# Patient Record
Sex: FEMALE | Race: WHITE | NOT HISPANIC OR LATINO | Employment: OTHER | URBAN - METROPOLITAN AREA
[De-identification: names, ages, dates, MRNs, and addresses within clinical notes are randomized per-mention and may not be internally consistent; named-entity substitution may affect disease eponyms.]

---

## 2023-02-28 PROBLEM — M25.511 RIGHT SHOULDER PAIN: Status: ACTIVE | Noted: 2023-02-28
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
## 2023-02-28 PROBLEM — M06.9 RHEUMATOID ARTHRITIS (MULTI): Status: ACTIVE | Noted: 2023-02-28

## 2023-02-28 PROBLEM — L72.3 INFECTED SEBACEOUS CYST: Status: ACTIVE | Noted: 2023-02-28

## 2023-02-28 PROBLEM — M47.12 CERVICAL SPONDYLOSIS WITH MYELOPATHY: Status: ACTIVE | Noted: 2023-02-28

## 2023-02-28 PROBLEM — F32.A ANXIETY AND DEPRESSION: Status: ACTIVE | Noted: 2023-02-28

## 2023-02-28 PROBLEM — M25.473 ANKLE SWELLING: Status: ACTIVE | Noted: 2023-02-28

## 2023-02-28 PROBLEM — M79.601 PAIN OF RIGHT UPPER EXTREMITY: Status: ACTIVE | Noted: 2023-02-28

## 2023-02-28 PROBLEM — D72.829 ELEVATED WHITE BLOOD CELL COUNT: Status: ACTIVE | Noted: 2023-02-28

## 2023-02-28 PROBLEM — E11.9 TYPE 2 DIABETES MELLITUS (MULTI): Status: ACTIVE | Noted: 2023-02-28

## 2023-02-28 PROBLEM — M25.549 PAIN IN JOINT, HAND: Status: ACTIVE | Noted: 2023-02-28

## 2023-02-28 PROBLEM — T14.8XXA SPLINTER IN SKIN: Status: ACTIVE | Noted: 2023-02-28

## 2023-02-28 PROBLEM — M54.12 CERVICAL RADICULOPATHY: Status: ACTIVE | Noted: 2023-02-28

## 2023-02-28 PROBLEM — L30.1 DYSHIDROTIC ECZEMA: Status: ACTIVE | Noted: 2023-02-28

## 2023-02-28 PROBLEM — I10 BENIGN ESSENTIAL HYPERTENSION: Status: ACTIVE | Noted: 2023-02-28

## 2023-02-28 PROBLEM — G24.3 CERVICAL DYSTONIA: Status: ACTIVE | Noted: 2023-02-28

## 2023-02-28 PROBLEM — M79.671 RIGHT FOOT PAIN: Status: ACTIVE | Noted: 2023-02-28

## 2023-02-28 PROBLEM — H66.93 BILATERAL OTITIS MEDIA: Status: ACTIVE | Noted: 2023-02-28

## 2023-02-28 PROBLEM — R10.811 RUQ ABDOMINAL TENDERNESS: Status: ACTIVE | Noted: 2023-02-28

## 2023-02-28 PROBLEM — F41.9 ANXIETY AND DEPRESSION: Status: ACTIVE | Noted: 2023-02-28

## 2023-02-28 PROBLEM — M25.539 PAIN, WRIST JOINT: Status: ACTIVE | Noted: 2023-02-28

## 2023-02-28 PROBLEM — D69.2 PURPURA (CMS-HCC): Status: ACTIVE | Noted: 2023-02-28

## 2023-02-28 PROBLEM — L60.9 NAIL LESION: Status: ACTIVE | Noted: 2023-02-28

## 2023-02-28 PROBLEM — M25.561 RIGHT KNEE PAIN: Status: ACTIVE | Noted: 2023-02-28

## 2023-02-28 PROBLEM — H66.92 OTITIS MEDIA, LEFT: Status: ACTIVE | Noted: 2023-02-28

## 2023-02-28 PROBLEM — F32.A DEPRESSION: Status: ACTIVE | Noted: 2023-02-28

## 2023-02-28 PROBLEM — M48.02 SPINAL STENOSIS OF CERVICAL REGION: Status: ACTIVE | Noted: 2023-02-28

## 2023-02-28 PROBLEM — H60.90 OTITIS EXTERNA: Status: ACTIVE | Noted: 2023-02-28

## 2023-02-28 PROBLEM — J45.909 ASTHMA (HHS-HCC): Status: ACTIVE | Noted: 2023-02-28

## 2023-02-28 PROBLEM — J32.9 SINUSITIS: Status: ACTIVE | Noted: 2023-02-28

## 2023-02-28 PROBLEM — M54.2 NECK PAIN: Status: ACTIVE | Noted: 2023-02-28

## 2023-02-28 PROBLEM — R13.10 DYSPHAGIA: Status: ACTIVE | Noted: 2023-02-28

## 2023-02-28 PROBLEM — K52.9 CHRONIC DIARRHEA: Status: ACTIVE | Noted: 2023-02-28

## 2023-02-28 PROBLEM — K04.7 DENTAL INFECTION: Status: ACTIVE | Noted: 2023-02-28

## 2023-02-28 PROBLEM — F43.0 STRESS REACTION: Status: ACTIVE | Noted: 2023-02-28

## 2023-02-28 PROBLEM — E78.5 DYSLIPIDEMIA: Status: ACTIVE | Noted: 2023-02-28

## 2023-02-28 PROBLEM — H61.23 BILATERAL IMPACTED CERUMEN: Status: ACTIVE | Noted: 2023-02-28

## 2023-02-28 PROBLEM — G25.0 BENIGN HEAD TREMOR: Status: ACTIVE | Noted: 2023-02-28

## 2023-02-28 PROBLEM — M54.50 LOW BACK PAIN: Status: ACTIVE | Noted: 2023-02-28

## 2023-02-28 PROBLEM — G47.33 OBSTRUCTIVE SLEEP APNEA SYNDROME: Status: ACTIVE | Noted: 2023-02-28

## 2023-02-28 PROBLEM — R25.1 TREMOR: Status: ACTIVE | Noted: 2023-02-28

## 2023-02-28 PROBLEM — T07.XXXA MULTIPLE CONTUSIONS: Status: ACTIVE | Noted: 2023-02-28

## 2023-02-28 PROBLEM — L08.9 INFECTED SEBACEOUS CYST: Status: ACTIVE | Noted: 2023-02-28

## 2023-02-28 RX ORDER — ALENDRONATE SODIUM 70 MG/1
TABLET ORAL
COMMUNITY
Start: 2022-06-22 | End: 2023-05-04 | Stop reason: ALTCHOICE

## 2023-02-28 RX ORDER — INSULIN DEGLUDEC 100 U/ML
INJECTION, SOLUTION SUBCUTANEOUS
COMMUNITY
Start: 2019-04-29

## 2023-02-28 RX ORDER — METHOTREXATE 2.5 MG/1
TABLET ORAL
COMMUNITY
Start: 2020-05-22

## 2023-02-28 RX ORDER — PRIMIDONE 50 MG/1
2 TABLET ORAL 2 TIMES DAILY
COMMUNITY
Start: 2016-11-14

## 2023-02-28 RX ORDER — ESCITALOPRAM OXALATE 20 MG/1
1 TABLET ORAL DAILY
COMMUNITY
Start: 2017-01-04 | End: 2023-11-03 | Stop reason: ALTCHOICE

## 2023-02-28 RX ORDER — ALBUTEROL SULFATE 90 UG/1
AEROSOL, METERED RESPIRATORY (INHALATION)
COMMUNITY

## 2023-02-28 RX ORDER — TRAZODONE HYDROCHLORIDE 50 MG/1
TABLET ORAL
COMMUNITY
Start: 2012-01-06 | End: 2023-10-17 | Stop reason: ALTCHOICE

## 2023-02-28 RX ORDER — BLOOD SUGAR DIAGNOSTIC
STRIP MISCELLANEOUS
COMMUNITY
Start: 2019-05-03

## 2023-02-28 RX ORDER — LEVOTHYROXINE SODIUM 88 UG/1
1 TABLET ORAL DAILY
COMMUNITY
Start: 2019-03-13 | End: 2023-11-03 | Stop reason: ALTCHOICE

## 2023-02-28 RX ORDER — PREDNISONE 2.5 MG/1
TABLET ORAL
COMMUNITY
Start: 2020-07-30 | End: 2023-11-03 | Stop reason: ALTCHOICE

## 2023-02-28 RX ORDER — SIMVASTATIN 40 MG/1
1 TABLET, FILM COATED ORAL DAILY
COMMUNITY
Start: 2012-01-24 | End: 2023-08-07 | Stop reason: SDUPTHER

## 2023-02-28 RX ORDER — BUSPIRONE HYDROCHLORIDE 10 MG/1
TABLET ORAL
COMMUNITY
Start: 2020-11-13 | End: 2023-12-07 | Stop reason: SDUPTHER

## 2023-02-28 RX ORDER — SALICYLIC ACID 275 MG/ML
LIQUID TOPICAL
COMMUNITY
Start: 2020-09-24 | End: 2023-11-03 | Stop reason: ALTCHOICE

## 2023-02-28 RX ORDER — FOLIC ACID 1 MG/1
1 TABLET ORAL DAILY
COMMUNITY
Start: 2020-05-21

## 2023-02-28 RX ORDER — CLONAZEPAM 0.5 MG/1
1 TABLET ORAL 2 TIMES DAILY
COMMUNITY
Start: 2012-01-06 | End: 2023-11-03 | Stop reason: ALTCHOICE

## 2023-02-28 RX ORDER — TRIHEXYPHENIDYL HYDROCHLORIDE 2 MG/1
1 TABLET ORAL 3 TIMES DAILY
COMMUNITY
Start: 2019-11-06 | End: 2023-12-26

## 2023-02-28 RX ORDER — SODIUM, POTASSIUM,MAG SULFATES 17.5-3.13G
SOLUTION, RECONSTITUTED, ORAL ORAL
COMMUNITY
Start: 2021-11-16 | End: 2023-10-17 | Stop reason: ALTCHOICE

## 2023-03-14 PROBLEM — H61.23 BILATERAL IMPACTED CERUMEN: Status: RESOLVED | Noted: 2023-02-28 | Resolved: 2023-03-14

## 2023-03-14 PROBLEM — T07.XXXA MULTIPLE CONTUSIONS: Status: RESOLVED | Noted: 2023-02-28 | Resolved: 2023-03-14

## 2023-03-14 PROBLEM — M79.601 PAIN OF RIGHT UPPER EXTREMITY: Status: RESOLVED | Noted: 2023-02-28 | Resolved: 2023-03-14

## 2023-03-14 PROBLEM — H66.93 BILATERAL OTITIS MEDIA: Status: RESOLVED | Noted: 2023-02-28 | Resolved: 2023-03-14

## 2023-03-14 PROBLEM — M25.539 PAIN, WRIST JOINT: Status: RESOLVED | Noted: 2023-02-28 | Resolved: 2023-03-14

## 2023-03-14 PROBLEM — L08.9 INFECTED SEBACEOUS CYST: Status: RESOLVED | Noted: 2023-02-28 | Resolved: 2023-03-14

## 2023-03-14 PROBLEM — M54.50 LOW BACK PAIN: Status: RESOLVED | Noted: 2023-02-28 | Resolved: 2023-03-14

## 2023-03-14 PROBLEM — M54.12 CERVICAL RADICULOPATHY: Status: RESOLVED | Noted: 2023-02-28 | Resolved: 2023-03-14

## 2023-03-14 PROBLEM — M25.473 ANKLE SWELLING: Status: RESOLVED | Noted: 2023-02-28 | Resolved: 2023-03-14

## 2023-03-14 PROBLEM — T14.8XXA SPLINTER IN SKIN: Status: RESOLVED | Noted: 2023-02-28 | Resolved: 2023-03-14

## 2023-03-14 PROBLEM — R10.811 RUQ ABDOMINAL TENDERNESS: Status: RESOLVED | Noted: 2023-02-28 | Resolved: 2023-03-14

## 2023-03-14 PROBLEM — M54.2 NECK PAIN: Status: RESOLVED | Noted: 2023-02-28 | Resolved: 2023-03-14

## 2023-03-14 PROBLEM — M79.671 RIGHT FOOT PAIN: Status: RESOLVED | Noted: 2023-02-28 | Resolved: 2023-03-14

## 2023-03-14 PROBLEM — M25.511 RIGHT SHOULDER PAIN: Status: RESOLVED | Noted: 2023-02-28 | Resolved: 2023-03-14

## 2023-03-14 PROBLEM — H66.92 OTITIS MEDIA, LEFT: Status: RESOLVED | Noted: 2023-02-28 | Resolved: 2023-03-14

## 2023-03-14 PROBLEM — D69.2 PURPURA (CMS-HCC): Status: RESOLVED | Noted: 2023-02-28 | Resolved: 2023-03-14

## 2023-03-14 PROBLEM — J32.9 SINUSITIS: Status: RESOLVED | Noted: 2023-02-28 | Resolved: 2023-03-14

## 2023-03-14 PROBLEM — M25.561 RIGHT KNEE PAIN: Status: RESOLVED | Noted: 2023-02-28 | Resolved: 2023-03-14

## 2023-03-14 PROBLEM — L72.3 INFECTED SEBACEOUS CYST: Status: RESOLVED | Noted: 2023-02-28 | Resolved: 2023-03-14

## 2023-03-14 PROBLEM — K04.7 DENTAL INFECTION: Status: RESOLVED | Noted: 2023-02-28 | Resolved: 2023-03-14

## 2023-03-14 PROBLEM — H60.90 OTITIS EXTERNA: Status: RESOLVED | Noted: 2023-02-28 | Resolved: 2023-03-14

## 2023-03-14 PROBLEM — M25.549 PAIN IN JOINT, HAND: Status: RESOLVED | Noted: 2023-02-28 | Resolved: 2023-03-14

## 2023-03-15 ENCOUNTER — APPOINTMENT (OUTPATIENT)
Dept: PRIMARY CARE | Facility: CLINIC | Age: 68
End: 2023-03-15
Payer: MEDICARE

## 2023-03-22 ASSESSMENT — ENCOUNTER SYMPTOMS
HEADACHES: 0
RHINORRHEA: 0
CHEST TIGHTNESS: 0
EYE DISCHARGE: 0
SHORTNESS OF BREATH: 0
MYALGIAS: 0
FATIGUE: 0
VOMITING: 0
CHILLS: 0
DIARRHEA: 0
ARTHRALGIAS: 0
PALPITATIONS: 0
SINUS PRESSURE: 0
SORE THROAT: 0
FEVER: 0
EYE REDNESS: 0
ABDOMINAL PAIN: 0
WHEEZING: 0
COUGH: 0
FACIAL SWELLING: 0

## 2023-03-22 NOTE — PROGRESS NOTES
Subjective   Patient ID: Sweta Lim is a 67 y.o. female who presents for No chief complaint on file..    current sx-  when did sx start-  did pt take a covid-19 test?  if yes when?  Last albuterol inh use-    lala-rheum, giovanni-ob gyn, podiatristindu  Name of rheum  Name of podiatrist  Sees  psych, neurosurg, neuro-mvmt, gi  Sees ent-jose  Last eye dr appt  If in last 12mon, lala  Name of eye dr  Last dentist    last physical >1yr  last labs 6/2022 tsh and t4 nl; 3/2022 vit d nl  Last a1c 2/28/23 A1C 6.4  Last microalb 2/28/23 microalb high at 60; did dr griggs order a 24hr urine test to further evaluate this?  is pt fasting?  Due for cmp, lipid, cbc  Sugars fasting  Sugars 2hrs pp  Bps at home  Using cpap nightly?  Tdap 2020  flu shot- unavailable  shingrix  [id number] 2020  last colonoscopy  last pap  last mammo 2/23/22 dr davila; has she had a mammo in the last mon?  bone density  advanced care plan  Family history form        HPI  Due for awv      No care team member to display     Review of Systems   Constitutional:  Negative for chills, fatigue and fever.   HENT:  Negative for congestion, ear discharge, ear pain, facial swelling, postnasal drip, rhinorrhea, sinus pressure and sore throat.    Eyes:  Negative for discharge and redness.   Respiratory:  Negative for cough, chest tightness, shortness of breath and wheezing.    Cardiovascular:  Negative for chest pain, palpitations and leg swelling.   Gastrointestinal:  Negative for abdominal pain, diarrhea and vomiting.   Musculoskeletal:  Negative for arthralgias and myalgias.   Skin:  Negative for rash.   Neurological:  Negative for headaches.       Objective   There were no vitals taken for this visit.   BP Readings from Last 3 Encounters:   08/24/22 118/62   06/02/22 112/64   05/03/22 132/74     Wt Readings from Last 3 Encounters:   08/24/22 89.4 kg (197 lb)   06/02/22 88.1 kg (194 lb 3 oz)   05/03/22 90.8 kg (200 lb 3.2 oz)       Physical  Exam  Constitutional:       Appearance: Normal appearance.   HENT:      Head: Normocephalic.      Right Ear: Tympanic membrane, ear canal and external ear normal.      Left Ear: Tympanic membrane, ear canal and external ear normal.      Nose: Nose normal.      Mouth/Throat:      Mouth: Mucous membranes are moist.      Pharynx: No oropharyngeal exudate or posterior oropharyngeal erythema.   Cardiovascular:      Rate and Rhythm: Normal rate and regular rhythm.      Heart sounds: Normal heart sounds.   Pulmonary:      Effort: Pulmonary effort is normal.      Breath sounds: Normal breath sounds. No wheezing, rhonchi or rales.   Lymphadenopathy:      Cervical: No cervical adenopathy.   Neurological:      Mental Status: She is alert.         Assessment/Plan   {Assess/PlanSmartLinks:87797}

## 2023-03-23 ENCOUNTER — APPOINTMENT (OUTPATIENT)
Dept: PRIMARY CARE | Facility: CLINIC | Age: 68
End: 2023-03-23
Payer: MEDICARE

## 2023-03-29 ASSESSMENT — ENCOUNTER SYMPTOMS
SORE THROAT: 0
FEVER: 0
EYE REDNESS: 0
HEADACHES: 0
CHEST TIGHTNESS: 0
FATIGUE: 0
MYALGIAS: 0
ARTHRALGIAS: 0
SHORTNESS OF BREATH: 0
DIARRHEA: 0
COUGH: 0
WHEEZING: 0
CHILLS: 0
FACIAL SWELLING: 0
PALPITATIONS: 0
ABDOMINAL PAIN: 0
EYE DISCHARGE: 0
VOMITING: 0
SINUS PRESSURE: 0

## 2023-03-29 NOTE — PROGRESS NOTES
Subjective   Patient ID: Sweta Lim is a 67 y.o. female who presents for Earache (Have pain in left ear, she's falling, wasn't eating when she lost her mom in a lot of pain. Swelling of her neck. Pain for about 6wks./No recent COVID test/Last albuterol use about 6:30pm last night./Dr. Toby Haynes/Dr. Malone/Last eye dr dickey last yr./Last dentist last yr./Fasting-No/Bp not being check at home/Cpap not being used/Last tdap 2 yrs ago/Decline flu shot, shingrix, [id number]/Last colon unsure/Last pap 3yrs ago/Last mammo 3yrs/Bone density-none/Advance care plan-no//).    lala-rheum, giovanni-ob gyn, podiatristindu  Name of rheum-toby  Name of podiatrist-benito  Sees  psych, neurosurg, neuro-mvmt, gi  Sees ent-jose  Last eye dr dickey-last yr  Name of eye dr [facility]  Last dentist last yr    last physical >1yr  last labs 2022 tsh and t4 nl; 3/2022 vit d nl  Last a1c 23 A1C 6.4  Last microalb 23 microalb high at 60; did dr griggs order a 24hr urine test to further evaluate this? no  is pt fasting? no  Due for cmp, lipid, cbc  Sugars fasting-low sugars  Sugars 2hrs pp-none  Bps at home no  Using cpap nightly? no  Tdap 2yrs ago-[facility]  flu shot-unavailable  Shingrix-declines  [id number]   last colonoscopy-unsure  last pap 3yrs ago  last mammo 22 dr davila; has she had a mammo in the last mon?no  bone density none  advanced care plan no  Family history form    Not ready for neck surgery  Disc fusion in past but getting worse again    HPI  current sx-lf ear pain x 6wks  Runny nose and stuffy nose noted  No post nasal drip, ST, ear drainage, fever, chills  Selftxt mucinex    Decr appetite when mom  24; has decr weight; legs weak so hard to walk and falling  Diet iced tea, 3 meal repl drinks  Aunt in nursing home for dementia  did pt take a covid-19 test? no  Last albuterol inh use-last night  Has ent     No care team member to display     Review of Systems   Constitutional:   Negative for chills, fatigue and fever.   HENT:  Positive for congestion, ear pain and rhinorrhea. Negative for ear discharge, facial swelling, postnasal drip, sinus pressure and sore throat.    Eyes:  Negative for discharge and redness.   Respiratory:  Negative for cough, chest tightness, shortness of breath and wheezing.    Cardiovascular:  Negative for chest pain, palpitations and leg swelling.   Gastrointestinal:  Negative for abdominal pain, diarrhea and vomiting.   Genitourinary:         Decr appetite   Musculoskeletal:  Negative for arthralgias and myalgias.   Skin:  Negative for rash.   Neurological:  Positive for weakness. Negative for headaches.       Objective   Visit Vitals  /59   Pulse 69   Temp 36.7 °C (98 °F) (Temporal)      BP Readings from Last 3 Encounters:   03/30/23 115/59   08/24/22 118/62   06/02/22 112/64     Wt Readings from Last 3 Encounters:   03/30/23 78 kg (172 lb)   08/24/22 89.4 kg (197 lb)   06/02/22 88.1 kg (194 lb 3 oz)       Physical Exam  Constitutional:       Appearance: Normal appearance.   HENT:      Head: Normocephalic.      Right Ear: Tympanic membrane, ear canal and external ear normal.      Left Ear: Tympanic membrane, ear canal and external ear normal.      Nose: Nose normal.      Mouth/Throat:      Mouth: Mucous membranes are moist.      Pharynx: No oropharyngeal exudate or posterior oropharyngeal erythema.   Cardiovascular:      Rate and Rhythm: Normal rate.      Heart sounds: Murmur heard.      Comments: Pause heard between 2 beats  Pulmonary:      Effort: Pulmonary effort is normal.      Breath sounds: Normal breath sounds. No wheezing, rhonchi or rales.   Lymphadenopathy:      Cervical: No cervical adenopathy.   Neurological:      Mental Status: She is alert.         Assessment/Plan   Diagnoses and all orders for this visit:  Heart murmur  -     Referral to Cardiology; Future  Encounter for screening mammogram for malignant neoplasm of breast  -     BI mammo  bilateral screening tomosynthesis; Future  Postmenopausal estrogen deficiency  -     XR DEXA bone density; Future  Cerumen debris on tympanic membrane, left  -     Ear cerumen removal; Future  Dizziness  -     CBC and Auto Differential; Future  -     Comprehensive Metabolic Panel; Future  Weight loss  -     Referral to Nutrition Services; Future  Microalbuminuria  -     Albumin, 24 Hour Urine; Future  Lipid screening  -     Lipid Panel; Future  Left ear pain  -     amoxicillin-pot clavulanate (Augmentin) 875-125 mg tablet; Take 1 tablet (875 mg) by mouth in the morning and 1 tablet (875 mg) before bedtime. Do all this for 10 days.  Other orders  -     Follow Up In Primary Care; Future

## 2023-03-30 ENCOUNTER — OFFICE VISIT (OUTPATIENT)
Dept: PRIMARY CARE | Facility: CLINIC | Age: 68
End: 2023-03-30
Payer: MEDICARE

## 2023-03-30 VITALS
TEMPERATURE: 98 F | WEIGHT: 172 LBS | BODY MASS INDEX: 26.94 KG/M2 | DIASTOLIC BLOOD PRESSURE: 59 MMHG | SYSTOLIC BLOOD PRESSURE: 115 MMHG | HEART RATE: 69 BPM | OXYGEN SATURATION: 87 %

## 2023-03-30 DIAGNOSIS — R01.1 HEART MURMUR: Primary | ICD-10-CM

## 2023-03-30 DIAGNOSIS — R80.9 MICROALBUMINURIA: ICD-10-CM

## 2023-03-30 DIAGNOSIS — F11.20 NARCOTIC DEPENDENCE (MULTI): ICD-10-CM

## 2023-03-30 DIAGNOSIS — Z13.220 LIPID SCREENING: ICD-10-CM

## 2023-03-30 DIAGNOSIS — M06.9 RHEUMATOID ARTHRITIS, INVOLVING UNSPECIFIED SITE, UNSPECIFIED WHETHER RHEUMATOID FACTOR PRESENT (MULTI): ICD-10-CM

## 2023-03-30 DIAGNOSIS — H61.22 CERUMEN DEBRIS ON TYMPANIC MEMBRANE, LEFT: ICD-10-CM

## 2023-03-30 DIAGNOSIS — E11.42 DIABETIC POLYNEUROPATHY ASSOCIATED WITH TYPE 2 DIABETES MELLITUS (MULTI): ICD-10-CM

## 2023-03-30 DIAGNOSIS — H92.02 LEFT EAR PAIN: ICD-10-CM

## 2023-03-30 DIAGNOSIS — R42 DIZZINESS: ICD-10-CM

## 2023-03-30 DIAGNOSIS — Z78.0 POSTMENOPAUSAL ESTROGEN DEFICIENCY: ICD-10-CM

## 2023-03-30 DIAGNOSIS — Z12.31 ENCOUNTER FOR SCREENING MAMMOGRAM FOR MALIGNANT NEOPLASM OF BREAST: ICD-10-CM

## 2023-03-30 DIAGNOSIS — R63.4 WEIGHT LOSS: ICD-10-CM

## 2023-03-30 PROCEDURE — 99214 OFFICE O/P EST MOD 30 MIN: CPT | Performed by: NURSE PRACTITIONER

## 2023-03-30 PROCEDURE — 1160F RVW MEDS BY RX/DR IN RCRD: CPT | Performed by: NURSE PRACTITIONER

## 2023-03-30 PROCEDURE — 1159F MED LIST DOCD IN RCRD: CPT | Performed by: NURSE PRACTITIONER

## 2023-03-30 PROCEDURE — 3078F DIAST BP <80 MM HG: CPT | Performed by: NURSE PRACTITIONER

## 2023-03-30 PROCEDURE — 3074F SYST BP LT 130 MM HG: CPT | Performed by: NURSE PRACTITIONER

## 2023-03-30 RX ORDER — AMOXICILLIN AND CLAVULANATE POTASSIUM 875; 125 MG/1; MG/1
1 TABLET, FILM COATED ORAL 2 TIMES DAILY
Qty: 20 TABLET | Refills: 0 | Status: SHIPPED | OUTPATIENT
Start: 2023-03-30 | End: 2023-04-09

## 2023-03-30 ASSESSMENT — ENCOUNTER SYMPTOMS
WEAKNESS: 1
RHINORRHEA: 1

## 2023-03-30 NOTE — PATIENT INSTRUCTIONS
See counselor once a wk or twice a month    For dizziness-labs, consider ct head    24hr urine to check for protein    You can use the lab in our building when fasting. The hrs are: Monday-Thursday, 7 a.m. - 6 p.m., Friday, 7 a.m. - 5 p.m., Saturday 8 a.m. - 12 noon.   No appt needed, BUT YOU DO NEED THE PAPER ORDER.    Fasting is no food, drink, gum or mints other than water for 8-12 hrs.   Results will be back in 2-3 business days for most labs. It is always recommended for any orders (labs, xrays, ultrasounds,MRI, ct scan, procedures etc) to check with your insurance provider for expected costs or expenses to you.       Ear irrigation left  Zyrtec 1 a day x 2wks  Antibiotic  See dr garcia ([phone]) if not getting better in 1wk    Set up bone density and mammogram at [phone]    Set up cardio  appt and nutrition appt at [phone]    Hold on pulm  referral    Need pneumonia shot date at [facility]    Stop smoking    Discuss cpap at next appt    Return for in 1mon for follow up      I will communicate with you via [employer] regarding messages and results. If you need help with this, you can call the support line at [phone].    IT WAS A PLEASURE TO SEE YOU TODAY. THANK YOU FOR CHOOSING US FOR YOUR HEALTHCARE NEEDS.

## 2023-04-06 ENCOUNTER — TELEPHONE (OUTPATIENT)
Dept: PRIMARY CARE | Facility: CLINIC | Age: 68
End: 2023-04-06

## 2023-05-01 ENCOUNTER — APPOINTMENT (OUTPATIENT)
Dept: PRIMARY CARE | Facility: CLINIC | Age: 68
End: 2023-05-01
Payer: MEDICARE

## 2023-05-01 ASSESSMENT — ENCOUNTER SYMPTOMS
CONSTITUTIONAL NEGATIVE: 1
SHORTNESS OF BREATH: 0
WHEEZING: 0

## 2023-05-01 NOTE — PROGRESS NOTES
Subjective   Patient ID: Sweta Lim is a 67 y.o. female who presents for No chief complaint on file..    last physical >1yr  last labs 6/2022 tsh and t4 nl; 3/2022 vit d nl  Last a1c 2/28/23 A1C 6.4  Last microalb 2/28/23 microalb high at 60; did dr griggs order a 24hr urine test to further evaluate this? No    Saw cardio NP 4/6/23 re: heart murmur    Due for lipid, cmp, cbc  Is pt fasting?    Did pt set up bone density    Did pt set up mammo    Did pt set up nutrition appt    Did pt do 24hr urine test      HPI            No care team member to display     Review of Systems   Constitutional: Negative.    Respiratory:  Negative for shortness of breath and wheezing.    Cardiovascular:  Negative for chest pain.       Objective   There were no vitals taken for this visit.     BP Readings from Last 3 Encounters:   03/30/23 115/59   08/24/22 118/62   06/02/22 112/64     Wt Readings from Last 3 Encounters:   03/30/23 78 kg (172 lb)   08/24/22 89.4 kg (197 lb)   06/02/22 88.1 kg (194 lb 3 oz)       Physical Exam  Constitutional:       General: She is not in acute distress.     Appearance: Normal appearance.   Neurological:      Mental Status: She is alert.       Assessment/Plan   There are no diagnoses linked to this encounter.

## 2023-05-03 ASSESSMENT — ENCOUNTER SYMPTOMS
SHORTNESS OF BREATH: 0
WHEEZING: 0
CONSTITUTIONAL NEGATIVE: 1

## 2023-05-03 NOTE — PROGRESS NOTES
Subjective   Patient ID: Sweta Lim is a 67 y.o. female who presents for Follow-up (Follow-up Pt states she want talk about her teeth when they numbed her she thinks something maybe going on with the arteries in neck. She's feeling tired all the time, She wasn't told about the instructions for the 24 urine test so she tossed it out./Mammo-No/Nutrition-No she think the cardio dr set it up. /She states her ear still hurt left from the irrigation /).    last physical >1yr  last labs 6/2022 tsh and t4 nl; 3/2022 vit d nl  Last a1c 2/28/23 A1C 6.4  Last microalb 2/28/23 microalb high at 60; did dr griggs order a 24hr urine test to further evaluate this? No    Saw cardio NP 4/6/23 re: heart murmur    Due for cbc  Kasasndra vicente ordered cmp lipid and a1c  Is pt fasting?    Did pt set up bone density    Did pt set up mammo- no    Did pt set up nutrition appt-no    Did pt do 24hr urine test yes but not refrig      HPI  Teeth-injected teeth and sob/asthma attack happened; pt has asthma  Donte ceballos, [phone]    Needs new 24hr urine order    Pain in lf ear still    Needs mammo and bone density order-might have appts next wk      I have personally reviewed the OARRS report for this patient. I have considered the risks of abuse, dependence, addiction and diversion. No concerns.      No care team member to display     Review of Systems   Constitutional: Negative.    Respiratory:  Negative for shortness of breath and wheezing.    Cardiovascular:  Negative for chest pain.       Objective   Visit Vitals  /68   Pulse 64   Temp 36.5 °C (97.7 °F) (Oral)      BP Readings from Last 3 Encounters:   05/04/23 125/68   03/30/23 115/59   08/24/22 118/62     Wt Readings from Last 3 Encounters:   05/04/23 78 kg (172 lb)   03/30/23 78 kg (172 lb)   08/24/22 89.4 kg (197 lb)       Physical Exam  Constitutional:       General: She is not in acute distress.     Appearance: Normal appearance.   Neurological:      Mental Status: She  is alert.         Assessment/Plan   1. Anxiety and depression    - ALPRAZolam (Xanax) 0.5 mg tablet; 1 tab before dental procedure  Dispense: 5 tablet; Refill: 0  - Referral to Psychology; Future    2. Left ear pain    - Referral to ENT; Future

## 2023-05-04 ENCOUNTER — OFFICE VISIT (OUTPATIENT)
Dept: PRIMARY CARE | Facility: CLINIC | Age: 68
End: 2023-05-04
Payer: MEDICARE

## 2023-05-04 VITALS
HEART RATE: 64 BPM | SYSTOLIC BLOOD PRESSURE: 125 MMHG | WEIGHT: 172 LBS | DIASTOLIC BLOOD PRESSURE: 68 MMHG | OXYGEN SATURATION: 92 % | HEIGHT: 67 IN | TEMPERATURE: 97.7 F | BODY MASS INDEX: 27 KG/M2

## 2023-05-04 DIAGNOSIS — F41.9 ANXIETY AND DEPRESSION: Primary | ICD-10-CM

## 2023-05-04 DIAGNOSIS — H92.02 LEFT EAR PAIN: ICD-10-CM

## 2023-05-04 DIAGNOSIS — F32.A ANXIETY AND DEPRESSION: Primary | ICD-10-CM

## 2023-05-04 LAB
NON-UH HIE BASO COUNT: 0.04 X1000 (ref 0–0.2)
NON-UH HIE BASOS %: 0.8 %
NON-UH HIE DIFF?: NO
NON-UH HIE EOS COUNT: 0.01 X1000 (ref 0–0.5)
NON-UH HIE EOSIN %: 0.2 %
NON-UH HIE HCT: 46.4 % (ref 36–46)
NON-UH HIE HGB: 15.3 G/DL (ref 12–16)
NON-UH HIE INSTR WBC: 5.8
NON-UH HIE LYMPH %: 19.1 %
NON-UH HIE LYMPH COUNT: 1.11 X1000 (ref 1.2–4.8)
NON-UH HIE MCH: 33.9 PG (ref 27–34)
NON-UH HIE MCHC: 33 G/DL (ref 32–37)
NON-UH HIE MCV: 102.7 FL (ref 80–100)
NON-UH HIE MONO %: 7.1 %
NON-UH HIE MONO COUNT: 0.42 X1000 (ref 0.1–1)
NON-UH HIE MPV: 8.2 FL (ref 7.4–10.4)
NON-UH HIE NEUTROPHIL %: 72.9 %
NON-UH HIE NEUTROPHIL COUNT (ANC): 4.25 X1000 (ref 1.4–8.8)
NON-UH HIE NUCLEATED RBC: 0 /100WBC
NON-UH HIE PLATELET: 299 X10 (ref 150–450)
NON-UH HIE RBC: 4.52 X10 (ref 4.2–5.4)
NON-UH HIE RDW: 15 % (ref 11.5–14.5)
NON-UH HIE WBC: 5.8 X10 (ref 4.5–11)

## 2023-05-04 PROCEDURE — 3074F SYST BP LT 130 MM HG: CPT | Performed by: NURSE PRACTITIONER

## 2023-05-04 PROCEDURE — 3078F DIAST BP <80 MM HG: CPT | Performed by: NURSE PRACTITIONER

## 2023-05-04 PROCEDURE — 1160F RVW MEDS BY RX/DR IN RCRD: CPT | Performed by: NURSE PRACTITIONER

## 2023-05-04 PROCEDURE — 99214 OFFICE O/P EST MOD 30 MIN: CPT | Performed by: NURSE PRACTITIONER

## 2023-05-04 PROCEDURE — 1159F MED LIST DOCD IN RCRD: CPT | Performed by: NURSE PRACTITIONER

## 2023-05-04 RX ORDER — ALPRAZOLAM 0.5 MG/1
TABLET ORAL
Qty: 5 TABLET | Refills: 0 | Status: SHIPPED | OUTPATIENT
Start: 2023-05-04 | End: 2023-10-17 | Stop reason: ALTCHOICE

## 2023-05-04 ASSESSMENT — PATIENT HEALTH QUESTIONNAIRE - PHQ9
1. LITTLE INTEREST OR PLEASURE IN DOING THINGS: NEARLY EVERY DAY
2. FEELING DOWN, DEPRESSED OR HOPELESS: NEARLY EVERY DAY
SUM OF ALL RESPONSES TO PHQ9 QUESTIONS 1 AND 2: 6

## 2023-05-04 NOTE — PATIENT INSTRUCTIONS
For dentist: use albuterol inhaler and xanax 1hr before dentist    Cbc lab today    Do 24hr urine test; 2nd urination of the day to first urination of next day goes into the jug. Keep refrigerated until bring to lab    Refer to ent for lf ear    Set up mammogram and bone density appt  Set up nutrition appt  Set up psychologist/therapist    Return for wellness appt      I will communicate with you via [employer] regarding messages and results. If you need help with this, you can call the support line at [phone].    IT WAS A PLEASURE TO SEE YOU TODAY. THANK YOU FOR CHOOSING US FOR YOUR HEALTHCARE NEEDS.

## 2023-05-09 ENCOUNTER — TELEPHONE (OUTPATIENT)
Dept: PRIMARY CARE | Facility: CLINIC | Age: 68
End: 2023-05-09
Payer: MEDICARE

## 2023-05-09 NOTE — TELEPHONE ENCOUNTER
----- Message from RENETTA Chan-CNP sent at 5/5/2023  3:27 PM EDT -----  CBC (complete blood count) was normal which looks at infection and anemia markers.

## 2023-05-24 ENCOUNTER — TELEPHONE (OUTPATIENT)
Dept: PRIMARY CARE | Facility: CLINIC | Age: 68
End: 2023-05-24
Payer: MEDICARE

## 2023-05-24 NOTE — TELEPHONE ENCOUNTER
Pt called and said she was in a few weeks ago and was told she needs a deep cleaning from the dentist but never received an order for it.

## 2023-05-24 NOTE — TELEPHONE ENCOUNTER
Pt had asthma/panic attack at dentist. I gave her xanax to use before dental procedure and referred to psychology.  What is her question?

## 2023-05-25 NOTE — TELEPHONE ENCOUNTER
No I did not. I do not make dental recommendatikons.  Pt couldn't do her dentist appt due to asthma/panic.  I gave her xanax to use before her procedure and referred her to psychology.

## 2023-05-26 ENCOUNTER — TELEPHONE (OUTPATIENT)
Dept: PRIMARY CARE | Facility: CLINIC | Age: 68
End: 2023-05-26
Payer: MEDICARE

## 2023-05-26 NOTE — TELEPHONE ENCOUNTER
Patient was told by her dentist that she needs medical clearance for a deep cleaning. Dr. Donte Amezquita. [phone].

## 2023-05-26 NOTE — TELEPHONE ENCOUNTER
For Lissy to call.  Since I did not know this at our last appt, she will need an appt to discuss her sob and gi symptoms.  I will be unable to  clear her until  she gets those issues in check and also her cardio NP will need to clear her too.

## 2023-05-31 ASSESSMENT — ENCOUNTER SYMPTOMS
CONSTITUTIONAL NEGATIVE: 1
WHEEZING: 0
SHORTNESS OF BREATH: 0

## 2023-06-01 ENCOUNTER — OFFICE VISIT (OUTPATIENT)
Dept: PRIMARY CARE | Facility: CLINIC | Age: 68
End: 2023-06-01
Payer: MEDICARE

## 2023-06-01 VITALS
TEMPERATURE: 97.4 F | HEART RATE: 61 BPM | SYSTOLIC BLOOD PRESSURE: 101 MMHG | BODY MASS INDEX: 25.84 KG/M2 | DIASTOLIC BLOOD PRESSURE: 58 MMHG | WEIGHT: 165 LBS

## 2023-06-01 DIAGNOSIS — F33.1 MODERATE EPISODE OF RECURRENT MAJOR DEPRESSIVE DISORDER (MULTI): ICD-10-CM

## 2023-06-01 DIAGNOSIS — R80.9 MICROALBUMINURIA: ICD-10-CM

## 2023-06-01 DIAGNOSIS — R63.0 DECREASED APPETITE: ICD-10-CM

## 2023-06-01 DIAGNOSIS — M54.2 NECK PAIN: ICD-10-CM

## 2023-06-01 DIAGNOSIS — F32.A ANXIETY AND DEPRESSION: ICD-10-CM

## 2023-06-01 DIAGNOSIS — J45.20 MILD INTERMITTENT ASTHMA WITHOUT COMPLICATION (HHS-HCC): ICD-10-CM

## 2023-06-01 DIAGNOSIS — R06.02 SOB (SHORTNESS OF BREATH): Primary | ICD-10-CM

## 2023-06-01 DIAGNOSIS — F41.9 ANXIETY AND DEPRESSION: ICD-10-CM

## 2023-06-01 DIAGNOSIS — I77.9 CAROTID ARTERY DISEASE, UNSPECIFIED LATERALITY, UNSPECIFIED TYPE (CMS-HCC): ICD-10-CM

## 2023-06-01 PROCEDURE — 3074F SYST BP LT 130 MM HG: CPT | Performed by: NURSE PRACTITIONER

## 2023-06-01 PROCEDURE — 3078F DIAST BP <80 MM HG: CPT | Performed by: NURSE PRACTITIONER

## 2023-06-01 PROCEDURE — 1160F RVW MEDS BY RX/DR IN RCRD: CPT | Performed by: NURSE PRACTITIONER

## 2023-06-01 PROCEDURE — 1159F MED LIST DOCD IN RCRD: CPT | Performed by: NURSE PRACTITIONER

## 2023-06-01 PROCEDURE — 99214 OFFICE O/P EST MOD 30 MIN: CPT | Performed by: NURSE PRACTITIONER

## 2023-06-01 NOTE — PROGRESS NOTES
Subjective   Patient ID: Sweta Lim is a 67 y.o. female who presents for Follow-up (Pt is not fasting/Did not do 24 hour urine test /Pt did not do appt for psychologist/Pt did not set up gi beto/Pt does not have a pulmonology dr/ ).  Last physical: >1yr  Cbc 5/2023 nl  Kassandra vicente ordered cmp lipid   Is pt fasting? no  Did pt do 24hr urine test? no    Did pt set up psychologist appt? no  Did pt set up gi dr beto as directed by the cardio NP? no  Does pt have a pulmonology dr (lung dr)? No    Seeing chiropractor for back pain  Incr in falls    HPI  Went to dentist cleaning and had sob and asthma attack  Pt given xanax and has albuterol inh to use before next dental appt.  Pt also given referral to psychologist  Dentist would like clearance for pt to have deep cleaning    Pt saw cardio NP.   Pt told this provider that she has sob when doing adl's and climbing stairs; pt now denies this  Pt also told this provider that she has difficulty eating food and has been only have liquid protein shakes. She is down 25lbs from 4/4/23  Pt was referred to a gi dr from this provider    No wheezing or tight chest  No fever or chills  No dizziness , HA or vision change   Last use of albuterol inh    No diarrhea, constipation, n/v, heartburn, gas, belching, bloating, abd pain, difficulty swallowing    Diabetes controlled        No care team member to display     Review of Systems   Constitutional: Negative.    Respiratory:  Negative for shortness of breath and wheezing.    Cardiovascular:  Negative for chest pain.       Objective   Visit Vitals  /58   Pulse 61   Temp 36.3 °C (97.4 °F)      BP Readings from Last 3 Encounters:   06/01/23 101/58   05/04/23 125/68   03/30/23 115/59     Wt Readings from Last 3 Encounters:   06/01/23 74.8 kg (165 lb)   05/04/23 78 kg (172 lb)   03/30/23 78 kg (172 lb)       Physical Exam  Constitutional:       General: She is not in acute distress.     Appearance: Normal appearance.    Cardiovascular:      Rate and Rhythm: Normal rate. Rhythm irregular.      Heart sounds: No murmur heard.     Comments: Pause after the 2 beats  Pulmonary:      Effort: Pulmonary effort is normal.      Breath sounds: Wheezing present. No rhonchi or rales.   Neurological:      Mental Status: She is alert.         Assessment/Plan   Diagnoses and all orders for this visit:  SOB (shortness of breath)  -     Referral to Pulmonology; Future  -     XR chest 2 views; Future  Mild intermittent asthma without complication  -     Referral to Pulmonology; Future  -     XR chest 2 views; Future  Anxiety and depression  Microalbuminuria  Decreased appetite  -     Referral to Gastroenterology; Future  Neck pain  -     Referral to Medical Spine; Future  Other orders  -     Follow Up In Primary Care; Future

## 2023-06-01 NOTE — PATIENT INSTRUCTIONS
Pt will need cardiology, pulmonary and gastroenterology clearance before can have a deep dental cleaning; I will need the 3 clearance letters to be able to clear the pt for the deep dental cleaning  Kassandra vicente  [facility]  [address] [place] [address]  [phone]    Pulmonology [address] [place] [address]  Gastroenterology [address] [place] [address]    Set up psychologist appt  Set up spine dr  Seeing dr morrison in July-neurosurg  Seeing dr morrison in oct-neuro    Has albuterol inhaler and xanax to use before appt    Chest xray today    Needs fasting labs from cardio NP  You can use the lab in our building when fasting. The hrs are: Monday-Thursday, 7 a.m. - 6 p.m., Friday, 7 a.m. - 5 p.m., Saturday 8 a.m. - 12 noon.   No appt needed, BUT YOU DO NEED THE PAPER ORDER.    Fasting is no food, drink, gum or mints other than water for 8-12 hrs.   Results will be back in 2-3 business days for most labs. It is always recommended for any orders (labs, xrays, ultrasounds,MRI, ct scan, procedures etc) to check with your insurance provider for expected costs or expenses to you.     Do 24hr urine test    Follow up 3mon for cpe and follow up on getting a dental cleaning      I will communicate with you via [employer] regarding messages and results. If you need help with this, you can call the support line at [phone].    IT WAS A PLEASURE TO SEE YOU TODAY. THANK YOU FOR CHOOSING US FOR YOUR HEALTHCARE NEEDS.

## 2023-06-05 ENCOUNTER — TELEPHONE (OUTPATIENT)
Dept: PRIMARY CARE | Facility: CLINIC | Age: 68
End: 2023-06-05
Payer: MEDICARE

## 2023-06-05 LAB
NON-UH HIE A/G RATIO: 0.8
NON-UH HIE ALB: 3.4 G/DL (ref 3.4–5)
NON-UH HIE ALK PHOS: 120 UNIT/L (ref 46–116)
NON-UH HIE BASO COUNT: 0.06 X1000 (ref 0–0.2)
NON-UH HIE BASOS %: 1.1 %
NON-UH HIE BILIRUBIN, TOTAL: 0.4 MG/DL (ref 0.2–1)
NON-UH HIE BUN/CREAT RATIO: 21.4
NON-UH HIE BUN: 15 MG/DL (ref 9–23)
NON-UH HIE CALCIUM: 9.5 MG/DL (ref 8.7–10.4)
NON-UH HIE CALCULATED LDL CHOLESTEROL: 135 MG/DL (ref 60–130)
NON-UH HIE CALCULATED OSMOLALITY: 281 MOSM/KG (ref 275–295)
NON-UH HIE CHLORIDE: 107 MMOL/L (ref 98–107)
NON-UH HIE CHOLESTEROL: 212 MG/DL (ref 100–200)
NON-UH HIE CO2, VENOUS: 29 MMOL/L (ref 20–31)
NON-UH HIE CREATININE: 0.7 MG/DL (ref 0.5–0.8)
NON-UH HIE DIFF?: NO
NON-UH HIE EOS COUNT: 0.09 X1000 (ref 0–0.5)
NON-UH HIE EOSIN %: 1.8 %
NON-UH HIE GFR AA: >60
NON-UH HIE GLOBULIN: 4.1 G/DL
NON-UH HIE GLOMERULAR FILTRATION RATE: >60 ML/MIN/1.73M?
NON-UH HIE GLUCOSE: 85 MG/DL (ref 74–106)
NON-UH HIE GOT: 26 UNIT/L (ref 15–37)
NON-UH HIE GPT: 36 UNIT/L (ref 10–49)
NON-UH HIE HCT: 49.3 % (ref 36–46)
NON-UH HIE HDL CHOLESTEROL: 53 MG/DL (ref 40–60)
NON-UH HIE HGB: 16.3 G/DL (ref 12–16)
NON-UH HIE INSTR WBC: 4.9
NON-UH HIE K: 4.2 MMOL/L (ref 3.5–5.1)
NON-UH HIE LYMPH %: 30.6 %
NON-UH HIE LYMPH COUNT: 1.51 X1000 (ref 1.2–4.8)
NON-UH HIE MCH: 33.8 PG (ref 27–34)
NON-UH HIE MCHC: 33.1 G/DL (ref 32–37)
NON-UH HIE MCV: 102.3 FL (ref 80–100)
NON-UH HIE MONO %: 6.3 %
NON-UH HIE MONO COUNT: 0.31 X1000 (ref 0.1–1)
NON-UH HIE MPV: 9.2 FL (ref 7.4–10.4)
NON-UH HIE NA: 141 MMOL/L (ref 135–145)
NON-UH HIE NEUTROPHIL %: 60.1 %
NON-UH HIE NEUTROPHIL COUNT (ANC): 2.96 X1000 (ref 1.4–8.8)
NON-UH HIE NUCLEATED RBC: 0 /100WBC
NON-UH HIE PLATELET: 290 X10 (ref 150–450)
NON-UH HIE RBC: 4.82 X10 (ref 4.2–5.4)
NON-UH HIE RDW: 15.7 % (ref 11.5–14.5)
NON-UH HIE TOTAL CHOL/HDL CHOL RATIO: 4
NON-UH HIE TOTAL PROTEIN: 7.5 G/DL (ref 5.7–8.2)
NON-UH HIE TRIGLYCERIDES: 118 MG/DL (ref 30–150)
NON-UH HIE WBC: 4.9 X10 (ref 4.5–11)

## 2023-06-05 NOTE — TELEPHONE ENCOUNTER
Pt came in and is confused if she is supposed to be taking simvastatin? She said she got blood work done and wasn't sure.  If she is supposed to be taking it [facility] [place]

## 2023-06-06 ENCOUNTER — TELEPHONE (OUTPATIENT)
Dept: PRIMARY CARE | Facility: CLINIC | Age: 68
End: 2023-06-06
Payer: MEDICARE

## 2023-06-06 NOTE — TELEPHONE ENCOUNTER
----- Message from LUIGI Chan sent at 6/2/2023  7:29 AM EDT -----  Xray showed possible chronic bronchitis. Plan is to have you see a pulmonologist.  Pt got the referral yesterday. She can call [phone] to schedule this. The phone number is on the referral paper.      Sugar (aka glucose), kidney function, liver function and electrolytes in the CMP (comprehensive metabolic panel) were normal.   Hdl and trigs normal   Ldl high at 135. Goal <100. Decr fats (decrease portions of snacks and desserts) and incr fiber (increase veggies to at least 2 a day and at least 1 fruit a day).   CBC (complete blood count) was normal which looks at infection and anemia markers.

## 2023-06-08 ENCOUNTER — TELEPHONE (OUTPATIENT)
Dept: PRIMARY CARE | Facility: CLINIC | Age: 68
End: 2023-06-08
Payer: MEDICARE

## 2023-06-15 ENCOUNTER — APPOINTMENT (OUTPATIENT)
Dept: PRIMARY CARE | Facility: CLINIC | Age: 68
End: 2023-06-15
Payer: MEDICARE

## 2023-07-05 ENCOUNTER — APPOINTMENT (OUTPATIENT)
Dept: PRIMARY CARE | Facility: CLINIC | Age: 68
End: 2023-07-05
Payer: MEDICARE

## 2023-08-01 ENCOUNTER — TELEPHONE (OUTPATIENT)
Dept: PRIMARY CARE | Facility: CLINIC | Age: 68
End: 2023-08-01
Payer: MEDICARE

## 2023-08-01 NOTE — TELEPHONE ENCOUNTER
Patient fingers are getting numb and she would like to get in soon.  Is there a time we can squeeze her in?

## 2023-08-03 NOTE — TELEPHONE ENCOUNTER
Moved pt. Appointment up to Monday at 12:00 since she is experiencing knumbness in her hands from a shot she received.

## 2023-08-04 NOTE — TELEPHONE ENCOUNTER
Patient called back and spoke with Le, she received a prolia shot in her arm.  Advised she make aware the provider that gave it to her for their recommendations or come here.

## 2023-08-06 NOTE — PROGRESS NOTES
"Subjective   Patient ID: Sweta Lim is a 68 y.o. female who presents for Follow-up (Pt is here for F/U /Did the pt see pulm dr; will see next week /If yes name and lala/Pt was referred to rheum dr by neurosurg; did pt make an appt? Was seen last week with Dr. reid/Saw gi dr and needed egd and colon; did pt set these up? She is scheduled it /Did pt do 24hr urine test? Did not do it/Did pt do mammogram? No /Sees [facility] (dr griggs) for diabetes/ /Looks like still needs clearance from gi, cardio and pulm for dental procedure/Has dr tamiko adamson appt in oct/).    Did the pt see pulm dr-next wk  Pt was referred to rheum dr by neurosurg; did pt make an appt? Dr reid  Saw gi dr and needed egd and colon; did pt set these up? She is scheduling it  Did pt do 24hr urine test? no  Did pt do mammogram? no  Sees [facility] (dr griggs) for diabetes    Looks like still needs clearance from gi, cardio and pulm for dental procedure  Has dr tamiko adamson appt in oct    Going to a smoking cessation class; smoking since age 17    No care team member to display    HPI  Last labs-7/2023 A1c 5.9, tsh nl, t4 nl  6/2023 Sugar (aka glucose), kidney function, liver function and electrolytes in the CMP (comprehensive metabolic panel) were normal.  Hdl and trigs normal  Ldl high at 135. Goal <100. Decr fats (decrease portions of snacks and desserts) and incr fiber (increase veggies to at least 2 a day and at least 1 fruit a day).  CBC (complete blood count) was normal which looks at infection and anemia markers.  Due for labs- none    Rt 4th and 5th fingers and lf 4th and 5th fingers with numbness x2wks; starting to get into the 2nd and 3rd fingers bilat  Got prolia 2 wks-lf deltoid    Has h/o neck pain  Sees dr morrison    No results found for: \"CHOL\"  No results found for: \"TRIG\"  No results found for: \"HDL\"  No results found for: \"LDL\"  No results found for: \"TSH\"  No results found for: \"A1C\"  No components found for: \"POCA1C\"  No results found " "for: \"ALBUR\"  No components found for: \"POCALBUR\"        Immunization History   Administered Date(s) Administered    Influenza, seasonal, injectable 10/02/2020    Moderna SARS-CoV-2 Vaccination 04/07/2021, 05/05/2021, 01/08/2022    Pneumococcal polysaccharide vaccine, 23-valent, age 2 years and older (PNEUMOVAX 23) 10/02/2020, 10/02/2022    Td vaccine, age 7 years and older (TDVAX) 11/10/2020    Tdap vaccine, age 10 years and older (BOOSTRIX) 01/21/2013               Review of Systems   Constitutional: Negative.    Respiratory:  Negative for shortness of breath and wheezing.    Cardiovascular:  Negative for chest pain.   Musculoskeletal:  Positive for neck pain.   Neurological:  Positive for numbness.       Objective   Visit Vitals  /80   Pulse 65   Temp 36.1 °C (97 °F)      BP Readings from Last 3 Encounters:   08/07/23 133/80   06/01/23 101/58   05/22/23 150/80     Wt Readings from Last 3 Encounters:   08/07/23 73.9 kg (163 lb)   06/01/23 74.8 kg (165 lb)   05/22/23 75.8 kg (167 lb)           Physical Exam  Constitutional:       General: She is not in acute distress.     Appearance: Normal appearance.   Neurological:      Mental Status: She is alert.         Assessment/Plan   Diagnoses and all orders for this visit:  Neck pain  -     tiZANidine (Zanaflex) 2 mg tablet; Take 1 tablet (2 mg) by mouth every 8 hours if needed for muscle spasms.  -     predniSONE (Deltasone) 20 mg tablet; Take 2 tabs daily x 5d then 1 tab daily x 3d then 1/2 tab daily x 3d  Cervical radiculopathy  -     tiZANidine (Zanaflex) 2 mg tablet; Take 1 tablet (2 mg) by mouth every 8 hours if needed for muscle spasms.  -     predniSONE (Deltasone) 20 mg tablet; Take 2 tabs daily x 5d then 1 tab daily x 3d then 1/2 tab daily x 3d  Dyslipidemia  -     simvastatin (Zocor) 40 mg tablet; Take 1 tablet (40 mg) by mouth once daily.  Other orders  -     Follow Up In Primary Care  -     Follow Up In Primary Care - Health Maintenance; Future         "

## 2023-08-07 ENCOUNTER — OFFICE VISIT (OUTPATIENT)
Dept: PRIMARY CARE | Facility: CLINIC | Age: 68
End: 2023-08-07
Payer: MEDICARE

## 2023-08-07 VITALS
BODY MASS INDEX: 25.58 KG/M2 | TEMPERATURE: 97 F | OXYGEN SATURATION: 92 % | HEIGHT: 67 IN | SYSTOLIC BLOOD PRESSURE: 133 MMHG | HEART RATE: 65 BPM | DIASTOLIC BLOOD PRESSURE: 80 MMHG | WEIGHT: 163 LBS

## 2023-08-07 DIAGNOSIS — M54.2 NECK PAIN: Primary | ICD-10-CM

## 2023-08-07 DIAGNOSIS — M54.12 CERVICAL RADICULOPATHY: ICD-10-CM

## 2023-08-07 DIAGNOSIS — E78.5 DYSLIPIDEMIA: ICD-10-CM

## 2023-08-07 PROCEDURE — 1159F MED LIST DOCD IN RCRD: CPT | Performed by: NURSE PRACTITIONER

## 2023-08-07 PROCEDURE — 1160F RVW MEDS BY RX/DR IN RCRD: CPT | Performed by: NURSE PRACTITIONER

## 2023-08-07 PROCEDURE — 99214 OFFICE O/P EST MOD 30 MIN: CPT | Performed by: NURSE PRACTITIONER

## 2023-08-07 PROCEDURE — 1125F AMNT PAIN NOTED PAIN PRSNT: CPT | Performed by: NURSE PRACTITIONER

## 2023-08-07 PROCEDURE — 3075F SYST BP GE 130 - 139MM HG: CPT | Performed by: NURSE PRACTITIONER

## 2023-08-07 PROCEDURE — 3079F DIAST BP 80-89 MM HG: CPT | Performed by: NURSE PRACTITIONER

## 2023-08-07 RX ORDER — PREDNISONE 20 MG/1
TABLET ORAL
Qty: 15 TABLET | Refills: 0 | Status: SHIPPED | OUTPATIENT
Start: 2023-08-07 | End: 2023-10-17 | Stop reason: ALTCHOICE

## 2023-08-07 RX ORDER — TIZANIDINE 2 MG/1
2 TABLET ORAL EVERY 8 HOURS PRN
Qty: 90 TABLET | Refills: 0 | Status: SHIPPED | OUTPATIENT
Start: 2023-08-07 | End: 2023-08-30

## 2023-08-07 RX ORDER — SIMVASTATIN 40 MG/1
40 TABLET, FILM COATED ORAL DAILY
Qty: 90 TABLET | Refills: 1 | Status: SHIPPED | OUTPATIENT
Start: 2023-08-07 | End: 2024-02-08

## 2023-08-07 ASSESSMENT — ENCOUNTER SYMPTOMS
CONSTITUTIONAL NEGATIVE: 1
WHEEZING: 0
NECK PAIN: 1
SHORTNESS OF BREATH: 0
NUMBNESS: 1

## 2023-08-07 ASSESSMENT — PATIENT HEALTH QUESTIONNAIRE - PHQ9
2. FEELING DOWN, DEPRESSED OR HOPELESS: NOT AT ALL
SUM OF ALL RESPONSES TO PHQ9 QUESTIONS 1 AND 2: 0
1. LITTLE INTEREST OR PLEASURE IN DOING THINGS: NOT AT ALL

## 2023-08-07 NOTE — PATIENT INSTRUCTIONS
Do 24hr urine  Set up mammogram    Tizanidine  Prednisone  Stop dr reid's prednisone while on my rx of prednisone  See dr morrison    Keep pulm dr dickey  Schedule the colonoscopy and endoscopy  Keep neuro dr dickey in oct    Goal LDL <100 (135)  Goal trigs <150  Goal HDL >50  Exercise-cardio 4-5d/wk 30min each day  Diet-Breakfast-toast (my favorite Jane Vasquez Delightful multi-grain and Sam's Killer Bread thin-sliced Good Seed)/bagel/English muffin-whole wheat flour as a 1st ingredient or cereal/oatmeal/granola bar-fiber 4g or more; protein like eggs or peanut butter is Ok  Lunch-protein, veg 1c  Dinner-protein, veg 1c; if having potatoes, rice, noodles, or pasta-->fist sized; could try brown rice or whole wheat pasta or quinoa  Fruit 2 a day  Dairy 2 a day-milk, almond milk, soy milk, yogurt, cottage cheese, yogurt  Snacks-Protein-hard boiled egg, nuts (walnuts/almonds/pecans/pistachios 1/4c), hummus, beef/deer jerky; vegetable, fruit, dairy-milk(1%, skim, almond, soy)/cheese (not a lot of cheddar)/yogurt (Greek is best-my favorite Dannon Fruit on the Bottom Greek)/cottage cheese 2%; triscuits, popcorn have a lot of fiber; serving size  Water  Limit alcohol to 2 drinks per day (1 drink=12oz beer or 5oz wine or 1 1/2oz liquor)  appt in  1  month for wellness; be fasting      I will communicate with you via Jooob[employer]! regarding messages and results. If you need help with this, you can call the support line at [phone].    IT WAS A PLEASURE TO SEE YOU TODAY. THANK YOU FOR CHOOSING US FOR YOUR HEALTHCARE NEEDS.

## 2023-08-08 ENCOUNTER — TELEPHONE (OUTPATIENT)
Dept: PRIMARY CARE | Facility: CLINIC | Age: 68
End: 2023-08-08

## 2023-08-08 DIAGNOSIS — L91.8 SKIN TAG: Primary | ICD-10-CM

## 2023-08-08 NOTE — TELEPHONE ENCOUNTER
Patient called asking for a referral for dermatologist for moles, warts, and finger nails are splitting.  Scheduled with a derm but not available until January, who for a sooner appointment?  Please advise.

## 2023-08-15 ENCOUNTER — HOSPITAL ENCOUNTER (OUTPATIENT)
Dept: DATA CONVERSION | Facility: HOSPITAL | Age: 68
End: 2023-08-15
Attending: STUDENT IN AN ORGANIZED HEALTH CARE EDUCATION/TRAINING PROGRAM | Admitting: STUDENT IN AN ORGANIZED HEALTH CARE EDUCATION/TRAINING PROGRAM
Payer: MEDICARE

## 2023-08-15 DIAGNOSIS — Z12.11 ENCOUNTER FOR SCREENING FOR MALIGNANT NEOPLASM OF COLON: ICD-10-CM

## 2023-08-15 DIAGNOSIS — K64.8 OTHER HEMORRHOIDS: ICD-10-CM

## 2023-08-15 DIAGNOSIS — R13.10 DYSPHAGIA, UNSPECIFIED: ICD-10-CM

## 2023-08-15 DIAGNOSIS — J45.909 UNSPECIFIED ASTHMA, UNCOMPLICATED (HHS-HCC): ICD-10-CM

## 2023-08-15 DIAGNOSIS — K64.4 RESIDUAL HEMORRHOIDAL SKIN TAGS: ICD-10-CM

## 2023-08-15 DIAGNOSIS — K20.90 ESOPHAGITIS, UNSPECIFIED WITHOUT BLEEDING: ICD-10-CM

## 2023-08-15 DIAGNOSIS — D12.4 BENIGN NEOPLASM OF DESCENDING COLON: ICD-10-CM

## 2023-08-15 DIAGNOSIS — D12.3 BENIGN NEOPLASM OF TRANSVERSE COLON: ICD-10-CM

## 2023-08-15 DIAGNOSIS — E78.5 HYPERLIPIDEMIA, UNSPECIFIED: ICD-10-CM

## 2023-08-15 DIAGNOSIS — F41.9 ANXIETY DISORDER, UNSPECIFIED: ICD-10-CM

## 2023-08-15 DIAGNOSIS — F17.210 NICOTINE DEPENDENCE, CIGARETTES, UNCOMPLICATED: ICD-10-CM

## 2023-08-15 DIAGNOSIS — Z79.52 LONG TERM (CURRENT) USE OF SYSTEMIC STEROIDS: ICD-10-CM

## 2023-08-15 DIAGNOSIS — K31.89 OTHER DISEASES OF STOMACH AND DUODENUM: ICD-10-CM

## 2023-08-15 DIAGNOSIS — I10 ESSENTIAL (PRIMARY) HYPERTENSION: ICD-10-CM

## 2023-08-15 DIAGNOSIS — K57.30 DIVERTICULOSIS OF LARGE INTESTINE WITHOUT PERFORATION OR ABSCESS WITHOUT BLEEDING: ICD-10-CM

## 2023-08-15 DIAGNOSIS — K44.9 DIAPHRAGMATIC HERNIA WITHOUT OBSTRUCTION OR GANGRENE: ICD-10-CM

## 2023-08-15 DIAGNOSIS — E11.9 TYPE 2 DIABETES MELLITUS WITHOUT COMPLICATIONS (MULTI): ICD-10-CM

## 2023-08-15 DIAGNOSIS — G47.30 SLEEP APNEA, UNSPECIFIED: ICD-10-CM

## 2023-08-15 DIAGNOSIS — K29.70 GASTRITIS, UNSPECIFIED, WITHOUT BLEEDING: ICD-10-CM

## 2023-08-15 LAB
POCT GLUCOSE: 156 MG/DL (ref 74–99)
POCT GLUCOSE: 60 MG/DL (ref 74–99)

## 2023-08-20 PROBLEM — R63.4 WEIGHT LOSS, ABNORMAL: Status: ACTIVE | Noted: 2023-08-20

## 2023-08-20 PROBLEM — E78.00 HYPERCHOLESTEROLEMIA: Status: ACTIVE | Noted: 2021-09-29

## 2023-08-20 PROBLEM — M48.02 CERVICAL STENOSIS OF SPINE: Status: ACTIVE | Noted: 2023-08-20

## 2023-08-20 PROBLEM — E89.0 HISTORY OF THYROIDECTOMY: Status: ACTIVE | Noted: 2023-03-06

## 2023-08-20 PROBLEM — M96.1 POSTLAMINECTOMY SYNDROME OF CERVICAL REGION: Status: ACTIVE | Noted: 2021-09-29

## 2023-08-20 PROBLEM — Z98.890 HISTORY OF THYROIDECTOMY: Status: ACTIVE | Noted: 2023-03-06

## 2023-08-20 PROBLEM — I65.23 ATHEROSCLEROSIS OF BOTH CAROTID ARTERIES: Status: ACTIVE | Noted: 2023-08-20

## 2023-08-20 PROBLEM — M54.2 CERVICALGIA: Status: ACTIVE | Noted: 2019-05-18

## 2023-08-20 PROBLEM — E66.9 OBESITY: Status: ACTIVE | Noted: 2020-07-29

## 2023-08-20 PROBLEM — R12 HEARTBURN: Status: ACTIVE | Noted: 2023-08-20

## 2023-08-20 PROBLEM — E55.9 MILD VITAMIN D DEFICIENCY: Status: ACTIVE | Noted: 2023-03-06

## 2023-08-20 PROBLEM — M54.12 CERVICAL RADICULOPATHY: Status: ACTIVE | Noted: 2023-08-20

## 2023-08-20 PROBLEM — Z90.89 HISTORY OF THYROIDECTOMY: Status: ACTIVE | Noted: 2023-03-06

## 2023-08-20 PROBLEM — F41.1 GENERALIZED ANXIETY DISORDER: Status: ACTIVE | Noted: 2023-08-20

## 2023-08-20 PROBLEM — M81.8 OSTEOPOROSIS, DISUSE: Status: ACTIVE | Noted: 2023-08-20

## 2023-08-20 PROBLEM — E06.3 HASHIMOTO'S THYROIDITIS: Status: ACTIVE | Noted: 2020-07-29

## 2023-08-20 PROBLEM — Z04.9 CONDITION NOT FOUND: Status: ACTIVE | Noted: 2023-08-20

## 2023-08-20 PROBLEM — I11.9 HYPERTENSIVE HEART DISEASE: Status: ACTIVE | Noted: 2021-09-29

## 2023-08-20 PROBLEM — I35.9 AORTIC VALVE DISEASE: Status: ACTIVE | Noted: 2023-08-20

## 2023-08-20 PROBLEM — E66.09 EXOGENOUS OBESITY: Status: ACTIVE | Noted: 2023-03-06

## 2023-08-20 PROBLEM — G25.81 RESTLESS LEGS SYNDROME: Status: ACTIVE | Noted: 2021-09-29

## 2023-08-20 PROBLEM — M47.814 SPONDYLOSIS OF THORACIC SPINE: Status: ACTIVE | Noted: 2021-09-29

## 2023-08-20 PROBLEM — R06.02 SHORTNESS OF BREATH ON EXERTION: Status: ACTIVE | Noted: 2023-08-20

## 2023-08-20 PROBLEM — F17.210 CIGARETTE SMOKER: Status: ACTIVE | Noted: 2023-08-20

## 2023-08-20 PROBLEM — F41.9 ANXIETY: Status: ACTIVE | Noted: 2023-08-20

## 2023-08-20 RX ORDER — LISINOPRIL 5 MG/1
1 TABLET ORAL DAILY
COMMUNITY
Start: 2017-08-14 | End: 2023-11-03 | Stop reason: ALTCHOICE

## 2023-08-20 RX ORDER — BUSPIRONE HYDROCHLORIDE 10 MG/1
TABLET ORAL
COMMUNITY
End: 2023-10-17 | Stop reason: ALTCHOICE

## 2023-08-20 RX ORDER — GABAPENTIN 300 MG/1
300 CAPSULE ORAL 3 TIMES DAILY
COMMUNITY
End: 2023-11-03 | Stop reason: ALTCHOICE

## 2023-08-20 RX ORDER — CLONAZEPAM 1 MG/1
1 TABLET ORAL 2 TIMES DAILY
COMMUNITY
Start: 2023-08-08 | End: 2023-12-07 | Stop reason: SDUPTHER

## 2023-08-20 RX ORDER — PEN NEEDLE, DIABETIC 32GX 5/32"
1 NEEDLE, DISPOSABLE MISCELLANEOUS DAILY
COMMUNITY
Start: 2023-05-26

## 2023-08-20 RX ORDER — EZETIMIBE 10 MG/1
1 TABLET ORAL DAILY
COMMUNITY
Start: 2016-08-26

## 2023-08-20 RX ORDER — ALENDRONATE SODIUM 70 MG/1
1 TABLET ORAL
COMMUNITY
Start: 2023-06-07

## 2023-08-20 RX ORDER — LANCETS 28 GAUGE
1 EACH MISCELLANEOUS DAILY
COMMUNITY
Start: 2023-06-14

## 2023-08-20 RX ORDER — COLCHICINE 0.6 MG/1
1 TABLET ORAL DAILY
COMMUNITY

## 2023-08-20 RX ORDER — GLIMEPIRIDE 4 MG/1
1 TABLET ORAL
COMMUNITY
Start: 2017-08-14 | End: 2023-11-03 | Stop reason: ALTCHOICE

## 2023-08-20 RX ORDER — LEVOTHYROXINE SODIUM 75 UG/1
75 TABLET ORAL DAILY
COMMUNITY

## 2023-08-20 RX ORDER — GABAPENTIN 300 MG/1
1 CAPSULE ORAL 2 TIMES DAILY
COMMUNITY
Start: 2022-10-05

## 2023-08-20 RX ORDER — METHOCARBAMOL 750 MG/1
1 TABLET, FILM COATED ORAL EVERY 4 HOURS
COMMUNITY
Start: 2019-09-18

## 2023-08-20 RX ORDER — METFORMIN HYDROCHLORIDE 500 MG/1
3 TABLET, EXTENDED RELEASE ORAL DAILY
COMMUNITY
Start: 2018-04-24

## 2023-08-20 RX ORDER — TRAZODONE HYDROCHLORIDE 50 MG/1
1-2 TABLET ORAL NIGHTLY
COMMUNITY
Start: 2012-01-06 | End: 2023-12-07 | Stop reason: SDUPTHER

## 2023-08-20 RX ORDER — OMEPRAZOLE 40 MG/1
1 CAPSULE, DELAYED RELEASE ORAL 2 TIMES DAILY
COMMUNITY
Start: 2023-08-04 | End: 2023-10-16 | Stop reason: DRUGHIGH

## 2023-08-20 RX ORDER — POLYETHYLENE GLYCOL 3350 17 G/17G
17 POWDER, FOR SOLUTION ORAL 2 TIMES DAILY
COMMUNITY
Start: 2023-08-04

## 2023-08-21 LAB
COMPLETE PATHOLOGY REPORT: NORMAL
CONVERTED CLINICAL DIAGNOSIS-HISTORY: NORMAL
CONVERTED FINAL DIAGNOSIS: NORMAL
CONVERTED FINAL REPORT PDF LINK TO COPY AND PASTE: NORMAL
CONVERTED GROSS DESCRIPTION: NORMAL

## 2023-08-30 ENCOUNTER — TELEPHONE (OUTPATIENT)
Dept: PRIMARY CARE | Facility: CLINIC | Age: 68
End: 2023-08-30
Payer: MEDICARE

## 2023-08-30 DIAGNOSIS — M54.2 NECK PAIN: ICD-10-CM

## 2023-08-30 DIAGNOSIS — M54.12 CERVICAL RADICULOPATHY: ICD-10-CM

## 2023-08-30 RX ORDER — TIZANIDINE 2 MG/1
2 TABLET ORAL EVERY 8 HOURS PRN
Qty: 90 TABLET | Refills: 0 | Status: SHIPPED | OUTPATIENT
Start: 2023-08-30 | End: 2023-10-04

## 2023-09-01 ENCOUNTER — APPOINTMENT (OUTPATIENT)
Dept: PRIMARY CARE | Facility: CLINIC | Age: 68
End: 2023-09-01
Payer: MEDICARE

## 2023-09-29 VITALS — WEIGHT: 182.98 LBS | HEIGHT: 67 IN | BODY MASS INDEX: 28.72 KG/M2

## 2023-10-02 ENCOUNTER — APPOINTMENT (OUTPATIENT)
Dept: NEUROLOGY | Facility: CLINIC | Age: 68
End: 2023-10-02
Payer: MEDICARE

## 2023-10-04 DIAGNOSIS — M54.2 NECK PAIN: ICD-10-CM

## 2023-10-04 DIAGNOSIS — M54.12 CERVICAL RADICULOPATHY: ICD-10-CM

## 2023-10-04 RX ORDER — TIZANIDINE 2 MG/1
2 TABLET ORAL EVERY 8 HOURS PRN
Qty: 90 TABLET | Refills: 2 | Status: SHIPPED | OUTPATIENT
Start: 2023-10-04 | End: 2023-10-17 | Stop reason: ALTCHOICE

## 2023-10-11 ENCOUNTER — TELEMEDICINE CLINICAL SUPPORT (OUTPATIENT)
Dept: CARDIAC REHAB | Facility: CLINIC | Age: 68
End: 2023-10-11
Payer: MEDICARE

## 2023-10-11 DIAGNOSIS — F17.200 TOBACCO USE DISORDER: Primary | ICD-10-CM

## 2023-10-11 PROCEDURE — 99407 BEHAV CHNG SMOKING > 10 MIN: CPT | Mod: 95 | Performed by: INTERNAL MEDICINE

## 2023-10-12 RX ORDER — ASPIRIN/CALCIUM CARB/MAGNESIUM 325 MG
4 TABLET ORAL EVERY 2 HOUR PRN
Qty: 100 LOZENGE | Refills: 0 | Status: SHIPPED | OUTPATIENT
Start: 2023-10-12 | End: 2023-11-11

## 2023-10-12 RX ORDER — IBUPROFEN 200 MG
1 TABLET ORAL ONCE
Status: SHIPPED | OUTPATIENT
Start: 2023-10-12

## 2023-10-12 NOTE — PROGRESS NOTES
Tobacco Treatment Counseling     Start Time: 3:00 PM  End Time: 3:33 PM  Patient presents for follow up session for tobacco treatment counseling. Since her last session, she states that she still continues to struggle and is currently smoking ¾ PPD. Reminded her that quitting is a process and it can take time. She states that she can go a few days without smoking, but then relapses. Discussed that quitting on your own is challenging and that what she is experiencing is withdrawal. Discussed trying NRT or a medication to make the process of quitting easier and patient would like to try 21mg patches in addition to 4mg lozenges. RX placed for both. Educated patient on proper use of both NRT agents and she understood instruction. She feels very motivated to quit due to her needing neck surgery. She does not have a surgery date yet, but will see her surgeon Oct. 17th. No target quit date has been established at this time. The plan is to start combination NRT and follow up in 3 weeks.    Tobacco Treatment Program Staff: Mary He, RRT

## 2023-10-16 ENCOUNTER — OFFICE VISIT (OUTPATIENT)
Dept: GASTROENTEROLOGY | Facility: CLINIC | Age: 68
End: 2023-10-16
Payer: MEDICARE

## 2023-10-16 VITALS
BODY MASS INDEX: 25.74 KG/M2 | DIASTOLIC BLOOD PRESSURE: 68 MMHG | HEART RATE: 57 BPM | SYSTOLIC BLOOD PRESSURE: 112 MMHG | WEIGHT: 164 LBS | HEIGHT: 67 IN

## 2023-10-16 DIAGNOSIS — R12 HEARTBURN: ICD-10-CM

## 2023-10-16 DIAGNOSIS — F10.10 ALCOHOL ABUSE: ICD-10-CM

## 2023-10-16 DIAGNOSIS — Z12.11 ENCOUNTER FOR SCREENING FOR MALIGNANT NEOPLASM OF COLON: Primary | ICD-10-CM

## 2023-10-16 DIAGNOSIS — R13.10 DYSPHAGIA, UNSPECIFIED TYPE: ICD-10-CM

## 2023-10-16 DIAGNOSIS — R94.5 ABNORMAL RESULTS OF LIVER FUNCTION STUDIES: ICD-10-CM

## 2023-10-16 DIAGNOSIS — R14.0 ABDOMINAL BLOATING: ICD-10-CM

## 2023-10-16 DIAGNOSIS — Z11.59 ENCOUNTER FOR SCREENING FOR OTHER VIRAL DISEASES: ICD-10-CM

## 2023-10-16 PROCEDURE — 3078F DIAST BP <80 MM HG: CPT | Performed by: STUDENT IN AN ORGANIZED HEALTH CARE EDUCATION/TRAINING PROGRAM

## 2023-10-16 PROCEDURE — 99214 OFFICE O/P EST MOD 30 MIN: CPT | Performed by: STUDENT IN AN ORGANIZED HEALTH CARE EDUCATION/TRAINING PROGRAM

## 2023-10-16 PROCEDURE — 1125F AMNT PAIN NOTED PAIN PRSNT: CPT | Performed by: STUDENT IN AN ORGANIZED HEALTH CARE EDUCATION/TRAINING PROGRAM

## 2023-10-16 PROCEDURE — 1159F MED LIST DOCD IN RCRD: CPT | Performed by: STUDENT IN AN ORGANIZED HEALTH CARE EDUCATION/TRAINING PROGRAM

## 2023-10-16 PROCEDURE — 4010F ACE/ARB THERAPY RXD/TAKEN: CPT | Performed by: STUDENT IN AN ORGANIZED HEALTH CARE EDUCATION/TRAINING PROGRAM

## 2023-10-16 PROCEDURE — 1160F RVW MEDS BY RX/DR IN RCRD: CPT | Performed by: STUDENT IN AN ORGANIZED HEALTH CARE EDUCATION/TRAINING PROGRAM

## 2023-10-16 PROCEDURE — 3074F SYST BP LT 130 MM HG: CPT | Performed by: STUDENT IN AN ORGANIZED HEALTH CARE EDUCATION/TRAINING PROGRAM

## 2023-10-16 RX ORDER — OMEPRAZOLE 40 MG/1
40 CAPSULE, DELAYED RELEASE ORAL
Qty: 30 CAPSULE | Refills: 11 | Status: SHIPPED | OUTPATIENT
Start: 2023-10-16 | End: 2024-02-09

## 2023-10-16 RX ORDER — SODIUM CHLORIDE, SODIUM LACTATE, POTASSIUM CHLORIDE, CALCIUM CHLORIDE 600; 310; 30; 20 MG/100ML; MG/100ML; MG/100ML; MG/100ML
20 INJECTION, SOLUTION INTRAVENOUS CONTINUOUS
Status: CANCELLED | OUTPATIENT
Start: 2023-10-16

## 2023-10-16 RX ORDER — SENNOSIDES 8.6 MG/1
8.6 CAPSULE, GELATIN COATED ORAL 2 TIMES DAILY
Qty: 60 CAPSULE | Refills: 11 | Status: SHIPPED | OUTPATIENT
Start: 2023-10-16 | End: 2024-10-10

## 2023-10-16 RX ORDER — SODIUM CHLORIDE 0.9 % (FLUSH) 0.9 %
10 SYRINGE (ML) INJECTION EVERY 12 HOURS
Status: CANCELLED | OUTPATIENT
Start: 2023-10-16

## 2023-10-16 RX ORDER — SIMETHICONE 80 MG
80 TABLET,CHEWABLE ORAL EVERY 6 HOURS PRN
Qty: 30 TABLET | Refills: 0 | Status: SHIPPED | OUTPATIENT
Start: 2023-10-16 | End: 2023-10-26

## 2023-10-16 RX ORDER — SODIUM CHLORIDE 0.9 % (FLUSH) 0.9 %
10 SYRINGE (ML) INJECTION AS NEEDED
Status: CANCELLED | OUTPATIENT
Start: 2023-10-16

## 2023-10-16 NOTE — PATIENT INSTRUCTIONS
1-we need to schedule for a colonoscopy in few months.  For 1 week before your procedure please take MiraLAX twice a day to make sure that your prep is clean.  2-we need to send you for a swallow test  3-please continue taking omeprazole 40 mg daily half an hour before food  4-good job trying to quit smoking and using marijuana  5-we will check an ultrasound of your liver  6-for the bloating and bowel movements please start taking senna 1 tablet twice a day, Gas-X over-the-counter 3 or 4 times a day

## 2023-10-16 NOTE — PROGRESS NOTES
11/2021  66-year-old lady with history of exheavy alcohol use, hemorrhoidectomy asthma anxiety diabetes hypertension dyslipidemia sleep apnea submandibular sialoadenitis, thoracic spondylosis status post rhizotomy presenting for multiple GI complaints. Patient mentions a 30-year history of worsening solid and liquid dysphagia, started initially with solid food dysphagia, previous heartburn, with feeling of food stuck in her upper esophagus with intermittent choking and feeling of diet going to the wrong pipe. She also complains of chronic steatorrhea and loose stools with having 1-2 bowel movements per day. She denies any fever chills nausea vomiting melena hematochezia hematemesis weight loss.    8/4/2023  Patient is here for follow-up, did not have her prior EGD and colonoscopy because she was busy with her mom was sick and passed away in December 2022, she complains of worsening solid food dysphagia in the middle of her chest, daily nausea, admits to smoking and using marijuana last 66 pounds since 2021 mentions could be because she was under a lot of stress taking care of her mother    10/16/2023  Patient is here for follow-up, feeling well, felt better after colonoscopy but is currently feeling bloated again, heartburn is better, complains of intermittent dysphagia in the upper neck area, still smoking    EGD and dilation 15 years ago as per patient  EGD 8/2023 small hiatal hernia, grade a esophagitis, gastritis, biopsy of the esophagus stomach and duodenum unremarkable  Colonoscopy many years ago unknown result as per her  Colonoscopy 8/2023 [place] 8, left diverticulosis, hemorrhoids, sigmoid transverse and hepatic flexure TA, TA with 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, recommend repeat in 6 months after 1 week of twice daily MiraLAX, CF scope  Family history reviewed, not pertinent to chief complaint  Normally has 1-2 bowel movements per day, soft, back-to-back  Denies NSAIDs, ex  heavy alcohol use for 40y, denies IV drug use smoking, Positive marijuana use    1-will screen for hepatitis C    2-BMI 25 down from 33, stable over the last 6 months, mention she lost a lot of weight because she was taking care of her mom and had a lot of anxiety, currently stable and eating better    3- Healthcare maintenance, colonoscopy as above, repeat in 6 months after 1 week of twice daily MiraLAX, CF scope    4- chronic solid dysphagia , prior history of GERD, lifestyle changes advised, EGD as above, continue with omeprazole daily, lifestyle changes advised, check swallow eval    5-marijuana use, smoking, advised to avoid substance overuse, 3 minutes    6-prior alcohol abuse, LFTs unremarkable, check ultrasound abdomen     7-bloating and abdominal distention and incomplete bowel emptying, felt better after colonoscopy prep, start senna 1 tablet twice a day, Gas-X as needed

## 2023-10-17 PROBLEM — R25.1 TREMOR: Status: RESOLVED | Noted: 2023-02-28 | Resolved: 2023-10-17

## 2023-10-17 PROBLEM — R06.02 SHORTNESS OF BREATH ON EXERTION: Status: RESOLVED | Noted: 2023-08-20 | Resolved: 2023-10-17

## 2023-10-17 PROBLEM — F32.A DEPRESSION: Status: RESOLVED | Noted: 2023-02-28 | Resolved: 2023-10-17

## 2023-10-17 PROBLEM — M54.2 CERVICALGIA: Status: RESOLVED | Noted: 2019-05-18 | Resolved: 2023-10-17

## 2023-10-17 PROBLEM — E66.9 OBESITY: Status: RESOLVED | Noted: 2020-07-29 | Resolved: 2023-10-17

## 2023-10-17 PROBLEM — Z04.9 CONDITION NOT FOUND: Status: RESOLVED | Noted: 2023-08-20 | Resolved: 2023-10-17

## 2023-10-17 PROBLEM — R63.4 WEIGHT LOSS, ABNORMAL: Status: RESOLVED | Noted: 2023-08-20 | Resolved: 2023-10-17

## 2023-10-17 PROBLEM — E11.9 TYPE 2 DIABETES MELLITUS (MULTI): Status: RESOLVED | Noted: 2023-02-28 | Resolved: 2023-10-17

## 2023-10-17 PROBLEM — F41.9 ANXIETY: Status: RESOLVED | Noted: 2023-08-20 | Resolved: 2023-10-17

## 2023-10-17 PROBLEM — D72.829 ELEVATED WHITE BLOOD CELL COUNT: Status: RESOLVED | Noted: 2023-02-28 | Resolved: 2023-10-17

## 2023-10-17 PROBLEM — R63.0 DECREASED APPETITE: Status: RESOLVED | Noted: 2023-06-01 | Resolved: 2023-10-17

## 2023-10-17 PROBLEM — E66.09 EXOGENOUS OBESITY: Status: RESOLVED | Noted: 2023-03-06 | Resolved: 2023-10-17

## 2023-10-17 PROBLEM — R06.02 SOB (SHORTNESS OF BREATH): Status: RESOLVED | Noted: 2023-06-01 | Resolved: 2023-10-17

## 2023-10-17 PROBLEM — E78.5 DYSLIPIDEMIA: Status: RESOLVED | Noted: 2023-02-28 | Resolved: 2023-10-17

## 2023-10-17 PROBLEM — F41.9 ANXIETY AND DEPRESSION: Status: RESOLVED | Noted: 2023-02-28 | Resolved: 2023-10-17

## 2023-10-17 PROBLEM — F32.A ANXIETY AND DEPRESSION: Status: RESOLVED | Noted: 2023-02-28 | Resolved: 2023-10-17

## 2023-10-20 ENCOUNTER — TELEPHONE (OUTPATIENT)
Dept: GASTROENTEROLOGY | Facility: CLINIC | Age: 68
End: 2023-10-20
Payer: MEDICARE

## 2023-11-02 ASSESSMENT — ENCOUNTER SYMPTOMS
CONFUSION: 0
BRUISES/BLEEDS EASILY: 0
TROUBLE SWALLOWING: 0
COUGH: 0
ABDOMINAL PAIN: 0
APPETITE CHANGE: 0
FREQUENCY: 0
HEADACHES: 0
VOMITING: 0
POLYDIPSIA: 0
WOUND: 0
FATIGUE: 0
DIZZINESS: 0
UNEXPECTED WEIGHT CHANGE: 0
HALLUCINATIONS: 0
SORE THROAT: 0
SHORTNESS OF BREATH: 0
EYE DISCHARGE: 0
BLOOD IN STOOL: 0
ADENOPATHY: 0
EYE PAIN: 0
NECK PAIN: 0
DYSURIA: 0
EYE REDNESS: 0
BACK PAIN: 0
FEVER: 0
CHILLS: 0
PALPITATIONS: 0
HEMATURIA: 0
POLYPHAGIA: 0

## 2023-11-03 ENCOUNTER — OFFICE VISIT (OUTPATIENT)
Dept: PRIMARY CARE | Facility: CLINIC | Age: 68
End: 2023-11-03
Payer: MEDICARE

## 2023-11-03 ENCOUNTER — TELEPHONE (OUTPATIENT)
Dept: PRIMARY CARE | Facility: CLINIC | Age: 68
End: 2023-11-03

## 2023-11-03 VITALS
HEIGHT: 67 IN | BODY MASS INDEX: 25.24 KG/M2 | HEART RATE: 66 BPM | DIASTOLIC BLOOD PRESSURE: 78 MMHG | OXYGEN SATURATION: 94 % | TEMPERATURE: 97.1 F | SYSTOLIC BLOOD PRESSURE: 136 MMHG | WEIGHT: 160.8 LBS

## 2023-11-03 DIAGNOSIS — Z00.00 ROUTINE GENERAL MEDICAL EXAMINATION AT A HEALTH CARE FACILITY: Primary | ICD-10-CM

## 2023-11-03 DIAGNOSIS — F33.1 MODERATE EPISODE OF RECURRENT MAJOR DEPRESSIVE DISORDER (MULTI): ICD-10-CM

## 2023-11-03 PROBLEM — Z97.4 HEARING AID WORN: Status: ACTIVE | Noted: 2023-11-03

## 2023-11-03 PROCEDURE — 3077F SYST BP >= 140 MM HG: CPT | Performed by: NURSE PRACTITIONER

## 2023-11-03 PROCEDURE — 3008F BODY MASS INDEX DOCD: CPT | Performed by: NURSE PRACTITIONER

## 2023-11-03 PROCEDURE — G0439 PPPS, SUBSEQ VISIT: HCPCS | Performed by: NURSE PRACTITIONER

## 2023-11-03 PROCEDURE — 1159F MED LIST DOCD IN RCRD: CPT | Performed by: NURSE PRACTITIONER

## 2023-11-03 PROCEDURE — 3078F DIAST BP <80 MM HG: CPT | Performed by: NURSE PRACTITIONER

## 2023-11-03 PROCEDURE — 1170F FXNL STATUS ASSESSED: CPT | Performed by: NURSE PRACTITIONER

## 2023-11-03 PROCEDURE — 1125F AMNT PAIN NOTED PAIN PRSNT: CPT | Performed by: NURSE PRACTITIONER

## 2023-11-03 PROCEDURE — 3075F SYST BP GE 130 - 139MM HG: CPT | Performed by: NURSE PRACTITIONER

## 2023-11-03 PROCEDURE — 1160F RVW MEDS BY RX/DR IN RCRD: CPT | Performed by: NURSE PRACTITIONER

## 2023-11-03 PROCEDURE — G0444 DEPRESSION SCREEN ANNUAL: HCPCS | Performed by: NURSE PRACTITIONER

## 2023-11-03 RX ORDER — FLUTICASONE FUROATE, UMECLIDINIUM BROMIDE AND VILANTEROL TRIFENATATE 200; 62.5; 25 UG/1; UG/1; UG/1
1 POWDER RESPIRATORY (INHALATION) DAILY
COMMUNITY
End: 2024-05-28 | Stop reason: WASHOUT

## 2023-11-03 ASSESSMENT — ACTIVITIES OF DAILY LIVING (ADL)
GROCERY_SHOPPING: INDEPENDENT
BATHING: INDEPENDENT
MANAGING_FINANCES: INDEPENDENT
TAKING_MEDICATION: INDEPENDENT
DOING_HOUSEWORK: INDEPENDENT
DRESSING: INDEPENDENT

## 2023-11-03 NOTE — TELEPHONE ENCOUNTER
Patient informed.   
Patient would like the results of her sleep study and what she should do next.   
Who ordered the sleep study?  If it wasn't me then the pt needs to contact the provider that ordered it to find out the results and plan.  
Patient has no objection to blood transfusions.

## 2023-11-03 NOTE — PROGRESS NOTES
"Subjective   Patient ID: Sweta Lim is a 68 y.o. female who presents for Medicare Annual Wellness Visit Subsequent (Wants to discuss cpap. ).    This is a medicare wellness; pls ask medicare questions  colonoscopy 8/15/23; due 2/15/24 for next one  Bone density done 7/5/23    Is pt fasting? No   Does pt see any providers other than care team below:   anurag Sullivan/jules, elliot, tamiko, onelia, giovanni, ziggy, indu, prince, benito, franklin, jose    Did gi clear her for dental procedure no  Did cardiology clear her for dental procedure no  Did pulmonology clear her for dental procedure  no    Did pt set up mammogram appt?  She thinks   Did pt do 24hr urine? No   *Does pt want flu shot? Already got   *Does pt use a cpap? No wants to discuss  this   Last albuterol inh use? Unsure if its as needed or everyday   Bps at home- not taking; has monitor at home    Is pt on clonazepam 0.5 or 1mg Patient is very unsure on medicine.   Is pt on neurontin 300mg bid or tid  Is pt on levothyroxine 75 or 88mcg  Is pt on prednisone 2.5mg or 5mg    No care team member to display    HPI  Last labs-7/2023 tsh nl, A1c 5.9  6/2023 Sugar (aka glucose), kidney function, liver function and electrolytes in the CMP (comprehensive metabolic panel) were normal.  Hdl and trigs normal  Ldl high at 135. Goal <100. Decr fats (decrease portions of snacks and desserts) and incr fiber (increase veggies to at least 2 a day and at least 1 fruit a day).  CBC (complete blood count) was normal which looks at infection and anemia markers.  Due for labs- none    No results found for: \"CHOL\"  No results found for: \"TRIG\"  No results found for: \"HDL\"  No results found for: \"LDL\"  No results found for: \"TSH\"  No results found for: \"A1C\"  No components found for: \"POCA1C\"  No results found for: \"ALBUR\"  No components found for: \"POCALBUR\"      Other concerns: none      ER/urgicare visits in the last year- none  Hospitalizations in the last year- none    FH " ovarian, endometrial, cervical, uterine ca--none      last mammo- (40-75/90) due-print order      FH br ca-none    last colonoscopy/cologuard/FIT (45-75) 8/15/23; due 2/15/24 for next one  FH colon ca-none      last bone density-(age 65-85) or if fx after age 50) 7/5/23    lung ca screening (age 50-75 and 1 ppd x 20yrs and currently smoke or quit within 15yrs)-dr zarate  51 py      Exercise- dog walk  Diet-not hungry   Body mass index is 25.18 kg/m².      last dental appt- not cleared    BMs-regular to constipated  Sleep-able to fall asleep and stay asleep; no snoring or apnea  no cp, swelling, sob, abd pain, n/v/d/c, blood in stool or black stools  STI testing including hiv (age 15-65) and hep c screening (18-79)-declines      Immunization History   Administered Date(s) Administered    Flu vaccine (IIV4), preservative free *Check age/dose* 08/10/2018    Flu vaccine, quadrivalent, recombinant, preservative free, adult (FLUBLOK) 02/14/2020    Influenza, Unspecified 10/04/2013    Influenza, seasonal, injectable 09/30/2009, 10/02/2020, 09/15/2023    Moderna SARS-CoV-2 Vaccination 04/07/2021, 05/05/2021, 01/08/2022    Pneumococcal polysaccharide vaccine, 23-valent, age 2 years and older (PNEUMOVAX 23) 10/02/2020, 10/02/2022    Td vaccine, age 7 years and older (TDVAX) 11/10/2020    Tdap vaccine, age 7 year and older (BOOSTRIX) 01/21/2013, 01/24/2013       RSV vaccine-can get at a pharmacy    fractures in lifetime-wrist, lf leg  FH hip/spine/wrist/humerus fx in mom, dad or sibs-mom-hip    FH heart attack, heart surgery-none  FH stroke-none    The ASCVD Risk score (Nam DK, et al., 2019) failed to calculate for the following reasons:    Cannot find a previous HDL lab    Cannot find a previous total cholesterol lab  Coronary Artery Calcium score:  This test is recommended for men 45 or older and women 55 or older without a history of heart disease and have 1 risk factor (high LDL cholesterol, low HDL cholesterol, high  blood pressure, smoker (current or past), type 2 diabetes, IBD, lupus, RA, ankylosing spondylitis, psoriasis or family history of  heart disease <55yrs in dad, brother or child or <65yrs in mom, sister, or child.)       Review of Systems   Constitutional:  Negative for appetite change, chills, fatigue, fever and unexpected weight change.   HENT:  Negative for congestion, ear pain, sore throat and trouble swallowing.    Eyes:  Negative for pain, discharge and redness.   Respiratory:  Negative for cough and shortness of breath.    Cardiovascular:  Negative for chest pain and palpitations.   Gastrointestinal:  Negative for abdominal pain, blood in stool and vomiting.   Endocrine: Negative for polydipsia, polyphagia and polyuria.   Genitourinary:  Negative for dysuria, frequency, hematuria and urgency.   Musculoskeletal:  Negative for back pain and neck pain.   Skin:  Negative for rash and wound.   Allergic/Immunologic: Negative for immunocompromised state.   Neurological:  Negative for dizziness, syncope and headaches.   Hematological:  Negative for adenopathy. Does not bruise/bleed easily.   Psychiatric/Behavioral:  Negative for confusion and hallucinations.        Objective   Visit Vitals  /57   Pulse 66   Temp 36.2 °C (97.1 °F)      BP Readings from Last 3 Encounters:   11/03/23 146/57   10/16/23 112/68   08/07/23 133/80     Wt Readings from Last 3 Encounters:   11/03/23 72.9 kg (160 lb 12.8 oz)   10/16/23 74.4 kg (164 lb)   08/07/23 73.9 kg (163 lb)           Physical Exam  Constitutional:       General: She is not in acute distress.     Appearance: Normal appearance. She is not ill-appearing.   HENT:      Head: Normocephalic.      Right Ear: Tympanic membrane, ear canal and external ear normal.      Left Ear: Tympanic membrane, ear canal and external ear normal.      Nose: Nose normal.      Mouth/Throat:      Mouth: Mucous membranes are moist.      Pharynx: Oropharynx is clear.   Eyes:      Extraocular  Movements: Extraocular movements intact.      Conjunctiva/sclera: Conjunctivae normal.      Pupils: Pupils are equal, round, and reactive to light.   Cardiovascular:      Rate and Rhythm: Normal rate and regular rhythm.      Heart sounds: Normal heart sounds. No murmur heard.  Pulmonary:      Effort: Pulmonary effort is normal. No respiratory distress.      Breath sounds: Normal breath sounds. No wheezing, rhonchi or rales.   Abdominal:      General: Bowel sounds are normal.      Palpations: Abdomen is soft. There is no mass.      Tenderness: There is no abdominal tenderness.   Musculoskeletal:         General: No swelling or tenderness. Normal range of motion.      Cervical back: Normal range of motion and neck supple.      Right lower leg: No edema.      Left lower leg: No edema.   Skin:     General: Skin is warm.      Findings: No rash.   Neurological:      General: No focal deficit present.      Mental Status: She is alert and oriented to person, place, and time.      Cranial Nerves: No cranial nerve deficit.      Motor: No weakness.   Psychiatric:         Mood and Affect: Mood normal.         Behavior: Behavior normal.       Assessment/Plan   Diagnoses and all orders for this visit:  Routine general medical examination at a health care facility  BMI 25.0-25.9,adult  Moderate episode of recurrent major depressive disorder (CMS/[facility])  -     Referral to Psychology; Future  Other orders  -     Follow Up In Primary Care - Medicare Annual; Future

## 2023-11-03 NOTE — PATIENT INSTRUCTIONS
due 2/15/24 for next colonoscopy with lucy morrissey  [address]  [address]  [place] [address]  [phone]    Set up psychologist/therapist appt    Set up mammogram appt  at [phone]    Do 24hr urine and drop it off at the lab in this building  Pee in the toilet the first urine  Then 2nd urine all the way to the first urine of the next morning  Keep in refrigerator while doing the test and up til you bring it in    Call with names of meds    Trelegy is once a day no matter how you feel  Albuterol inhaler is for sob, wheezing, tight chest, coughing as needed        Handouts given to pt:  physical handout    stop smoking        I recommend signing up for MyChart.    Labs:    You can use the lab in our building when fasting. The hrs are: Monday-Friday, 7 a.m. - 5 p.m., Saturday 8 a.m. - 12 noon.   No appt needed, BUT YOU DO NEED THE PAPER ORDER.    Fasting is no food, drink, gum or mints other than water for 12 hrs.   Results will be back in 2-3 business days for most labs. It is always recommended for any orders (labs, xrays, ultrasounds,MRI, ct scan, procedures etc) to check with your insurance provider for expected costs or expenses to you.     Screenings:    To set up mammogram, call [phone]    You will get your results via phone from my medical assistant if you do not have MyChart.  OR  You will get your results via MyChart    If a result is urgent, I will call to speak to you.    Vaccines:    ---- Tdap-at a pharmacy    ---- Shingrix-at a pharmacy    ---- RSV vaccine-at a pharmacy        General recommendations:  Exercise-cardio 4-5d/wk 30min each day  Diet-Breakfast-toast (my favorite Jane Vasquez Delighful Multigrain or Sam's Killer Bread Good Seed thin-sliced)/bagel/English muffin-whole wheat flour as a 1st ingredient or cereal/oatmeal/granola bar-fiber 4g or more or protein like eggs or peanut butter; optional veggies  Lunch-protein, 1/2c carb or 2 slices bread, veg 1c  Dinner-protein, fist sized  carb, veg 1c  Fruit 2 a day  Dairy 2 a day-milk, soy milk, almond milk, cheese, yogurt, cottage cheese  Snacks-Protein-hard boiled egg, nuts (walnuts/almonds/pecans/pistachios 1/4c), hummus, beef/deer jerky or meat sticks; vegetable, fruit, dairy-milk(1%, skim, almond, soy)/cheese (not a lot of cheddar)/yogurt (Greek is best-my favorite Dannon Fruit on the Bottom Greek)/cottage cheese 2%; triscuits/ popcorn/wheat thins have a lot of fiber; follow serving size on bag/box/container  increase water  Limit alcohol to 1 drink per day for women and 2 drinks per day for men (1 drink=12oz beer or 5oz wine or 1 1/2oz liquor)  Calcium: 500mg 1 twice a day if age 50 and younger and 600mg 1 twice a day if over age 50 (calcium citrate can be taken without food)  Vitamin D: 800-5000 IU/day  Limit salt to <2300mg a day if age 50 and under and <1500mg a day if over age 50/have high bp or diabetes or kidney disease  Recommend folate for childbearing age women 0.4mg per day (can be found in a multivitamin)  Recommend 18mg/dL of iron a day if age 50 and under and 8mg/dL a day if over age 50; take on an empty stomach at bedtime  Use sunscreen   Wear seatbelt  Recommend safe sex practices: using condoms everytime you have sex, discuss with a new partner about their past partners/history of STDs/drug use, avoid drinking alcohol or using drugs as this increases the chance that you will participate in high-risk sex, for oral sex help protect your mouth by having your partner use a condom (male or female), women should not douche after sex, be aware of your partner's body and your body-look for signs of a sore, blister, rash, or discharge, and have regular exams and periodic tests for STDs.  No distracted driving  No driving when under influence of substances  Wear a seatbelt  Eye dr every 1-2yrs  Dentist every 6-12 mon  Tetanus shot every 10yrs  Recommend flu vaccine in the fall  Appt in  1 year for physical      I will communicate with  you via nxtControl regarding messages and results. If you need help with this, you can call the support line at [phone].    IT WAS A PLEASURE TO SEE YOU TODAY. THANK YOU FOR CHOOSING US FOR YOUR HEALTHCARE NEEDS.

## 2023-11-21 PROBLEM — E78.00 HYPERCHOLESTEROLEMIA: Status: RESOLVED | Noted: 2021-09-29 | Resolved: 2023-11-21

## 2023-11-27 ENCOUNTER — HOSPITAL ENCOUNTER (OUTPATIENT)
Dept: VASCULAR MEDICINE | Facility: CLINIC | Age: 68
Discharge: HOME | End: 2023-11-27
Payer: MEDICARE

## 2023-11-27 ENCOUNTER — OFFICE VISIT (OUTPATIENT)
Dept: CARDIOLOGY | Facility: CLINIC | Age: 68
End: 2023-11-27
Payer: MEDICARE

## 2023-11-27 VITALS
SYSTOLIC BLOOD PRESSURE: 126 MMHG | DIASTOLIC BLOOD PRESSURE: 68 MMHG | OXYGEN SATURATION: 93 % | BODY MASS INDEX: 24.71 KG/M2 | WEIGHT: 157.8 LBS | HEART RATE: 60 BPM

## 2023-11-27 DIAGNOSIS — I10 BENIGN ESSENTIAL HYPERTENSION: ICD-10-CM

## 2023-11-27 DIAGNOSIS — I35.9 AORTIC VALVE DISEASE: ICD-10-CM

## 2023-11-27 DIAGNOSIS — I77.9 CAROTID ARTERY DISEASE, UNSPECIFIED LATERALITY, UNSPECIFIED TYPE (CMS-HCC): Primary | ICD-10-CM

## 2023-11-27 DIAGNOSIS — I65.23 OCCLUSION AND STENOSIS OF BILATERAL CAROTID ARTERIES: ICD-10-CM

## 2023-11-27 DIAGNOSIS — I77.9 DISORDER OF ARTERIES AND ARTERIOLES, UNSPECIFIED (CMS-HCC): ICD-10-CM

## 2023-11-27 PROCEDURE — 3008F BODY MASS INDEX DOCD: CPT | Performed by: NURSE PRACTITIONER

## 2023-11-27 PROCEDURE — 3074F SYST BP LT 130 MM HG: CPT | Performed by: NURSE PRACTITIONER

## 2023-11-27 PROCEDURE — 1125F AMNT PAIN NOTED PAIN PRSNT: CPT | Performed by: NURSE PRACTITIONER

## 2023-11-27 PROCEDURE — 99213 OFFICE O/P EST LOW 20 MIN: CPT | Performed by: NURSE PRACTITIONER

## 2023-11-27 PROCEDURE — 1160F RVW MEDS BY RX/DR IN RCRD: CPT | Performed by: NURSE PRACTITIONER

## 2023-11-27 PROCEDURE — 93880 EXTRACRANIAL BILAT STUDY: CPT | Performed by: INTERNAL MEDICINE

## 2023-11-27 PROCEDURE — 1159F MED LIST DOCD IN RCRD: CPT | Performed by: NURSE PRACTITIONER

## 2023-11-27 PROCEDURE — 3078F DIAST BP <80 MM HG: CPT | Performed by: NURSE PRACTITIONER

## 2023-11-27 PROCEDURE — 93880 EXTRACRANIAL BILAT STUDY: CPT

## 2023-11-27 RX ORDER — NAPROXEN SODIUM 220 MG/1
81 TABLET, FILM COATED ORAL DAILY
Qty: 30 TABLET | Refills: 11 | Status: SHIPPED | OUTPATIENT
Start: 2023-11-27 | End: 2024-04-16 | Stop reason: SDUPTHER

## 2023-11-27 ASSESSMENT — ENCOUNTER SYMPTOMS
ALLERGIC/IMMUNOLOGIC NEGATIVE: 1
FATIGUE: 1
PALPITATIONS: 0
HEMATOLOGIC/LYMPHATIC NEGATIVE: 1
NEUROLOGICAL NEGATIVE: 1
APPETITE CHANGE: 1
UNEXPECTED WEIGHT CHANGE: 1
GASTROINTESTINAL NEGATIVE: 1
NECK PAIN: 1
SHORTNESS OF BREATH: 1
COUGH: 1
EYES NEGATIVE: 1
PSYCHIATRIC NEGATIVE: 1

## 2023-11-27 NOTE — PROGRESS NOTES
Ms Lim is a 67 year old female with carotid artery disease, essential tremors, asthma, hypertension, T2DM, depression with anxiety, hyperlipidemia, DEBRA, RA, spinal stenosis., here for a follow up for of her aortic stenosis and carotid artery disease.  She denies any weakness, unilateral weakness, dizziness, syncope, chest pain, or lower extremity edema.  She is followed by pulmonology for complaints of continued shortness of breath.  She also depressed with the anniversary of her mothers death on December 7, 2023.     She continues to smoke and is followed by our smoking cessation clinic        Social HX  Social History     Tobacco Use    Smoking status: Every Day     Packs/day: 1     Types: Cigarettes     Start date: 1972    Smokeless tobacco: Never   Substance Use Topics    Alcohol use: Never    Drug use: Never          Family HX  Family History   Problem Relation Name Age of Onset    Hip fracture Mother      Cancer Father      Diabetes Paternal Grandmother      Hypertension Other Grandparent     Diabetes Other            Review Of Systems   Review of Systems   Constitutional:  Positive for appetite change, fatigue and unexpected weight change.   Eyes: Negative.    Respiratory:  Positive for cough and shortness of breath.    Cardiovascular:  Negative for chest pain, palpitations and leg swelling.   Gastrointestinal: Negative.    Musculoskeletal:  Positive for neck pain.   Allergic/Immunologic: Negative.    Neurological: Negative.    Hematological: Negative.    Psychiatric/Behavioral: Negative.            Allergies  Allergies   Allergen Reactions    Codeine Nausea Only          Vitals  There were no vitals taken for this visit.        Physical Exam  Physical Exam  Constitutional:       Appearance: Normal appearance. She is normal weight.   Neck:      Vascular: No carotid bruit.   Cardiovascular:      Rate and Rhythm: Normal rate and regular rhythm.      Heart sounds: Normal heart sounds.   Pulmonary:       "Effort: Pulmonary effort is normal. No respiratory distress.      Breath sounds: Normal breath sounds. No wheezing or rales.   Abdominal:      Palpations: Abdomen is soft.   Musculoskeletal:         General: Normal range of motion.      Cervical back: Tenderness present.   Skin:     General: Skin is warm and dry.      Capillary Refill: Capillary refill takes less than 2 seconds.      Coloration: Skin is pale.   Neurological:      Mental Status: She is alert.      Comments: +essential tremors          Current/Home Meds    Current Outpatient Medications:     albuterol 90 mcg/actuation inhaler, Inhale 2 puffs 1-2 minutes apart every 4 hours as needed for cough, wheezing, shortness of breath, or prior to exercise., Disp: , Rfl:     alendronate (Fosamax) 70 mg tablet, Take 1 tablet (70 mg) by mouth every 7 days. BEFORE FIRST FOOD, DRINK OR MEDICINE OF THE DAY. DISSOLVE IN 4OZ OF WATER, Disp: , Rfl:     BD Stefanie 2nd Gen Pen Needle 32 gauge x 5/32\" needle, 1 Needle once daily., Disp: , Rfl:     busPIRone (Buspar) 10 mg tablet, 2 tablets twice a day, Disp: , Rfl:     clonazePAM (KlonoPIN) 1 mg tablet, Take 1 tablet (1 mg) by mouth 2 times a day., Disp: , Rfl:     colchicine 0.6 mg tablet, Take 1 tablet (0.6 mg) by mouth once daily., Disp: , Rfl:     ezetimibe (Zetia) 10 mg tablet, Take 1 tablet (10 mg) by mouth once daily., Disp: , Rfl:     fluticasone-umeclidin-vilanter (Trelegy Ellipta) 200-62.5-25 mcg blister with device, Inhale 1 puff once daily., Disp: , Rfl:     folic acid (Folvite) 1 mg tablet, Take 1 tablet (1 mg) by mouth once daily., Disp: , Rfl:     FreeStyle Lancets 28 gauge, 1 each once daily., Disp: , Rfl:     FreeStyle Test strip, Check glucose twice daily. May dispense formulary equivalent. Dx: IDDM E11.9, Disp: , Rfl:     gabapentin (Neurontin) 300 mg capsule, Take 1 capsule (300 mg) by mouth 2 times a day., Disp: , Rfl:     insulin degludec (Tresiba FlexTouch) 100 unit/mL (3 mL) injection, Inject 30 units " SC once daily at bedtime. Adjust by 2 units every 4 days to achieve fasting glucose < 150, and glucose never lower than 100., Disp: , Rfl:     levothyroxine (Synthroid, Levoxyl) 75 mcg tablet, Take 1 tablet (75 mcg) by mouth once daily., Disp: , Rfl:     metFORMIN XR (Glucophage-XR) 500 mg 24 hr tablet, Take 3 tablets (1,500 mg) by mouth once daily., Disp: , Rfl:     methocarbamol (Robaxin) 750 mg tablet, Take 1 tablet (750 mg) by mouth every 4 hours., Disp: , Rfl:     methotrexate (Trexall) 2.5 mg tablet, TAKE 6 TABLET Weekly, Disp: , Rfl:     nicotine polacrilex (Commit) 4 mg lozenge, Dissolve 1 lozenge (4 mg) in the mouth every 2 hours if needed for smoking cessation., Disp: 100 lozenge, Rfl: 0    omeprazole (PriLOSEC) 40 mg DR capsule, Take 1 capsule (40 mg) by mouth once daily in the morning. Take before meals., Disp: 30 capsule, Rfl: 11    polyethylene glycol (Glycolax, Miralax) 17 gram/dose powder, Take 17 g by mouth twice a day. IN 8 OUNCES OF WATER AND DRINK, Disp: , Rfl:     primidone (Mysoline) 50 mg tablet, Take 2 tablets (100 mg) by mouth 2 times a day., Disp: , Rfl:     sennosides (senna) 8.6 mg capsule, Take 1 capsule (8.6 mg) by mouth 2 times a day., Disp: 60 capsule, Rfl: 11    simvastatin (Zocor) 40 mg tablet, Take 1 tablet (40 mg) by mouth once daily., Disp: 90 tablet, Rfl: 1    traZODone (Desyrel) 50 mg tablet, Take 1-2 tablets ( mg) by mouth once daily at bedtime., Disp: , Rfl:     trihexyphenidyl (Artane) 2 mg tablet, Take 1 tablet (2 mg) by mouth 3 times a day., Disp: , Rfl:     Current Facility-Administered Medications:     nicotine (Nicoderm CQ) 21 mg/24 hr patch 1 patch, 1 patch, transdermal, Once, Nelson rBown DO       Cardiac Service Results:  5/10/23 CAROTID DUPLEX SCAN  Right            Left  PSV   EDV        PSV   EDV  70 cm/s      CCA P  91 cm/s  81 cm/s      CCA D  70 cm/s  194 cm/s 56 cm/s  ICA P  96 cm/s 31 cm/s  179 cm/s 48 cm/s  ICA M  111 cm/s 42 cm/s  74 cm/s 23 cm/s   ICA D  105 cm/s 34 cm/s  171 cm/s      ECA   283 cm/s  58 cm/s 13 cm/s Vertebral 64 cm/s 17 cm/s  137 cm/s     Subclavian 145 cm/s     Right Left  ICA/CCA Ratio 2.4 1.4       5/10/23 ECHO  1. Left ventricular systolic function is normal with a 55-60% estimated ejection fraction.  2. Aortic valve sclerosis.  Assessment/Plan      Carotid artery stenosis:  Preliminary carotid duplex scan shows a peak systolic velocity of 195 and an end diastolic velocity of 74 with a ratio of 2.6 on the right.  She is to continue asa, statin and I have highly suggested she continue with smoking cessation clinic with a goal of quitting of January 1, 2024.  She does have nicotine patches and I have instructed her on how to apply these.  She seems motivated to quit.    Aortic stenosis:  Will need an echo in 2024 to monitor aortic valve    BP is well controlled at 126/68.  I would like her to consume a low sodium, low saturated fat diet.  Follow up in 6 months with a repeat carotid duplex scan.

## 2023-11-27 NOTE — H&P (VIEW-ONLY)
Ms Lim is a 67 year old female with carotid artery disease, essential tremors, asthma, hypertension, T2DM, depression with anxiety, hyperlipidemia, DEBRA, RA, spinal stenosis., here for a follow up for of her aortic stenosis and carotid artery disease.  She denies any weakness, unilateral weakness, dizziness, syncope, chest pain, or lower extremity edema.  She is followed by pulmonology for complaints of continued shortness of breath.  She also depressed with the anniversary of her mothers death on December 7, 2023.     She continues to smoke and is followed by our smoking cessation clinic        Social HX  Social History     Tobacco Use    Smoking status: Every Day     Packs/day: 1     Types: Cigarettes     Start date: 1972    Smokeless tobacco: Never   Substance Use Topics    Alcohol use: Never    Drug use: Never          Family HX  Family History   Problem Relation Name Age of Onset    Hip fracture Mother      Cancer Father      Diabetes Paternal Grandmother      Hypertension Other Grandparent     Diabetes Other            Review Of Systems   Review of Systems   Constitutional:  Positive for appetite change, fatigue and unexpected weight change.   Eyes: Negative.    Respiratory:  Positive for cough and shortness of breath.    Cardiovascular:  Negative for chest pain, palpitations and leg swelling.   Gastrointestinal: Negative.    Musculoskeletal:  Positive for neck pain.   Allergic/Immunologic: Negative.    Neurological: Negative.    Hematological: Negative.    Psychiatric/Behavioral: Negative.            Allergies  Allergies   Allergen Reactions    Codeine Nausea Only          Vitals  There were no vitals taken for this visit.        Physical Exam  Physical Exam  Constitutional:       Appearance: Normal appearance. She is normal weight.   Neck:      Vascular: No carotid bruit.   Cardiovascular:      Rate and Rhythm: Normal rate and regular rhythm.      Heart sounds: Normal heart sounds.   Pulmonary:       "Effort: Pulmonary effort is normal. No respiratory distress.      Breath sounds: Normal breath sounds. No wheezing or rales.   Abdominal:      Palpations: Abdomen is soft.   Musculoskeletal:         General: Normal range of motion.      Cervical back: Tenderness present.   Skin:     General: Skin is warm and dry.      Capillary Refill: Capillary refill takes less than 2 seconds.      Coloration: Skin is pale.   Neurological:      Mental Status: She is alert.      Comments: +essential tremors          Current/Home Meds    Current Outpatient Medications:     albuterol 90 mcg/actuation inhaler, Inhale 2 puffs 1-2 minutes apart every 4 hours as needed for cough, wheezing, shortness of breath, or prior to exercise., Disp: , Rfl:     alendronate (Fosamax) 70 mg tablet, Take 1 tablet (70 mg) by mouth every 7 days. BEFORE FIRST FOOD, DRINK OR MEDICINE OF THE DAY. DISSOLVE IN 4OZ OF WATER, Disp: , Rfl:     BD Stefanie 2nd Gen Pen Needle 32 gauge x 5/32\" needle, 1 Needle once daily., Disp: , Rfl:     busPIRone (Buspar) 10 mg tablet, 2 tablets twice a day, Disp: , Rfl:     clonazePAM (KlonoPIN) 1 mg tablet, Take 1 tablet (1 mg) by mouth 2 times a day., Disp: , Rfl:     colchicine 0.6 mg tablet, Take 1 tablet (0.6 mg) by mouth once daily., Disp: , Rfl:     ezetimibe (Zetia) 10 mg tablet, Take 1 tablet (10 mg) by mouth once daily., Disp: , Rfl:     fluticasone-umeclidin-vilanter (Trelegy Ellipta) 200-62.5-25 mcg blister with device, Inhale 1 puff once daily., Disp: , Rfl:     folic acid (Folvite) 1 mg tablet, Take 1 tablet (1 mg) by mouth once daily., Disp: , Rfl:     FreeStyle Lancets 28 gauge, 1 each once daily., Disp: , Rfl:     FreeStyle Test strip, Check glucose twice daily. May dispense formulary equivalent. Dx: IDDM E11.9, Disp: , Rfl:     gabapentin (Neurontin) 300 mg capsule, Take 1 capsule (300 mg) by mouth 2 times a day., Disp: , Rfl:     insulin degludec (Tresiba FlexTouch) 100 unit/mL (3 mL) injection, Inject 30 units " SC once daily at bedtime. Adjust by 2 units every 4 days to achieve fasting glucose < 150, and glucose never lower than 100., Disp: , Rfl:     levothyroxine (Synthroid, Levoxyl) 75 mcg tablet, Take 1 tablet (75 mcg) by mouth once daily., Disp: , Rfl:     metFORMIN XR (Glucophage-XR) 500 mg 24 hr tablet, Take 3 tablets (1,500 mg) by mouth once daily., Disp: , Rfl:     methocarbamol (Robaxin) 750 mg tablet, Take 1 tablet (750 mg) by mouth every 4 hours., Disp: , Rfl:     methotrexate (Trexall) 2.5 mg tablet, TAKE 6 TABLET Weekly, Disp: , Rfl:     nicotine polacrilex (Commit) 4 mg lozenge, Dissolve 1 lozenge (4 mg) in the mouth every 2 hours if needed for smoking cessation., Disp: 100 lozenge, Rfl: 0    omeprazole (PriLOSEC) 40 mg DR capsule, Take 1 capsule (40 mg) by mouth once daily in the morning. Take before meals., Disp: 30 capsule, Rfl: 11    polyethylene glycol (Glycolax, Miralax) 17 gram/dose powder, Take 17 g by mouth twice a day. IN 8 OUNCES OF WATER AND DRINK, Disp: , Rfl:     primidone (Mysoline) 50 mg tablet, Take 2 tablets (100 mg) by mouth 2 times a day., Disp: , Rfl:     sennosides (senna) 8.6 mg capsule, Take 1 capsule (8.6 mg) by mouth 2 times a day., Disp: 60 capsule, Rfl: 11    simvastatin (Zocor) 40 mg tablet, Take 1 tablet (40 mg) by mouth once daily., Disp: 90 tablet, Rfl: 1    traZODone (Desyrel) 50 mg tablet, Take 1-2 tablets ( mg) by mouth once daily at bedtime., Disp: , Rfl:     trihexyphenidyl (Artane) 2 mg tablet, Take 1 tablet (2 mg) by mouth 3 times a day., Disp: , Rfl:     Current Facility-Administered Medications:     nicotine (Nicoderm CQ) 21 mg/24 hr patch 1 patch, 1 patch, transdermal, Once, Nelson Brown DO       Cardiac Service Results:  5/10/23 CAROTID DUPLEX SCAN  Right            Left  PSV   EDV        PSV   EDV  70 cm/s      CCA P  91 cm/s  81 cm/s      CCA D  70 cm/s  194 cm/s 56 cm/s  ICA P  96 cm/s 31 cm/s  179 cm/s 48 cm/s  ICA M  111 cm/s 42 cm/s  74 cm/s 23 cm/s   ICA D  105 cm/s 34 cm/s  171 cm/s      ECA   283 cm/s  58 cm/s 13 cm/s Vertebral 64 cm/s 17 cm/s  137 cm/s     Subclavian 145 cm/s     Right Left  ICA/CCA Ratio 2.4 1.4       5/10/23 ECHO  1. Left ventricular systolic function is normal with a 55-60% estimated ejection fraction.  2. Aortic valve sclerosis.  Assessment/Plan      Carotid artery stenosis:  Preliminary carotid duplex scan shows a peak systolic velocity of 195 and an end diastolic velocity of 74 with a ratio of 2.6 on the right.  She is to continue asa, statin and I have highly suggested she continue with smoking cessation clinic with a goal of quitting of January 1, 2024.  She does have nicotine patches and I have instructed her on how to apply these.  She seems motivated to quit.    Aortic stenosis:  Will need an echo in 2024 to monitor aortic valve    BP is well controlled at 126/68.  I would like her to consume a low sodium, low saturated fat diet.  Follow up in 6 months with a repeat carotid duplex scan.

## 2023-11-29 DIAGNOSIS — F41.1 GENERALIZED ANXIETY DISORDER: ICD-10-CM

## 2023-11-30 ENCOUNTER — ANESTHESIA EVENT (OUTPATIENT)
Dept: GASTROENTEROLOGY | Facility: HOSPITAL | Age: 68
End: 2023-11-30
Payer: MEDICARE

## 2023-11-30 ENCOUNTER — HOSPITAL ENCOUNTER (OUTPATIENT)
Dept: GASTROENTEROLOGY | Facility: HOSPITAL | Age: 68
Discharge: HOME | End: 2023-11-30
Payer: MEDICARE

## 2023-11-30 ENCOUNTER — ANESTHESIA (OUTPATIENT)
Dept: GASTROENTEROLOGY | Facility: HOSPITAL | Age: 68
End: 2023-11-30
Payer: MEDICARE

## 2023-11-30 VITALS
DIASTOLIC BLOOD PRESSURE: 72 MMHG | TEMPERATURE: 97.3 F | BODY MASS INDEX: 24.74 KG/M2 | RESPIRATION RATE: 18 BRPM | HEART RATE: 63 BPM | OXYGEN SATURATION: 100 % | SYSTOLIC BLOOD PRESSURE: 142 MMHG | WEIGHT: 157.63 LBS | HEIGHT: 67 IN

## 2023-11-30 DIAGNOSIS — Z12.11 SCREEN FOR COLON CANCER: ICD-10-CM

## 2023-11-30 PROBLEM — Z98.890 HISTORY OF GENERAL ANESTHESIA: Status: ACTIVE | Noted: 2023-11-30

## 2023-11-30 LAB — GLUCOSE BLD MANUAL STRIP-MCNC: 133 MG/DL (ref 74–99)

## 2023-11-30 PROCEDURE — 45385 COLONOSCOPY W/LESION REMOVAL: CPT | Performed by: STUDENT IN AN ORGANIZED HEALTH CARE EDUCATION/TRAINING PROGRAM

## 2023-11-30 PROCEDURE — A45378 PR COLONOSCOPY,DIAGNOSTIC: Performed by: NURSE ANESTHETIST, CERTIFIED REGISTERED

## 2023-11-30 PROCEDURE — 7100000009 HC PHASE TWO TIME - INITIAL BASE CHARGE

## 2023-11-30 PROCEDURE — 3700000001 HC GENERAL ANESTHESIA TIME - INITIAL BASE CHARGE

## 2023-11-30 PROCEDURE — A45378 PR COLONOSCOPY,DIAGNOSTIC: Performed by: ANESTHESIOLOGY

## 2023-11-30 PROCEDURE — 0753T DGTZ GLS MCRSCP SLD LEVEL IV: CPT | Mod: TC,SUR,PARLAB | Performed by: STUDENT IN AN ORGANIZED HEALTH CARE EDUCATION/TRAINING PROGRAM

## 2023-11-30 PROCEDURE — 3700000002 HC GENERAL ANESTHESIA TIME - EACH INCREMENTAL 1 MINUTE

## 2023-11-30 PROCEDURE — 7100000010 HC PHASE TWO TIME - EACH INCREMENTAL 1 MINUTE

## 2023-11-30 PROCEDURE — 82947 ASSAY GLUCOSE BLOOD QUANT: CPT

## 2023-11-30 PROCEDURE — 2500000004 HC RX 250 GENERAL PHARMACY W/ HCPCS (ALT 636 FOR OP/ED): Performed by: NURSE ANESTHETIST, CERTIFIED REGISTERED

## 2023-11-30 PROCEDURE — 88305 TISSUE EXAM BY PATHOLOGIST: CPT | Performed by: PATHOLOGY

## 2023-11-30 RX ORDER — SODIUM CHLORIDE 0.9 % (FLUSH) 0.9 %
10 SYRINGE (ML) INJECTION EVERY 12 HOURS
Status: CANCELLED | OUTPATIENT
Start: 2023-11-30

## 2023-11-30 RX ORDER — SODIUM CHLORIDE 0.9 % (FLUSH) 0.9 %
10 SYRINGE (ML) INJECTION AS NEEDED
Status: CANCELLED | OUTPATIENT
Start: 2023-11-30

## 2023-11-30 RX ORDER — PROPOFOL 10 MG/ML
INJECTION, EMULSION INTRAVENOUS CONTINUOUS PRN
Status: DISCONTINUED | OUTPATIENT
Start: 2023-11-30 | End: 2023-11-30

## 2023-11-30 RX ORDER — SODIUM CHLORIDE, SODIUM LACTATE, POTASSIUM CHLORIDE, CALCIUM CHLORIDE 600; 310; 30; 20 MG/100ML; MG/100ML; MG/100ML; MG/100ML
20 INJECTION, SOLUTION INTRAVENOUS CONTINUOUS
Status: CANCELLED | OUTPATIENT
Start: 2023-11-30

## 2023-11-30 RX ORDER — PROPOFOL 10 MG/ML
INJECTION, EMULSION INTRAVENOUS AS NEEDED
Status: DISCONTINUED | OUTPATIENT
Start: 2023-11-30 | End: 2023-11-30

## 2023-11-30 RX ADMIN — PROPOFOL 75 MG: 10 INJECTION, EMULSION INTRAVENOUS at 14:27

## 2023-11-30 RX ADMIN — PROPOFOL 150 MCG/KG/MIN: 10 INJECTION, EMULSION INTRAVENOUS at 14:28

## 2023-11-30 RX ADMIN — SODIUM CHLORIDE, SODIUM LACTATE, POTASSIUM CHLORIDE, AND CALCIUM CHLORIDE: .6; .31; .03; .02 INJECTION, SOLUTION INTRAVENOUS at 14:24

## 2023-11-30 RX ADMIN — PROPOFOL 30 MG: 10 INJECTION, EMULSION INTRAVENOUS at 14:29

## 2023-11-30 SDOH — HEALTH STABILITY: MENTAL HEALTH: CURRENT SMOKER: 1

## 2023-11-30 ASSESSMENT — PAIN SCALES - GENERAL
PAINLEVEL_OUTOF10: 0 - NO PAIN
PAIN_LEVEL: 0
PAINLEVEL_OUTOF10: 0 - NO PAIN

## 2023-11-30 ASSESSMENT — PAIN - FUNCTIONAL ASSESSMENT
PAIN_FUNCTIONAL_ASSESSMENT: 0-10

## 2023-11-30 NOTE — ANESTHESIA PREPROCEDURE EVALUATION
Patient: Sweta Lim    Procedure Information       Date/Time: 11/30/23 1440    Scheduled providers: Raza Sullivan MD    Procedure: COLONOSCOPY    Location: [facility]            Relevant Problems   Anesthesia   (+) History of general anesthesia      Cardiovascular   (+) Aortic valve disease   (+) Atherosclerosis of both carotid arteries   (+) Benign essential hypertension   (+) Carotid artery disease, unspecified laterality, unspecified type (CMS/HCC)   (+) Heart murmur   (+) Mixed hyperlipidemia      Endocrine   (+) DM (diabetes mellitus), type 2 (CMS/HCC)   (+) Diabetic polyneuropathy associated with type 2 diabetes mellitus (CMS/HCC)      GI   (+) Chronic diarrhea      Neuro/Psych   (+) Atherosclerosis of both carotid arteries   (+) Carotid artery disease, unspecified laterality, unspecified type (CMS/HCC)   (+) Cervical radiculopathy   (+) Diabetic polyneuropathy associated with type 2 diabetes mellitus (CMS/HCC)   (+) Generalized anxiety disorder   (+) Moderate episode of recurrent major depressive disorder (CMS/HCC)   (+) Situational depression   (+) Stress reaction      Pulmonary   (+) Asthma   (+) Obstructive sleep apnea syndrome      Musculoskeletal   (+) Cervical spondylosis with myelopathy   (+) Cervical stenosis of spine   (+) Rheumatoid arthritis (CMS/HCC)   (+) Spinal stenosis of cervical region   (+) Spondylosis of thoracic spine      Infectious Disease   (+) Post-traumatic wound infection      Other   (+) Rheumatoid arthritis (CMS/HCC)       Clinical information reviewed:                   NPO Detail:  No data recorded     Physical Exam    Airway  Mallampati: I  TM distance: >3 FB  Neck ROM: full     Cardiovascular - normal exam     Dental        Pulmonary   (+) decreased breath sounds     Abdominal - normal exam             Anesthesia Plan    ASA 3     MAC     The patient is a current smoker.  Patient was previously instructed to abstain from smoking on day of procedure.  Patient  smoked on day of procedure.    intravenous induction   Anesthetic plan and risks discussed with patient.  Use of blood products discussed with patient who consented to blood products.

## 2023-11-30 NOTE — DISCHARGE INSTRUCTIONS

## 2023-11-30 NOTE — ANESTHESIA POSTPROCEDURE EVALUATION
Patient: Sweta Lim    Procedure Summary       Date: 11/30/23 Room / Location: [facility]    Anesthesia Start: 1424 Anesthesia Stop: 1507    Procedure: COLONOSCOPY Diagnosis: Screen for colon cancer    Scheduled Providers: Raza Sullivan MD Responsible Provider: Lawrence Casillas MD    Anesthesia Type: MAC ASA Status: 3            Anesthesia Type: MAC    Vitals Value Taken Time   /60 11/30/23 1507   Temp 36.3 11/30/23 1507   Pulse 58 11/30/23 1507   Resp 14 11/30/23 1507   SpO2 97 11/30/23 1507       Anesthesia Post Evaluation    Patient location during evaluation: PACU  Patient participation: complete - patient participated  Level of consciousness: awake and alert  Pain score: 0  Pain management: adequate  Airway patency: patent  Cardiovascular status: acceptable and blood pressure returned to baseline  Respiratory status: acceptable  Hydration status: acceptable  Postoperative Nausea and Vomiting: none        No notable events documented.

## 2023-12-01 RX ORDER — CLONAZEPAM 1 MG/1
1 TABLET ORAL 2 TIMES DAILY
Qty: 60 TABLET | Refills: 0 | OUTPATIENT
Start: 2023-12-01

## 2023-12-04 NOTE — TELEPHONE ENCOUNTER
Caller: pt, - out of medication    Medication:  Clonazepam 1mg    Pharmacy: [facility] on file    Next visit: 1.10.24

## 2023-12-06 ENCOUNTER — TELEPHONE (OUTPATIENT)
Dept: PRIMARY CARE | Facility: CLINIC | Age: 68
End: 2023-12-06
Payer: MEDICARE

## 2023-12-06 NOTE — TELEPHONE ENCOUNTER
The patient was difficult to understand. Having trouble talking sentences. Patient said that Dr. Anna is controlling her medication and has taken all of her medication away since she tested positive for marajuana.  She said she is trembling really bad and cannot think or talk clearly.  She needs something to help her clear her mind or wing off the meds she was taking instead of stopping them cold turkey.   She has an appointment on Friday and said she will talk to  then but giving a heads up.

## 2023-12-07 ENCOUNTER — DOCUMENTATION (OUTPATIENT)
Dept: BEHAVIORAL HEALTH | Facility: CLINIC | Age: 68
End: 2023-12-07
Payer: MEDICARE

## 2023-12-07 DIAGNOSIS — F51.01 PRIMARY INSOMNIA: ICD-10-CM

## 2023-12-07 DIAGNOSIS — F41.1 GAD (GENERALIZED ANXIETY DISORDER): ICD-10-CM

## 2023-12-07 RX ORDER — BUSPIRONE HYDROCHLORIDE 10 MG/1
TABLET ORAL
Qty: 120 TABLET | Refills: 0 | Status: SHIPPED | OUTPATIENT
Start: 2023-12-07 | End: 2024-01-11 | Stop reason: SDUPTHER

## 2023-12-07 RX ORDER — TRAZODONE HYDROCHLORIDE 50 MG/1
50-100 TABLET ORAL NIGHTLY
Qty: 60 TABLET | Refills: 0 | Status: SHIPPED | OUTPATIENT
Start: 2023-12-07 | End: 2023-12-08

## 2023-12-07 RX ORDER — ESCITALOPRAM OXALATE 20 MG/1
20 TABLET ORAL DAILY
Qty: 30 TABLET | Refills: 0 | Status: SHIPPED | OUTPATIENT
Start: 2023-12-07 | End: 2023-12-08

## 2023-12-07 RX ORDER — CLONAZEPAM 1 MG/1
1 TABLET ORAL 2 TIMES DAILY
Qty: 60 TABLET | Refills: 0 | Status: SHIPPED | OUTPATIENT
Start: 2023-12-07 | End: 2024-01-11 | Stop reason: SDUPTHER

## 2023-12-07 ASSESSMENT — ENCOUNTER SYMPTOMS
SHORTNESS OF BREATH: 0
WHEEZING: 0
CONSTITUTIONAL NEGATIVE: 1

## 2023-12-07 NOTE — TELEPHONE ENCOUNTER
Patient is making no sense, cannot complete sentences. Saying that NP Pacer will not refill medication and she's called the pharmacy and theres no meds. She said she hasn't slept in two days. She said she is in pain, and she is scared. Other than this, I cannot make out what she is saying.

## 2023-12-07 NOTE — TELEPHONE ENCOUNTER
Pls call pt to see what is going on.  NP Pacer emailed me and said she did not stop her meds.   Sounds like pt needs an appt with NP Pacer and also she was see neuro for tremors. Did pt see neuro dr morrison for tremors?

## 2023-12-08 ENCOUNTER — APPOINTMENT (OUTPATIENT)
Dept: PRIMARY CARE | Facility: CLINIC | Age: 68
End: 2023-12-08
Payer: MEDICARE

## 2023-12-08 NOTE — PROGRESS NOTES
Subjective   Patient ID: Sweta Lim is a 68 y.o. female who presents for No chief complaint on file..  Last physical: 11/3/23  Colon done 23  Last labs 2023 tsh nl, t4 low, A1c 5.9  2023 Sugar (aka glucose), kidney function, liver function and electrolytes in the CMP (comprehensive metabolic panel) were normal.  Hdl and trigs normal  Ldl high at 135. Goal <100. Decr fats (decrease portions of snacks and desserts) and incr fiber (increase veggies to at least 2 a day and at least 1 fruit a day).  CBC (complete blood count) was normal which looks at infection and anemia markers.    Last eye dr appt-  If in the last 12mon, lala  Does pt use a cpap?  Did pt do 24hr urine?  Did pt see a therapist?  Bps at home-  Any other questions or concerns that pt wants to discuss today?    Phq9, gad7    HCC    HPI  Next appts:  Derm 12/15  Psychiatry 1/10  Neuro   Vascular   Cardio  and     Pt called 2d ago and was difficult to understand  Pt stated NP Dickr took all her meds away due to being pos for marijuana  Pt states she was trembling, can't think or talk clearly  Pt states she hasn't slept in two days. She said she is in pain, and she is scared.   Pt stated she has been off clonazepam for 5d. She has been on it for 20+ yrs.  Last rx at pharmacy was 10/7/23 per oaars.  I emailed NP Lucho and this is her response:  I did advise to stop using recreational marijuana and she is not supposed to be off her meds , we can work on this , if she contacts you again please direct her to call  our main number . I have ordered her meds and reviewed the chart . She was seen in September . She has not done the urine drug screen yet , but I will work with her on this , and we will have it  in one month from the next appointment . The clonazepam has dual use for tremors and she was advised to speak to movement disorder about this and I was clear that it is important to stay on , so I am not sure where  she got the impression I was stopping meds , it is possible the pharmacy tried to contact us through the old system and we did not get messages. I will ask nursing to call her today, but I put a note on clonazepam for pharmacy to let her know when it is ready for    Rx for clonazepam was sent in yesterday  by NP Pacer and pt was informed. Her relative and will pick it up for her and give it to her.     No care team member to display     Review of Systems   Constitutional: Negative.    Respiratory:  Negative for shortness of breath and wheezing.    Cardiovascular:  Negative for chest pain.     Objective   There were no vitals taken for this visit.   BP Readings from Last 3 Encounters:   11/30/23 142/72   11/27/23 126/68   11/03/23 136/78     Wt Readings from Last 3 Encounters:   11/30/23 71.5 kg (157 lb 10.1 oz)   11/27/23 71.6 kg (157 lb 12.8 oz)   11/03/23 72.9 kg (160 lb 12.8 oz)       Physical Exam  Constitutional:       General: She is not in acute distress.     Appearance: Normal appearance.   Neurological:      Mental Status: She is alert.     Assessment/Plan   {Assess/PlanSmartLinks:[id number]}

## 2023-12-12 LAB
LABORATORY COMMENT REPORT: NORMAL
PATH REPORT.FINAL DX SPEC: NORMAL
PATH REPORT.GROSS SPEC: NORMAL
PATH REPORT.MICROSCOPIC SPEC OTHER STN: NORMAL
PATH REPORT.TOTAL CANCER: NORMAL

## 2023-12-15 ENCOUNTER — APPOINTMENT (OUTPATIENT)
Dept: PRIMARY CARE | Facility: CLINIC | Age: 68
End: 2023-12-15
Payer: MEDICARE

## 2023-12-25 DIAGNOSIS — G25.0 BENIGN HEAD TREMOR: ICD-10-CM

## 2023-12-26 RX ORDER — TRIHEXYPHENIDYL HYDROCHLORIDE 2 MG/1
2 TABLET ORAL 3 TIMES DAILY
Qty: 270 TABLET | Refills: 0 | Status: SHIPPED | OUTPATIENT
Start: 2023-12-26 | End: 2024-02-07 | Stop reason: WASHOUT

## 2024-01-11 ENCOUNTER — DOCUMENTATION (OUTPATIENT)
Dept: BEHAVIORAL HEALTH | Facility: CLINIC | Age: 69
End: 2024-01-11
Payer: MEDICARE

## 2024-01-11 DIAGNOSIS — F41.1 GAD (GENERALIZED ANXIETY DISORDER): ICD-10-CM

## 2024-01-11 DIAGNOSIS — F51.01 PRIMARY INSOMNIA: ICD-10-CM

## 2024-01-11 RX ORDER — CLONAZEPAM 1 MG/1
1 TABLET ORAL 2 TIMES DAILY
Qty: 60 TABLET | Refills: 0 | Status: SHIPPED | OUTPATIENT
Start: 2024-01-11 | End: 2024-01-16 | Stop reason: SDUPTHER

## 2024-01-11 RX ORDER — TRAZODONE HYDROCHLORIDE 50 MG/1
TABLET ORAL
Qty: 180 TABLET | Refills: 0 | Status: SHIPPED | OUTPATIENT
Start: 2024-01-11

## 2024-01-11 RX ORDER — BUSPIRONE HYDROCHLORIDE 10 MG/1
TABLET ORAL
Qty: 120 TABLET | Refills: 0 | Status: SHIPPED | OUTPATIENT
Start: 2024-01-11 | End: 2024-01-16 | Stop reason: SDUPTHER

## 2024-01-11 RX ORDER — ESCITALOPRAM OXALATE 20 MG/1
20 TABLET ORAL DAILY
Qty: 90 TABLET | Refills: 0 | Status: SHIPPED | OUTPATIENT
Start: 2024-01-11 | End: 2024-01-16 | Stop reason: SDUPTHER

## 2024-01-11 NOTE — PROGRESS NOTES
Nonbillable note : reviewed med list in the [facility] emr and sent over medications , patient called and said she missed her appointment yesterday ( writer called patient and left a message when she was not there for the scheduled virtual epic appointment ) and she said she is rescheduling first available , but will be out of meds . Reviewed chart in the [facility] em[facility] and ordered meds to her pharmacy , communicated with nursing staff about scripts being sent kpacer cns

## 2024-01-16 ENCOUNTER — OFFICE VISIT (OUTPATIENT)
Dept: BEHAVIORAL HEALTH | Facility: CLINIC | Age: 69
End: 2024-01-16
Payer: MEDICARE

## 2024-01-16 VITALS
BODY MASS INDEX: 24.8 KG/M2 | WEIGHT: 158 LBS | HEIGHT: 67 IN | DIASTOLIC BLOOD PRESSURE: 82 MMHG | SYSTOLIC BLOOD PRESSURE: 160 MMHG | HEART RATE: 82 BPM

## 2024-01-16 DIAGNOSIS — Z79.899 LONG-TERM CURRENT USE OF BENZODIAZEPINE: ICD-10-CM

## 2024-01-16 DIAGNOSIS — F33.1 MODERATE EPISODE OF RECURRENT MAJOR DEPRESSIVE DISORDER (MULTI): ICD-10-CM

## 2024-01-16 DIAGNOSIS — F41.1 GAD (GENERALIZED ANXIETY DISORDER): ICD-10-CM

## 2024-01-16 PROCEDURE — 3008F BODY MASS INDEX DOCD: CPT | Performed by: CLINICAL NURSE SPECIALIST

## 2024-01-16 PROCEDURE — 3077F SYST BP >= 140 MM HG: CPT | Performed by: CLINICAL NURSE SPECIALIST

## 2024-01-16 PROCEDURE — 3079F DIAST BP 80-89 MM HG: CPT | Performed by: CLINICAL NURSE SPECIALIST

## 2024-01-16 PROCEDURE — 1125F AMNT PAIN NOTED PAIN PRSNT: CPT | Performed by: CLINICAL NURSE SPECIALIST

## 2024-01-16 PROCEDURE — 99214 OFFICE O/P EST MOD 30 MIN: CPT | Performed by: CLINICAL NURSE SPECIALIST

## 2024-01-16 RX ORDER — CLONAZEPAM 1 MG/1
1 TABLET ORAL 2 TIMES DAILY
Qty: 60 TABLET | Refills: 1 | Status: SHIPPED | OUTPATIENT
Start: 2024-01-16 | End: 2024-03-26 | Stop reason: SDUPTHER

## 2024-01-16 RX ORDER — CLONAZEPAM 1 MG/1
1 TABLET ORAL 2 TIMES DAILY
Qty: 60 TABLET | Refills: 1 | Status: SHIPPED | OUTPATIENT
Start: 2024-01-16 | End: 2024-01-16 | Stop reason: SDUPTHER

## 2024-01-16 RX ORDER — BUSPIRONE HYDROCHLORIDE 10 MG/1
TABLET ORAL
Qty: 120 TABLET | Refills: 0 | Status: SHIPPED | OUTPATIENT
Start: 2024-01-16 | End: 2024-03-26 | Stop reason: SDUPTHER

## 2024-01-16 RX ORDER — ESCITALOPRAM OXALATE 20 MG/1
20 TABLET ORAL DAILY
Qty: 90 TABLET | Refills: 0 | Status: SHIPPED | OUTPATIENT
Start: 2024-01-16 | End: 2024-03-26 | Stop reason: SDUPTHER
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
## 2024-01-22 ENCOUNTER — TELEPHONE (OUTPATIENT)
Dept: PRIMARY CARE | Facility: CLINIC | Age: 69
End: 2024-01-22
Payer: MEDICARE

## 2024-01-22 NOTE — TELEPHONE ENCOUNTER
Patient fell and needs an appointment.  She thinks she broke her hand.  She cannot wait until Feb and she will not go to ER.  Can you squeeze her in or get her an order for an x ray.

## 2024-01-23 ENCOUNTER — TELEPHONE (OUTPATIENT)
Dept: PRIMARY CARE | Facility: CLINIC | Age: 69
End: 2024-01-23
Payer: MEDICARE

## 2024-02-04 ASSESSMENT — ENCOUNTER SYMPTOMS
CONSTITUTIONAL NEGATIVE: 1
WHEEZING: 0
SHORTNESS OF BREATH: 0

## 2024-02-04 NOTE — PROGRESS NOTES
Subjective   Patient ID: Sweta Lim is a 68 y.o. female who presents for Follow-up.  Last physical:  11/3/23  Colon 23; due 2024    ACP  Did pt see a psychologist/therapist? No   How is her hand healing? Good   How did she hurt her knee? Fell on cement   When did she hurt her knee?  3 weeks   Which knee? Left   Does pt want to discuss quitting smoking? Yes   Any other questions or concerns that pt wants to discuss today?   No     HCC      HPI  24 pt fell and hurt lf hand and lf knee. Went to ER 24 and not broken; hand swelling went away in 2d  Saw dr iglesias for lf knee; will see her for follow up    Aunt and cousin  of copd  Smoking  ppd      No care team member to display     Review of Systems   Constitutional: Negative.    Respiratory:  Negative for shortness of breath and wheezing.    Cardiovascular:  Negative for chest pain.       Objective   Visit Vitals  /75   Pulse 87   Temp 36.1 °C (96.9 °F)      BP Readings from Last 3 Encounters:   24 121/75   24 160/82   23 142/72     Wt Readings from Last 3 Encounters:   24 72.6 kg (160 lb)   24 71.7 kg (158 lb)   23 71.5 kg (157 lb 10.1 oz)       Physical Exam  Constitutional:       General: She is not in acute distress.     Appearance: Normal appearance.   Neurological:      Mental Status: She is alert.       Assessment/Plan   Diagnoses and all orders for this visit:  Cigarette smoker  -     nicotine (Nicoderm CQ) 7 mg/24 hr patch; Place 1 patch over 24 hours on the skin once every 24 hours.  -     nicotine polacrilex (Nicorette) 2 mg lozenge; Use 1 lozenge (2 mg) in the mouth or throat every 2 hours if needed for smoking cessation.  Rheumatoid arthritis, involving unspecified site, unspecified whether rheumatoid factor present (CMS/[facility])  Carotid artery disease, unspecified laterality, unspecified type (CMS/[facility])  Diabetic polyneuropathy associated with type 2 diabetes mellitus  (CMS/HCC)

## 2024-02-05 ENCOUNTER — OFFICE VISIT (OUTPATIENT)
Dept: PRIMARY CARE | Facility: CLINIC | Age: 69
End: 2024-02-05
Payer: MEDICARE

## 2024-02-05 VITALS
WEIGHT: 160 LBS | SYSTOLIC BLOOD PRESSURE: 121 MMHG | TEMPERATURE: 96.9 F | BODY MASS INDEX: 25.11 KG/M2 | HEIGHT: 67 IN | DIASTOLIC BLOOD PRESSURE: 75 MMHG | HEART RATE: 87 BPM | OXYGEN SATURATION: 92 %

## 2024-02-05 DIAGNOSIS — E11.42 DIABETIC POLYNEUROPATHY ASSOCIATED WITH TYPE 2 DIABETES MELLITUS (MULTI): ICD-10-CM

## 2024-02-05 DIAGNOSIS — F17.210 CIGARETTE SMOKER: Primary | ICD-10-CM

## 2024-02-05 DIAGNOSIS — M06.9 RHEUMATOID ARTHRITIS, INVOLVING UNSPECIFIED SITE, UNSPECIFIED WHETHER RHEUMATOID FACTOR PRESENT (MULTI): ICD-10-CM

## 2024-02-05 DIAGNOSIS — I77.9 CAROTID ARTERY DISEASE, UNSPECIFIED LATERALITY, UNSPECIFIED TYPE (CMS-HCC): ICD-10-CM

## 2024-02-05 PROBLEM — F11.20 NARCOTIC DEPENDENCE (MULTI): Status: RESOLVED | Noted: 2023-03-30 | Resolved: 2024-02-05

## 2024-02-05 PROCEDURE — 3078F DIAST BP <80 MM HG: CPT | Performed by: NURSE PRACTITIONER

## 2024-02-05 PROCEDURE — 3008F BODY MASS INDEX DOCD: CPT | Performed by: NURSE PRACTITIONER

## 2024-02-05 PROCEDURE — 3074F SYST BP LT 130 MM HG: CPT | Performed by: NURSE PRACTITIONER

## 2024-02-05 PROCEDURE — 99213 OFFICE O/P EST LOW 20 MIN: CPT | Performed by: NURSE PRACTITIONER

## 2024-02-05 PROCEDURE — 1124F ACP DISCUSS-NO DSCNMKR DOCD: CPT | Performed by: NURSE PRACTITIONER

## 2024-02-05 PROCEDURE — 1125F AMNT PAIN NOTED PAIN PRSNT: CPT | Performed by: NURSE PRACTITIONER

## 2024-02-05 PROCEDURE — 1159F MED LIST DOCD IN RCRD: CPT | Performed by: NURSE PRACTITIONER

## 2024-02-05 RX ORDER — NICOTINE POLACRILEX 2 MG/1
2 LOZENGE ORAL EVERY 2 HOUR PRN
Qty: 200 LOZENGE | Refills: 5 | Status: SHIPPED | OUTPATIENT
Start: 2024-02-05 | End: 2024-05-15

## 2024-02-05 RX ORDER — NICOTINE 7MG/24HR
1 PATCH, TRANSDERMAL 24 HOURS TRANSDERMAL EVERY 24 HOURS
Qty: 30 PATCH | Refills: 3 | Status: SHIPPED | OUTPATIENT
Start: 2024-02-05 | End: 2024-06-04

## 2024-02-05 ASSESSMENT — PATIENT HEALTH QUESTIONNAIRE - PHQ9
1. LITTLE INTEREST OR PLEASURE IN DOING THINGS: NOT AT ALL
10. IF YOU CHECKED OFF ANY PROBLEMS, HOW DIFFICULT HAVE THESE PROBLEMS MADE IT FOR YOU TO DO YOUR WORK, TAKE CARE OF THINGS AT HOME, OR GET ALONG WITH OTHER PEOPLE: NOT DIFFICULT AT ALL
SUM OF ALL RESPONSES TO PHQ9 QUESTIONS 1 AND 2: 1
2. FEELING DOWN, DEPRESSED OR HOPELESS: SEVERAL DAYS

## 2024-02-05 NOTE — PATIENT INSTRUCTIONS
Talk to dr morrison about other options for tremor  If dr morrison won't decr clonazepam slowly, then talk to NP pacer about decreasing.  Let me know if either doesn't work out to decr the clonazepam    Start nicoderm patch 1 patch daily  Start nicorette lozenge -can use every 2hrs as needed    Return in nov for physical      I will communicate with you via VODECLIC regarding messages and results. If you need help with this, you can call the support line at [phone].    IT WAS A PLEASURE TO SEE YOU TODAY. THANK YOU FOR CHOOSING US FOR YOUR HEALTHCARE NEEDS.

## 2024-02-07 ENCOUNTER — OFFICE VISIT (OUTPATIENT)
Dept: NEUROLOGY | Facility: CLINIC | Age: 69
End: 2024-02-07
Payer: MEDICARE

## 2024-02-07 VITALS
DIASTOLIC BLOOD PRESSURE: 71 MMHG | BODY MASS INDEX: 25.16 KG/M2 | HEIGHT: 67 IN | SYSTOLIC BLOOD PRESSURE: 119 MMHG | WEIGHT: 160.3 LBS | HEART RATE: 89 BPM

## 2024-02-07 DIAGNOSIS — M48.02 CERVICAL STENOSIS OF SPINAL CANAL: Primary | ICD-10-CM

## 2024-02-07 DIAGNOSIS — G95.9 MYELOPATHY (MULTI): ICD-10-CM

## 2024-02-07 PROCEDURE — 3074F SYST BP LT 130 MM HG: CPT | Performed by: PSYCHIATRY & NEUROLOGY

## 2024-02-07 PROCEDURE — 3008F BODY MASS INDEX DOCD: CPT | Performed by: PSYCHIATRY & NEUROLOGY

## 2024-02-07 PROCEDURE — 1159F MED LIST DOCD IN RCRD: CPT | Performed by: PSYCHIATRY & NEUROLOGY

## 2024-02-07 PROCEDURE — 1160F RVW MEDS BY RX/DR IN RCRD: CPT | Performed by: PSYCHIATRY & NEUROLOGY

## 2024-02-07 PROCEDURE — 3078F DIAST BP <80 MM HG: CPT | Performed by: PSYCHIATRY & NEUROLOGY

## 2024-02-07 PROCEDURE — 99214 OFFICE O/P EST MOD 30 MIN: CPT | Performed by: PSYCHIATRY & NEUROLOGY

## 2024-02-07 PROCEDURE — 1125F AMNT PAIN NOTED PAIN PRSNT: CPT | Performed by: PSYCHIATRY & NEUROLOGY
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
## 2024-02-07 NOTE — PATIENT INSTRUCTIONS
It was nice to see you today.   I have ordered an MRI scan of the neck as you have some neck flexion and extension weakness.   Follow up w Dr Crump  Continue Primidone at the present dose, we need to make sure you take the meds as prescribed.   Let's taper off the Trihexyphenydil - you are currently taking 1 tab twice a day - reduce to 1/2 tab twice a day  for 2 weeks  and then 1/2 tab once daily and then stop.   I have ordered a Neuropsychology testing for  you.   RTC in 6-9 months.

## 2024-02-08 DIAGNOSIS — E78.5 DYSLIPIDEMIA: ICD-10-CM

## 2024-02-08 RX ORDER — SIMVASTATIN 40 MG/1
40 TABLET, FILM COATED ORAL DAILY
Qty: 90 TABLET | Refills: 2 | Status: SHIPPED | OUTPATIENT
Start: 2024-02-08

## 2024-02-09 DIAGNOSIS — R13.10 DYSPHAGIA: ICD-10-CM

## 2024-02-09 RX ORDER — OMEPRAZOLE 40 MG/1
40 CAPSULE, DELAYED RELEASE ORAL 2 TIMES DAILY
Qty: 180 CAPSULE | Refills: 2 | Status: SHIPPED | OUTPATIENT
Start: 2024-02-09

## 2024-02-16 ENCOUNTER — LAB (OUTPATIENT)
Dept: LAB | Facility: LAB | Age: 69
End: 2024-02-16
Payer: MEDICARE

## 2024-02-16 DIAGNOSIS — Z79.899 LONG-TERM CURRENT USE OF BENZODIAZEPINE: ICD-10-CM

## 2024-02-16 LAB
AMPHETAMINES UR QL SCN: ABNORMAL
BARBITURATES UR QL SCN: ABNORMAL
BENZODIAZ UR QL SCN: ABNORMAL
BZE UR QL SCN: ABNORMAL
CANNABINOIDS UR QL SCN: ABNORMAL
FENTANYL+NORFENTANYL UR QL SCN: ABNORMAL
OPIATES UR QL SCN: ABNORMAL
OXYCODONE+OXYMORPHONE UR QL SCN: ABNORMAL
PCP UR QL SCN: ABNORMAL

## 2024-02-16 PROCEDURE — 80361 OPIATES 1 OR MORE: CPT

## 2024-02-16 PROCEDURE — 80307 DRUG TEST PRSMV CHEM ANLYZR: CPT

## 2024-02-16 PROCEDURE — 80365 DRUG SCREENING OXYCODONE: CPT

## 2024-02-20 ENCOUNTER — APPOINTMENT (OUTPATIENT)
Dept: BEHAVIORAL HEALTH | Facility: CLINIC | Age: 69
End: 2024-02-20
Payer: MEDICARE

## 2024-02-21 ENCOUNTER — APPOINTMENT (OUTPATIENT)
Dept: NEUROSURGERY | Facility: CLINIC | Age: 69
End: 2024-02-21
Payer: MEDICARE

## 2024-02-22 ENCOUNTER — APPOINTMENT (OUTPATIENT)
Dept: BEHAVIORAL HEALTH | Facility: CLINIC | Age: 69
End: 2024-02-22
Payer: MEDICARE

## 2024-02-23 LAB
6MAM UR CFM-MCNC: <25 NG/ML
CODEINE UR CFM-MCNC: <50 NG/ML
HYDROCODONE CTO UR CFM-MCNC: <25 NG/ML
HYDROMORPHONE UR CFM-MCNC: <25 NG/ML
MORPHINE UR CFM-MCNC: <50 NG/ML
NORHYDROCODONE UR CFM-MCNC: <25 NG/ML
NOROXYCODONE UR CFM-MCNC: 477 NG/ML
OXYCODONE UR CFM-MCNC: 189 NG/ML
OXYMORPHONE UR CFM-MCNC: 460 NG/ML

## 2024-02-29 ENCOUNTER — DOCUMENTATION (OUTPATIENT)
Dept: NEUROLOGY | Facility: HOSPITAL | Age: 69
End: 2024-02-29
Payer: MEDICARE

## 2024-03-06 ENCOUNTER — DOCUMENTATION (OUTPATIENT)
Dept: NEUROLOGY | Facility: CLINIC | Age: 69
End: 2024-03-06
Payer: MEDICARE

## 2024-03-06 NOTE — PROGRESS NOTES
Called pt to discuss MRI cervical spine.    Impression:    1. Stable postoperative and spondylitic change.   2. Findings again felt to be potentially most significant at C4-C5 with marked canal compromise and findings suggestive of underlying mild cord edema.   3. No new finding since previous exam.     Advised that need to follow up with neurosurgery.     Needs to stop smoking.

## 2024-03-07 ENCOUNTER — APPOINTMENT (OUTPATIENT)
Dept: BEHAVIORAL HEALTH | Facility: CLINIC | Age: 69
End: 2024-03-07
Payer: MEDICARE

## 2024-03-11 ENCOUNTER — OFFICE VISIT (OUTPATIENT)
Dept: NEUROSURGERY | Facility: CLINIC | Age: 69
End: 2024-03-11
Payer: MEDICARE

## 2024-03-11 VITALS
DIASTOLIC BLOOD PRESSURE: 66 MMHG | WEIGHT: 170 LBS | HEIGHT: 67 IN | SYSTOLIC BLOOD PRESSURE: 131 MMHG | BODY MASS INDEX: 26.68 KG/M2 | TEMPERATURE: 96.8 F | HEART RATE: 71 BPM | RESPIRATION RATE: 20 BRPM

## 2024-03-11 DIAGNOSIS — M50.00 CERVICAL DISC DISEASE WITH MYELOPATHY: Primary | ICD-10-CM

## 2024-03-11 PROCEDURE — 1160F RVW MEDS BY RX/DR IN RCRD: CPT | Performed by: NURSE PRACTITIONER

## 2024-03-11 PROCEDURE — 3008F BODY MASS INDEX DOCD: CPT | Performed by: NURSE PRACTITIONER

## 2024-03-11 PROCEDURE — 1125F AMNT PAIN NOTED PAIN PRSNT: CPT | Performed by: NURSE PRACTITIONER

## 2024-03-11 PROCEDURE — 99214 OFFICE O/P EST MOD 30 MIN: CPT | Performed by: NURSE PRACTITIONER

## 2024-03-11 PROCEDURE — 3078F DIAST BP <80 MM HG: CPT | Performed by: NURSE PRACTITIONER

## 2024-03-11 PROCEDURE — 3075F SYST BP GE 130 - 139MM HG: CPT | Performed by: NURSE PRACTITIONER

## 2024-03-11 PROCEDURE — 1159F MED LIST DOCD IN RCRD: CPT | Performed by: NURSE PRACTITIONER

## 2024-03-11 SDOH — ECONOMIC STABILITY: FOOD INSECURITY: WITHIN THE PAST 12 MONTHS, THE FOOD YOU BOUGHT JUST DIDN'T LAST AND YOU DIDN'T HAVE MONEY TO GET MORE.: NEVER TRUE

## 2024-03-11 SDOH — ECONOMIC STABILITY: FOOD INSECURITY: WITHIN THE PAST 12 MONTHS, YOU WORRIED THAT YOUR FOOD WOULD RUN OUT BEFORE YOU GOT MONEY TO BUY MORE.: NEVER TRUE

## 2024-03-11 ASSESSMENT — COLUMBIA-SUICIDE SEVERITY RATING SCALE - C-SSRS
1. IN THE PAST MONTH, HAVE YOU WISHED YOU WERE DEAD OR WISHED YOU COULD GO TO SLEEP AND NOT WAKE UP?: NO
6. HAVE YOU EVER DONE ANYTHING, STARTED TO DO ANYTHING, OR PREPARED TO DO ANYTHING TO END YOUR LIFE?: NO
2. HAVE YOU ACTUALLY HAD ANY THOUGHTS OF KILLING YOURSELF?: NO

## 2024-03-11 ASSESSMENT — LIFESTYLE VARIABLES
HOW OFTEN DO YOU HAVE A DRINK CONTAINING ALCOHOL: NEVER
SKIP TO QUESTIONS 9-10: 1
AUDIT-C TOTAL SCORE: 0
HOW OFTEN DO YOU HAVE SIX OR MORE DRINKS ON ONE OCCASION: NEVER
HOW MANY STANDARD DRINKS CONTAINING ALCOHOL DO YOU HAVE ON A TYPICAL DAY: PATIENT DOES NOT DRINK

## 2024-03-11 ASSESSMENT — PATIENT HEALTH QUESTIONNAIRE - PHQ9
2. FEELING DOWN, DEPRESSED OR HOPELESS: NOT AT ALL
SUM OF ALL RESPONSES TO PHQ9 QUESTIONS 1 & 2: 0
1. LITTLE INTEREST OR PLEASURE IN DOING THINGS: NOT AT ALL

## 2024-03-11 ASSESSMENT — ENCOUNTER SYMPTOMS
OCCASIONAL FEELINGS OF UNSTEADINESS: 1
LOSS OF SENSATION IN FEET: 1
DEPRESSION: 1

## 2024-03-11 ASSESSMENT — PAIN SCALES - GENERAL: PAINLEVEL: 6

## 2024-03-11 NOTE — PROGRESS NOTES
Sweta Lim is here today in follow up for cervical stenosis, for lower extremity symptoms (leg and foot weakness), and to review images.     To review, she was last evaluated on 08/24/2023, by Dr. Nick Crump. ACDF C4-5, C6-7 was recommended. Smoking cessation was encouraged.     Today, she reports falling 6 weeks ago and injured her right knee. She reports right leg and foot weakness since she injured her knee. She reports frequent falls. Overall, she states that BLE are weak (no change, but feels progressive per patient). She had imaging completed and is here to review findings.    MRI C SPINE on 02/23/2024:  Inspection of the disc spaces reveal the following:   C1-C2: Negative   C2-C3: Negative   C3-C4: Posterior broad-based disc protrusion with facet hypertrophic changes. Moderate canal stenosis with mild bilateral neural foraminal narrowing. No change since previous exam.   C4-C5: Posterior broad-based disc protrusion with posteriorly directed osteophytes. The protrusion is asymmetric to the left. Marked canal compromise particularly involving the left aspects of the canal similar to the previous exam. Continued severe bilateral neural foraminal narrowing.   C5-C6: Postoperative changes at C5-C6. Facet and uncovertebral degenerative changes on the left with moderate left-sided neural foraminal narrowing. No significant right-sided neural foraminal narrowing. No change since previous exam.   C6-C7: Circumferential disc bulging with a superimposed right neural foraminal disc extrusion. Severe right-sided neural foraminal narrowing. Moderate left-sided neural foraminal narrowing. Mild canal stenosis. No change since previous exam.   C7-T1: Negative   Impression:    1. Stable postoperative and spondylitic change.   2. Findings again felt to be potentially most significant at C4-C5 with marked canal compromise and findings suggestive of underlying mild cord edema.   3. No new finding since previous exam.  "  Electronically signed by:  Jacob Dominguez MD     TREATMENTS:  ACDF C5-6     SMOKER: YES  ANTICOAGULANT USE: YES: Daily ASA    ROS x 10 is, otherwise, negative unless documented in HPI above    /66   Pulse 71   Temp 36 °C (96.8 °F)   Resp 20   Ht 1.702 m (5' 7\")   Wt 77.1 kg (170 lb)   BMI 26.63 kg/m²     On Exam: Appears comfortable  A&O x 4, speech clear / fluent  Respirations even / unlabored  Abdomen without distension  FAYE equally, hand   4 / 5 bilaterally  DTR: 2+ RUE, 1+ LUE, Kj's sign is absent  Gait - tandem gait is unsteady    We reviewed MRI C Spine images noting severe canal narrowing at C4 - 5. We also discussed follow up with PCP to discuss left knee pain. CT C Spine prior to appointment with Dr. Crump to discuss surgical intervention. She verbalizes understanding and agreement. Encouraged her to continue efforts to stop smoking.  Sarai Hooker, APRN-CNP          "

## 2024-03-12 ENCOUNTER — APPOINTMENT (OUTPATIENT)
Dept: PRIMARY CARE | Facility: CLINIC | Age: 69
End: 2024-03-12
Payer: MEDICARE

## 2024-03-22 ENCOUNTER — TELEPHONE (OUTPATIENT)
Dept: NEUROSURGERY | Facility: CLINIC | Age: 69
End: 2024-03-22
Payer: MEDICARE

## 2024-03-26 ENCOUNTER — OFFICE VISIT (OUTPATIENT)
Dept: BEHAVIORAL HEALTH | Facility: CLINIC | Age: 69
End: 2024-03-26
Payer: MEDICARE

## 2024-03-26 VITALS
DIASTOLIC BLOOD PRESSURE: 57 MMHG | WEIGHT: 166 LBS | BODY MASS INDEX: 26.06 KG/M2 | HEIGHT: 67 IN | SYSTOLIC BLOOD PRESSURE: 107 MMHG | HEART RATE: 65 BPM

## 2024-03-26 DIAGNOSIS — F41.1 GAD (GENERALIZED ANXIETY DISORDER): ICD-10-CM

## 2024-03-26 DIAGNOSIS — Z79.899 LONG-TERM CURRENT USE OF BENZODIAZEPINE: ICD-10-CM

## 2024-03-26 PROCEDURE — 3074F SYST BP LT 130 MM HG: CPT | Performed by: CLINICAL NURSE SPECIALIST

## 2024-03-26 PROCEDURE — 99214 OFFICE O/P EST MOD 30 MIN: CPT | Performed by: CLINICAL NURSE SPECIALIST

## 2024-03-26 PROCEDURE — 1160F RVW MEDS BY RX/DR IN RCRD: CPT | Performed by: CLINICAL NURSE SPECIALIST

## 2024-03-26 PROCEDURE — 1159F MED LIST DOCD IN RCRD: CPT | Performed by: CLINICAL NURSE SPECIALIST

## 2024-03-26 PROCEDURE — 3008F BODY MASS INDEX DOCD: CPT | Performed by: CLINICAL NURSE SPECIALIST

## 2024-03-26 PROCEDURE — 3078F DIAST BP <80 MM HG: CPT | Performed by: CLINICAL NURSE SPECIALIST

## 2024-03-26 RX ORDER — BUSPIRONE HYDROCHLORIDE 10 MG/1
TABLET ORAL
Qty: 450 TABLET | Refills: 1 | Status: SHIPPED | OUTPATIENT
Start: 2024-03-26

## 2024-03-26 RX ORDER — CLONAZEPAM 1 MG/1
1 TABLET ORAL 2 TIMES DAILY
Qty: 60 TABLET | Refills: 0 | Status: SHIPPED | OUTPATIENT
Start: 2024-03-26 | End: 2024-05-29 | Stop reason: SDUPTHER

## 2024-03-26 RX ORDER — ESCITALOPRAM OXALATE 20 MG/1
20 TABLET ORAL DAILY
Qty: 90 TABLET | Refills: 1 | Status: SHIPPED | OUTPATIENT
Start: 2024-03-26 | End: 2024-06-24
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
## 2024-04-09 ASSESSMENT — ENCOUNTER SYMPTOMS
WHEEZING: 0
SHORTNESS OF BREATH: 0
CONSTITUTIONAL NEGATIVE: 1
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
## 2024-04-10 ENCOUNTER — APPOINTMENT (OUTPATIENT)
Dept: PRIMARY CARE | Facility: CLINIC | Age: 69
End: 2024-04-10
Payer: MEDICARE

## 2024-04-16 DIAGNOSIS — I77.9 CAROTID ARTERY DISEASE, UNSPECIFIED LATERALITY, UNSPECIFIED TYPE (CMS-HCC): ICD-10-CM

## 2024-04-16 RX ORDER — NAPROXEN SODIUM 220 MG/1
81 TABLET, FILM COATED ORAL DAILY
Qty: 90 TABLET | Refills: 3 | Status: SHIPPED | OUTPATIENT
Start: 2024-04-16 | End: 2025-04-16

## 2024-04-16 ASSESSMENT — ENCOUNTER SYMPTOMS
CONSTITUTIONAL NEGATIVE: 1
SHORTNESS OF BREATH: 0
WHEEZING: 0
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
## 2024-04-17 ENCOUNTER — TELEPHONE (OUTPATIENT)
Dept: BEHAVIORAL HEALTH | Facility: CLINIC | Age: 69
End: 2024-04-17

## 2024-04-17 ENCOUNTER — APPOINTMENT (OUTPATIENT)
Dept: PRIMARY CARE | Facility: CLINIC | Age: 69
End: 2024-04-17
Payer: MEDICARE

## 2024-05-08 ENCOUNTER — TELEPHONE (OUTPATIENT)
Dept: PRIMARY CARE | Facility: CLINIC | Age: 69
End: 2024-05-08
Payer: MEDICARE
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
## 2024-05-10 ENCOUNTER — TELEPHONE (OUTPATIENT)
Dept: PRIMARY CARE | Facility: CLINIC | Age: 69
End: 2024-05-10
Payer: MEDICARE

## 2024-05-10 NOTE — TELEPHONE ENCOUNTER
Pt went to ER and got an xray of her right wrist because her ring finger and baby finger is numb. She has appt on 5/16 so xray hopefully can reveal answer to problem.

## 2024-05-13 PROBLEM — H10.9 BACTERIAL CONJUNCTIVITIS: Status: ACTIVE | Noted: 2024-05-13

## 2024-05-13 PROBLEM — S63.509A WRIST SPRAIN: Status: ACTIVE | Noted: 2024-01-24

## 2024-05-13 PROBLEM — K29.70 GASTRITIS: Status: ACTIVE | Noted: 2024-05-13

## 2024-05-13 PROBLEM — I77.9 CAROTID ARTERY DISEASE, UNSPECIFIED LATERALITY, UNSPECIFIED TYPE (CMS-HCC): Status: RESOLVED | Noted: 2023-06-01 | Resolved: 2024-05-13

## 2024-05-13 PROBLEM — K20.90 ESOPHAGITIS: Status: ACTIVE | Noted: 2024-05-13

## 2024-05-13 PROBLEM — M23.90 INTERNAL DERANGEMENT OF KNEE: Status: ACTIVE | Noted: 2024-01-24

## 2024-05-13 PROBLEM — Z86.39 HISTORY OF DIABETES MELLITUS: Status: RESOLVED | Noted: 2024-05-13 | Resolved: 2024-05-13

## 2024-05-13 PROBLEM — J20.9 ACUTE BRONCHITIS WITH BRONCHOSPASM: Status: ACTIVE | Noted: 2024-05-13

## 2024-05-13 PROBLEM — K57.30 DIVERTICULOSIS OF LARGE INTESTINE: Status: ACTIVE | Noted: 2024-05-13

## 2024-05-13 PROBLEM — K44.9 DIAPHRAGMATIC HERNIA: Status: ACTIVE | Noted: 2024-05-13

## 2024-05-13 PROBLEM — Z86.39 HISTORY OF DIABETES MELLITUS: Status: ACTIVE | Noted: 2024-05-13

## 2024-05-13 PROBLEM — G95.9 DISEASE OF SPINAL CORD (MULTI): Status: ACTIVE | Noted: 2024-05-13

## 2024-05-13 PROBLEM — Z98.890 HISTORY OF GENERAL ANESTHESIA: Status: RESOLVED | Noted: 2023-11-30 | Resolved: 2024-05-13

## 2024-05-13 PROBLEM — H61.20 IMPACTED CERUMEN: Status: ACTIVE | Noted: 2024-05-13

## 2024-05-13 PROBLEM — K64.9 HEMORRHOIDS: Status: ACTIVE | Noted: 2024-05-13

## 2024-05-16 ENCOUNTER — OFFICE VISIT (OUTPATIENT)
Dept: PRIMARY CARE | Facility: CLINIC | Age: 69
End: 2024-05-16
Payer: MEDICARE

## 2024-05-16 VITALS
DIASTOLIC BLOOD PRESSURE: 68 MMHG | HEART RATE: 70 BPM | BODY MASS INDEX: 26 KG/M2 | WEIGHT: 166 LBS | OXYGEN SATURATION: 91 % | TEMPERATURE: 97.3 F | SYSTOLIC BLOOD PRESSURE: 117 MMHG

## 2024-05-16 DIAGNOSIS — R80.9 MICROALBUMINURIA: ICD-10-CM

## 2024-05-16 DIAGNOSIS — I10 BENIGN ESSENTIAL HYPERTENSION: Primary | ICD-10-CM

## 2024-05-16 DIAGNOSIS — E11.9 TYPE 2 DIABETES MELLITUS WITHOUT COMPLICATION, WITHOUT LONG-TERM CURRENT USE OF INSULIN (MULTI): ICD-10-CM

## 2024-05-16 DIAGNOSIS — E78.2 MIXED HYPERLIPIDEMIA: ICD-10-CM

## 2024-05-16 DIAGNOSIS — L30.9 DERMATITIS: ICD-10-CM

## 2024-05-16 DIAGNOSIS — H61.23 BILATERAL IMPACTED CERUMEN: ICD-10-CM

## 2024-05-16 DIAGNOSIS — F17.210 CIGARETTE SMOKER: ICD-10-CM

## 2024-05-16 PROBLEM — H61.20 IMPACTED CERUMEN: Status: RESOLVED | Noted: 2024-05-13 | Resolved: 2024-05-16

## 2024-05-16 PROBLEM — J20.9 ACUTE BRONCHITIS WITH BRONCHOSPASM: Status: RESOLVED | Noted: 2024-05-13 | Resolved: 2024-05-16

## 2024-05-16 PROBLEM — S63.509A WRIST SPRAIN: Status: RESOLVED | Noted: 2024-01-24 | Resolved: 2024-05-16

## 2024-05-16 PROBLEM — H10.9 BACTERIAL CONJUNCTIVITIS: Status: RESOLVED | Noted: 2024-05-13 | Resolved: 2024-05-16

## 2024-05-16 LAB
POC ALBUMIN /CREATININE RATIO MANUALLY ENTERED: ABNORMAL UG/MG CREAT
POC HEMOGLOBIN A1C: 7.4 % (ref 4.2–6.5)
POC URINE ALBUMIN: 150 MG/L
POC URINE CREATININE: 200 MG/DL

## 2024-05-16 PROCEDURE — 3078F DIAST BP <80 MM HG: CPT | Performed by: NURSE PRACTITIONER

## 2024-05-16 PROCEDURE — 83036 HEMOGLOBIN GLYCOSYLATED A1C: CPT | Performed by: NURSE PRACTITIONER

## 2024-05-16 PROCEDURE — 1160F RVW MEDS BY RX/DR IN RCRD: CPT | Performed by: NURSE PRACTITIONER

## 2024-05-16 PROCEDURE — 3008F BODY MASS INDEX DOCD: CPT | Performed by: NURSE PRACTITIONER

## 2024-05-16 PROCEDURE — 1159F MED LIST DOCD IN RCRD: CPT | Performed by: NURSE PRACTITIONER

## 2024-05-16 PROCEDURE — 3074F SYST BP LT 130 MM HG: CPT | Performed by: NURSE PRACTITIONER

## 2024-05-16 PROCEDURE — 99214 OFFICE O/P EST MOD 30 MIN: CPT | Performed by: NURSE PRACTITIONER

## 2024-05-16 PROCEDURE — 82044 UR ALBUMIN SEMIQUANTITATIVE: CPT | Performed by: NURSE PRACTITIONER

## 2024-05-16 RX ORDER — TRIAMCINOLONE ACETONIDE 1 MG/G
CREAM TOPICAL 2 TIMES DAILY
Qty: 30 G | Refills: 1 | Status: SHIPPED | OUTPATIENT
Start: 2024-05-16

## 2024-05-16 ASSESSMENT — ENCOUNTER SYMPTOMS
WHEEZING: 0
SHORTNESS OF BREATH: 0
CONSTITUTIONAL NEGATIVE: 1

## 2024-05-16 NOTE — PATIENT INSTRUCTIONS
Ear irrigation bilat  Successful removal of bilat earwax  Pt carmelita procedure well    Get the brace for the rt foot-ask dr crisostomo if brace is for night  Make follow up appt with dr crisostomo    Keep dr morrison appt-discuss neck pain    Cream for hands    See dr oliver for hammer toe    See dr iglesias for wrist and knee pain    A1C today=7.4  This is the 3 month average of your sugars.  Goal <7.0    Urine abnormal so still do 24hr urine    Goal LDL <100  Goal trigs <150  Goal HDL >50  Exercise-cardio 4-5d/wk 30min each day  Diet-Breakfast-toast (my favorite Jane Vasquez Delightful multi-grain and [employer] Killer Bread thin-sliced Good Seed)/bagel/English muffin-whole wheat flour as a 1st ingredient or cereal/oatmeal/granola bar-fiber 4g or more; protein like eggs or peanut butter is Ok  Lunch-protein, veg 1c  Dinner-protein, veg 1c; if having potatoes, rice, noodles, or pasta-->fist sized; could try brown rice or whole wheat pasta or quinoa  Fruit 2 a day  Dairy 2 a day-milk, almond milk, soy milk, yogurt, cottage cheese, yogurt  Snacks-Protein-hard boiled egg, nuts (walnuts/almonds/pecans/pistachios 1/4c), hummus, beef/deer jerky; vegetable, fruit, dairy-milk(1%, skim, almond, soy)/cheese (not a lot of cheddar)/yogurt (Greek is best-my favorite Dannon Fruit on the Bottom Greek)/cottage cheese 2%; triscuits, popcorn have a lot of fiber; serving size  Water  Limit alcohol to 2 drinks per day (1 drink=12oz beer or 5oz wine or 1 1/2oz liquor)  appt in 5  months; be fasting      I will communicate with you via [employer] regarding messages and results. If you need help with this, you can call the support line at [phone].    IT WAS A PLEASURE TO SEE YOU TODAY. THANK YOU FOR CHOOSING US FOR YOUR HEALTHCARE NEEDS.
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
## 2024-05-20 ENCOUNTER — APPOINTMENT (OUTPATIENT)
Dept: CARDIOLOGY | Facility: CLINIC | Age: 69
End: 2024-05-20
Payer: MEDICARE

## 2024-05-20 ENCOUNTER — TELEPHONE (OUTPATIENT)
Dept: PRIMARY CARE | Facility: CLINIC | Age: 69
End: 2024-05-20
Payer: MEDICARE

## 2024-05-20 DIAGNOSIS — J40 BRONCHITIS: Primary | ICD-10-CM

## 2024-05-20 NOTE — TELEPHONE ENCOUNTER
Pt was seen on 5/16 for a hospital follow up. She said that over the weekend she began experiencing ear pain, a sore throat, and a cough. Pt would like to know if she is able to get an antibiotic sent in to the pharmacy, or if Leah would like to see her in the office first.       [facility]/pharmacy #[id number] - [address] AT [address] [address]  Phone: [phone] Fax: [phone]

## 2024-05-21 ENCOUNTER — HOSPITAL ENCOUNTER (OUTPATIENT)
Dept: CARDIOLOGY | Facility: CLINIC | Age: 69
Discharge: HOME | End: 2024-05-21
Payer: MEDICARE

## 2024-05-21 ENCOUNTER — HOSPITAL ENCOUNTER (OUTPATIENT)
Dept: VASCULAR MEDICINE | Facility: CLINIC | Age: 69
Discharge: HOME | End: 2024-05-21
Payer: MEDICARE

## 2024-05-21 DIAGNOSIS — R06.02 SHORTNESS OF BREATH: ICD-10-CM

## 2024-05-21 DIAGNOSIS — I77.9 CAROTID ARTERY DISEASE, UNSPECIFIED LATERALITY, UNSPECIFIED TYPE (CMS-HCC): ICD-10-CM

## 2024-05-21 DIAGNOSIS — I65.23 OCCLUSION AND STENOSIS OF BILATERAL CAROTID ARTERIES: ICD-10-CM

## 2024-05-21 DIAGNOSIS — I35.9 AORTIC VALVE DISEASE: ICD-10-CM

## 2024-05-21 LAB
AORTIC VALVE MEAN GRADIENT: 13.1 MMHG
AORTIC VALVE PEAK VELOCITY: 2.48 M/S
AV PEAK GRADIENT: 24.6 MMHG
AVA (PEAK VEL): 1.82 CM2
AVA (VTI): 1.85 CM2
EJECTION FRACTION APICAL 4 CHAMBER: 66.3
LEFT VENTRICLE INTERNAL DIMENSION DIASTOLE: 4.91 CM (ref 3.5–6)
LEFT VENTRICULAR OUTFLOW TRACT DIAMETER: 2.05 CM
LV EJECTION FRACTION BIPLANE: 64 %
RIGHT VENTRICLE FREE WALL PEAK S': 0.19 CM/S
RIGHT VENTRICLE PEAK SYSTOLIC PRESSURE: 26.6 MMHG
TRICUSPID ANNULAR PLANE SYSTOLIC EXCURSION: 2.2 CM

## 2024-05-21 PROCEDURE — 93880 EXTRACRANIAL BILAT STUDY: CPT

## 2024-05-21 PROCEDURE — 93880 EXTRACRANIAL BILAT STUDY: CPT | Performed by: SURGERY

## 2024-05-21 PROCEDURE — 93306 TTE W/DOPPLER COMPLETE: CPT

## 2024-05-21 PROCEDURE — 93306 TTE W/DOPPLER COMPLETE: CPT | Performed by: STUDENT IN AN ORGANIZED HEALTH CARE EDUCATION/TRAINING PROGRAM

## 2024-05-21 RX ORDER — DOXYCYCLINE 100 MG/1
100 CAPSULE ORAL 2 TIMES DAILY
Qty: 20 CAPSULE | Refills: 0 | Status: SHIPPED | OUTPATIENT
Start: 2024-05-21 | End: 2024-05-31

## 2024-05-21 NOTE — TELEPHONE ENCOUNTER
I will send in an antibiotic  Pt can use the albuterol inhaler for the cough  She can also use mucinex otc for the cough  She can use claritin for the ear pain and ST  Increase fluids as well  Pt should call me if sob, wheezing, tight chest or fever/chills

## 2024-05-28 ENCOUNTER — OFFICE VISIT (OUTPATIENT)
Dept: NEUROSURGERY | Facility: CLINIC | Age: 69
End: 2024-05-28
Payer: MEDICARE

## 2024-05-28 ENCOUNTER — APPOINTMENT (OUTPATIENT)
Dept: VASCULAR MEDICINE | Facility: CLINIC | Age: 69
End: 2024-05-28
Payer: MEDICARE

## 2024-05-28 VITALS
SYSTOLIC BLOOD PRESSURE: 174 MMHG | HEART RATE: 72 BPM | BODY MASS INDEX: 26.68 KG/M2 | TEMPERATURE: 96.9 F | DIASTOLIC BLOOD PRESSURE: 86 MMHG | WEIGHT: 170 LBS | HEIGHT: 67 IN

## 2024-05-28 DIAGNOSIS — M48.02 DEGENERATIVE CERVICAL SPINAL STENOSIS: Primary | ICD-10-CM

## 2024-05-28 DIAGNOSIS — M47.12 CERVICAL SPONDYLOSIS WITH MYELOPATHY: ICD-10-CM

## 2024-05-28 PROCEDURE — 3008F BODY MASS INDEX DOCD: CPT | Performed by: NEUROLOGICAL SURGERY

## 2024-05-28 PROCEDURE — 3079F DIAST BP 80-89 MM HG: CPT | Performed by: NEUROLOGICAL SURGERY

## 2024-05-28 PROCEDURE — 1159F MED LIST DOCD IN RCRD: CPT | Performed by: NEUROLOGICAL SURGERY

## 2024-05-28 PROCEDURE — 3077F SYST BP >= 140 MM HG: CPT | Performed by: NEUROLOGICAL SURGERY

## 2024-05-28 PROCEDURE — 1160F RVW MEDS BY RX/DR IN RCRD: CPT | Performed by: NEUROLOGICAL SURGERY

## 2024-05-28 PROCEDURE — 1125F AMNT PAIN NOTED PAIN PRSNT: CPT | Performed by: NEUROLOGICAL SURGERY

## 2024-05-28 PROCEDURE — 99214 OFFICE O/P EST MOD 30 MIN: CPT | Performed by: NEUROLOGICAL SURGERY

## 2024-05-28 RX ORDER — FLUTICASONE FUROATE, UMECLIDINIUM BROMIDE AND VILANTEROL TRIFENATATE 100; 62.5; 25 UG/1; UG/1; UG/1
POWDER RESPIRATORY (INHALATION)
COMMUNITY
Start: 2024-05-03

## 2024-05-28 ASSESSMENT — PATIENT HEALTH QUESTIONNAIRE - PHQ9
2. FEELING DOWN, DEPRESSED OR HOPELESS: NEARLY EVERY DAY
6. FEELING BAD ABOUT YOURSELF - OR THAT YOU ARE A FAILURE OR HAVE LET YOURSELF OR YOUR FAMILY DOWN: NEARLY EVERY DAY
10. IF YOU CHECKED OFF ANY PROBLEMS, HOW DIFFICULT HAVE THESE PROBLEMS MADE IT FOR YOU TO DO YOUR WORK, TAKE CARE OF THINGS AT HOME, OR GET ALONG WITH OTHER PEOPLE: EXTREMELY DIFFICULT
4. FEELING TIRED OR HAVING LITTLE ENERGY: NEARLY EVERY DAY
8. MOVING OR SPEAKING SO SLOWLY THAT OTHER PEOPLE COULD HAVE NOTICED. OR THE OPPOSITE, BEING SO FIGETY OR RESTLESS THAT YOU HAVE BEEN MOVING AROUND A LOT MORE THAN USUAL: NEARLY EVERY DAY
3. TROUBLE FALLING OR STAYING ASLEEP: NEARLY EVERY DAY
7. TROUBLE CONCENTRATING ON THINGS, SUCH AS READING THE NEWSPAPER OR WATCHING TELEVISION: NEARLY EVERY DAY
9. THOUGHTS THAT YOU WOULD BE BETTER OFF DEAD, OR OF HURTING YOURSELF: NOT AT ALL
SUM OF ALL RESPONSES TO PHQ QUESTIONS 1-9: 24
1. LITTLE INTEREST OR PLEASURE IN DOING THINGS: NEARLY EVERY DAY
5. POOR APPETITE OR OVEREATING: NEARLY EVERY DAY
SUM OF ALL RESPONSES TO PHQ9 QUESTIONS 1 & 2: 6

## 2024-05-28 ASSESSMENT — PAIN SCALES - GENERAL: PAINLEVEL: 7
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
## 2024-05-29 ENCOUNTER — DOCUMENTATION (OUTPATIENT)
Dept: BEHAVIORAL HEALTH | Facility: CLINIC | Age: 69
End: 2024-05-29
Payer: MEDICARE

## 2024-05-29 DIAGNOSIS — Z79.899 LONG-TERM CURRENT USE OF BENZODIAZEPINE: ICD-10-CM

## 2024-05-29 DIAGNOSIS — F33.1 MODERATE EPISODE OF RECURRENT MAJOR DEPRESSIVE DISORDER (MULTI): ICD-10-CM

## 2024-05-29 DIAGNOSIS — M48.02 DEGENERATIVE CERVICAL SPINAL STENOSIS: Primary | ICD-10-CM

## 2024-05-29 DIAGNOSIS — F41.1 GENERALIZED ANXIETY DISORDER: ICD-10-CM

## 2024-05-29 DIAGNOSIS — F41.1 GAD (GENERALIZED ANXIETY DISORDER): ICD-10-CM

## 2024-05-29 DIAGNOSIS — M47.12 SPONDYLOSIS, CERVICAL, WITH MYELOPATHY: ICD-10-CM

## 2024-05-29 RX ORDER — CLONAZEPAM 1 MG/1
1 TABLET ORAL 2 TIMES DAILY
Qty: 20 TABLET | Refills: 0 | Status: SHIPPED | OUTPATIENT
Start: 2024-05-29 | End: 2024-06-10 | Stop reason: SDUPTHER

## 2024-05-29 NOTE — PROGRESS NOTES
Non billable note : patient has not obtained repeat uds , as discussed at previous appointment , ordered repeat uds . She also was called and scheduled follow up , prior to being called today , she did not have an appointment scheduled  with me . Kpacer cns

## 2024-05-29 NOTE — PROGRESS NOTES
Nonbillable note : oarrs ran today after patient called and requested script for clonazepam , no other controlled meds on oarrs, she is scheduled for  , for a virtual appointment, I spoke to patient per phone today , she has to ask her son if he can help set her up for a zoom appointment on  and she agrees to notify me if he can't do this  .I spoke to patient per phone today and  notified patient of need for urine drug screen test (ordered in chart today , to  in 2 weeks) . Prior to calling patient I reviewed [facility] emr most recent notes neurology ,neurosurgery and psychiatry . Psychiatry previous note indicates need to redo urine drug screen test due to oxycodone being positive , on most recent durg screen , urine, and patient may have had a false positive due to tramadol at that time the uds was done . Per chart , she has since had balance issues and falls , and was assessed by neurosurgery . Will order a 10 day supply of medication clonazepam at the current ordered dose , and discuss titrate down on dose at next appointment with writer , since she has had a noticeable  head tremor managed by neurology and the patient reports clonazepam helps these , tremors worse with anxiety , will continue to coordinate care and clinically at this time , reinforce controlled med protocol and safe prescribing practices . Patient and I discussed plan to start wean down dose to decrease falls risk at next appointment though provided support as to the need to continue to coordinate care as she has upcoming surgery planned and she is working on reducing smoking cigarrettes . Rationale for not reducing dose at this time is due to the tremors and the need to make sure she can follow med instructions , as she does better with written instructions when less anxious , and the most recent script was sent for 1 mg clonazepam , 1 tablet twice a day . She verified she has been taking that dose and she will continue the  dose for now and we will disucss plan moving forward on June 6th appointment .. Will plan for wean instructions to be written specifically on next script and will have patient write them down when she is on the virtual appointment .kpacer cns

## 2024-05-30 ENCOUNTER — APPOINTMENT (OUTPATIENT)
Dept: CARDIOLOGY | Facility: CLINIC | Age: 69
End: 2024-05-30
Payer: MEDICARE

## 2024-06-10 ENCOUNTER — DOCUMENTATION (OUTPATIENT)
Dept: BEHAVIORAL HEALTH | Facility: CLINIC | Age: 69
End: 2024-06-10
Payer: MEDICARE

## 2024-06-10 DIAGNOSIS — Z79.899 LONG-TERM CURRENT USE OF BENZODIAZEPINE: ICD-10-CM

## 2024-06-10 DIAGNOSIS — F41.1 GAD (GENERALIZED ANXIETY DISORDER): ICD-10-CM

## 2024-06-10 RX ORDER — CLONAZEPAM 1 MG/1
1 TABLET ORAL 2 TIMES DAILY
Qty: 60 TABLET | Refills: 0 | Status: SHIPPED | OUTPATIENT
Start: 2024-06-10 | End: 2024-07-10

## 2024-06-10 NOTE — PROGRESS NOTES
Nonbillable note : patient called and requested a script ,she has rescheduled for July , missed the June appointment , will order one month and extend the time for the urine drug screen to be done within 30 days from today , staff to notify patient of script sent and need to obtain urine drug screen kpacer cns

## 2024-06-10 NOTE — PROGRESS NOTES
Nonbillable note : extended date of urine drug screen , staff to notify patient , she left a message she is scheduled for July , she missed her June appointment , was to have assistance with setting up virtual from a relative , I was unable to reach her on the day of her no show appointment and she just left me a message today saying she is scheduled for July and will be out of the clonazepam today . Kpacer cns

## 2024-06-18 ENCOUNTER — APPOINTMENT (OUTPATIENT)
Dept: BEHAVIORAL HEALTH | Facility: CLINIC | Age: 69
End: 2024-06-18
Payer: MEDICARE

## 2024-06-25 ENCOUNTER — APPOINTMENT (OUTPATIENT)
Dept: BEHAVIORAL HEALTH | Facility: CLINIC | Age: 69
End: 2024-06-25
Payer: MEDICARE

## 2024-06-26 ENCOUNTER — APPOINTMENT (OUTPATIENT)
Dept: PRIMARY CARE | Facility: CLINIC | Age: 69
End: 2024-06-26
Payer: MEDICARE

## 2024-07-01 ENCOUNTER — PRE-ADMISSION TESTING (OUTPATIENT)
Dept: PREADMISSION TESTING | Facility: HOSPITAL | Age: 69
End: 2024-07-01
Payer: MEDICARE

## 2024-07-01 VITALS
DIASTOLIC BLOOD PRESSURE: 74 MMHG | HEART RATE: 71 BPM | WEIGHT: 166.01 LBS | OXYGEN SATURATION: 94 % | TEMPERATURE: 98.2 F | SYSTOLIC BLOOD PRESSURE: 148 MMHG | BODY MASS INDEX: 26.06 KG/M2 | HEIGHT: 67 IN

## 2024-07-01 DIAGNOSIS — M47.12 SPONDYLOSIS, CERVICAL, WITH MYELOPATHY: ICD-10-CM

## 2024-07-01 DIAGNOSIS — R94.5 ABNORMAL RESULTS OF LIVER FUNCTION STUDIES: ICD-10-CM

## 2024-07-01 DIAGNOSIS — R79.9 ABNORMAL FINDING OF BLOOD CHEMISTRY, UNSPECIFIED: ICD-10-CM

## 2024-07-01 DIAGNOSIS — R79.1 ABNORMAL COAGULATION PROFILE: ICD-10-CM

## 2024-07-01 DIAGNOSIS — M48.02 DEGENERATIVE CERVICAL SPINAL STENOSIS: ICD-10-CM

## 2024-07-01 LAB
ABO GROUP (TYPE) IN BLOOD: NORMAL
ALBUMIN SERPL BCP-MCNC: 3.9 G/DL (ref 3.4–5)
ALP SERPL-CCNC: 83 U/L (ref 33–136)
ALT SERPL W P-5'-P-CCNC: 18 U/L (ref 7–45)
ANION GAP SERPL CALC-SCNC: 13 MMOL/L (ref 10–20)
ANION GAP SERPL CALC-SCNC: 14 MMOL/L (ref 10–20)
ANTIBODY SCREEN: NORMAL
APTT PPP: 29 SECONDS (ref 27–38)
AST SERPL W P-5'-P-CCNC: 15 U/L (ref 9–39)
BASOPHILS # BLD AUTO: 0.08 X10*3/UL (ref 0–0.1)
BASOPHILS NFR BLD AUTO: 0.7 %
BILIRUB SERPL-MCNC: 0.4 MG/DL (ref 0–1.2)
BUN SERPL-MCNC: 36 MG/DL (ref 6–23)
BUN SERPL-MCNC: 38 MG/DL (ref 6–23)
CALCIUM SERPL-MCNC: 9.6 MG/DL (ref 8.6–10.6)
CALCIUM SERPL-MCNC: 9.8 MG/DL (ref 8.6–10.6)
CHLORIDE SERPL-SCNC: 101 MMOL/L (ref 98–107)
CHLORIDE SERPL-SCNC: 101 MMOL/L (ref 98–107)
CO2 SERPL-SCNC: 29 MMOL/L (ref 21–32)
CO2 SERPL-SCNC: 29 MMOL/L (ref 21–32)
CREAT SERPL-MCNC: 0.45 MG/DL (ref 0.5–1.05)
CREAT SERPL-MCNC: 0.46 MG/DL (ref 0.5–1.05)
EGFRCR SERPLBLD CKD-EPI 2021: >90 ML/MIN/1.73M*2
EGFRCR SERPLBLD CKD-EPI 2021: >90 ML/MIN/1.73M*2
EOSINOPHIL # BLD AUTO: 0.05 X10*3/UL (ref 0–0.7)
EOSINOPHIL NFR BLD AUTO: 0.4 %
ERYTHROCYTE [DISTWIDTH] IN BLOOD BY AUTOMATED COUNT: 15.8 % (ref 11.5–14.5)
EST. AVERAGE GLUCOSE BLD GHB EST-MCNC: 146 MG/DL
GLUCOSE SERPL-MCNC: 133 MG/DL (ref 74–99)
GLUCOSE SERPL-MCNC: 135 MG/DL (ref 74–99)
HBA1C MFR BLD: 6.7 %
HCT VFR BLD AUTO: 45.8 % (ref 36–46)
HGB BLD-MCNC: 14.9 G/DL (ref 12–16)
IMM GRANULOCYTES # BLD AUTO: 0.03 X10*3/UL (ref 0–0.7)
IMM GRANULOCYTES NFR BLD AUTO: 0.3 % (ref 0–0.9)
INR PPP: 1.1 (ref 0.9–1.1)
LYMPHOCYTES # BLD AUTO: 1.31 X10*3/UL (ref 1.2–4.8)
LYMPHOCYTES NFR BLD AUTO: 11.4 %
MCH RBC QN AUTO: 34.2 PG (ref 26–34)
MCHC RBC AUTO-ENTMCNC: 32.5 G/DL (ref 32–36)
MCV RBC AUTO: 105 FL (ref 80–100)
MONOCYTES # BLD AUTO: 0.56 X10*3/UL (ref 0.1–1)
MONOCYTES NFR BLD AUTO: 4.9 %
NEUTROPHILS # BLD AUTO: 9.5 X10*3/UL (ref 1.2–7.7)
NEUTROPHILS NFR BLD AUTO: 82.3 %
NRBC BLD-RTO: 0 /100 WBCS (ref 0–0)
PLATELET # BLD AUTO: ABNORMAL 10*3/UL
POTASSIUM SERPL-SCNC: 4.5 MMOL/L (ref 3.5–5.3)
POTASSIUM SERPL-SCNC: 5 MMOL/L (ref 3.5–5.3)
PREALB SERPL-MCNC: 23.2 MG/DL (ref 18–40)
PROT SERPL-MCNC: 6.7 G/DL (ref 6.4–8.2)
PROTHROMBIN TIME: 12 SECONDS (ref 9.8–12.8)
RBC # BLD AUTO: 4.36 X10*6/UL (ref 4–5.2)
RH FACTOR (ANTIGEN D): NORMAL
SODIUM SERPL-SCNC: 138 MMOL/L (ref 136–145)
SODIUM SERPL-SCNC: 139 MMOL/L (ref 136–145)
WBC # BLD AUTO: 11.5 X10*3/UL (ref 4.4–11.3)

## 2024-07-01 PROCEDURE — 86900 BLOOD TYPING SEROLOGIC ABO: CPT

## 2024-07-01 PROCEDURE — 86923 COMPATIBILITY TEST ELECTRIC: CPT

## 2024-07-01 PROCEDURE — 83036 HEMOGLOBIN GLYCOSYLATED A1C: CPT

## 2024-07-01 PROCEDURE — 85610 PROTHROMBIN TIME: CPT

## 2024-07-01 PROCEDURE — 80053 COMPREHEN METABOLIC PANEL: CPT

## 2024-07-01 PROCEDURE — 36415 COLL VENOUS BLD VENIPUNCTURE: CPT

## 2024-07-01 PROCEDURE — 99205 OFFICE O/P NEW HI 60 MIN: CPT | Performed by: NURSE PRACTITIONER

## 2024-07-01 PROCEDURE — 84134 ASSAY OF PREALBUMIN: CPT

## 2024-07-01 PROCEDURE — 80048 BASIC METABOLIC PNL TOTAL CA: CPT | Mod: CCI

## 2024-07-01 PROCEDURE — 85025 COMPLETE CBC W/AUTO DIFF WBC: CPT

## 2024-07-01 PROCEDURE — 87081 CULTURE SCREEN ONLY: CPT

## 2024-07-01 PROCEDURE — 99406 BEHAV CHNG SMOKING 3-10 MIN: CPT | Performed by: NURSE PRACTITIONER

## 2024-07-01 RX ORDER — CHLORHEXIDINE GLUCONATE ORAL RINSE 1.2 MG/ML
SOLUTION DENTAL
Qty: 15 ML | Refills: 0 | Status: SHIPPED | OUTPATIENT
Start: 2024-07-01

## 2024-07-01 RX ORDER — CHLORHEXIDINE GLUCONATE 40 MG/ML
SOLUTION TOPICAL
Qty: 473 ML | Refills: 0 | Status: SHIPPED | OUTPATIENT
Start: 2024-07-01

## 2024-07-01 ASSESSMENT — ENCOUNTER SYMPTOMS
NECK PAIN: 1
ENDOCRINE NEGATIVE: 1
NECK STIFFNESS: 1
SKIN CHANGES: 1
NECK NEGATIVE: 1
CONSTITUTIONAL NEGATIVE: 1
BRUISES/BLEEDS EASILY: 1
ARTHRALGIAS: 1
MYALGIAS: 1
CARDIOVASCULAR NEGATIVE: 1
GASTROINTESTINAL NEGATIVE: 1
WEAKNESS: 1
EYES NEGATIVE: 1
SHORTNESS OF BREATH: 1

## 2024-07-01 ASSESSMENT — DUKE ACTIVITY SCORE INDEX (DASI)
CAN YOU DO MODERATE WORK AROUND THE HOUSE LIKE VACUUMING, SWEEPING FLOORS OR CARRYING GROCERIES: NO
CAN YOU PARTICIPATE IN MODERATE RECREATIONAL ACTIVITIES LIKE GOLF, BOWLING, DANCING, DOUBLES TENNIS OR THROWING A BASEBALL OR FOOTBALL: NO
CAN YOU DO HEAVY WORK AROUND THE HOUSE LIKE SCRUBBING FLOORS OR LIFTING AND MOVING HEAVY FURNITURE: NO
CAN YOU DO LIGHT WORK AROUND THE HOUSE LIKE DUSTING OR WASHING DISHES: YES
CAN YOU PARTICIPATE IN STRENOUS SPORTS LIKE SWIMMING, SINGLES TENNIS, FOOTBALL, BASKETBALL, OR SKIING: NO
CAN YOU HAVE SEXUAL RELATIONS: YES
CAN YOU DO YARD WORK LIKE RAKING LEAVES, WEEDING OR PUSHING A MOWER: NO
CAN YOU TAKE CARE OF YOURSELF (EAT, DRESS, BATHE, OR USE TOILET): YES
CAN YOU RUN A SHORT DISTANCE: NO
TOTAL_SCORE: 10.7
CAN YOU WALK INDOORS, SUCH AS AROUND YOUR HOUSE: NO
CAN YOU WALK A BLOCK OR TWO ON LEVEL GROUND: NO
CAN YOU CLIMB A FLIGHT OF STAIRS OR WALK UP A HILL: NO
DASI METS SCORE: 4.1

## 2024-07-01 ASSESSMENT — LIFESTYLE VARIABLES: SMOKING_STATUS: SMOKER

## 2024-07-01 NOTE — CPM/PAT H&P
CPM/PAT Evaluation       Name: Sweta Lim (Sweta Lim)  /Age: 1955/68 y.o.     In-Person       Chief Complaint: Neck pain    HPI    Patient is a 68 year old female who presents to the PAT clinic for risk stratification regarding upcoming C4-5 anterior cervical discectomy. Patient has a PMHx of chronic progressive neck pain and balance/gait dysfunction.     Past Medical History:   Diagnosis Date   • Acute frontal sinusitis, unspecified 2013    Acute frontal sinusitis   • Anxiety    • Asthma ([facility])    • Cervical disc disease    • Chronic pain disorder    • COPD (chronic obstructive pulmonary disease) (Multi)    • Depression    • Dysphagia    • Encounter for immunization 11/10/2020    Need for DTP vaccine   • GERD (gastroesophageal reflux disease)    • HL (hearing loss)     BL hearing aids   • Hyperlipidemia    • Hypothyroidism    • Personal history of other diseases of the musculoskeletal system and connective tissue     History of low back pain   • Personal history of other diseases of the nervous system and sense organs     History of sleep apnea   • Personal history of other diseases of the respiratory system     Personal history of asthma   • Personal history of other endocrine, nutritional and metabolic disease     History of diabetes mellitus   • Restless legs syndrome     Restless legs syndrome   • Sialoadenitis, unspecified 2021    Submandibular sialoadenitis   • Sialoadenitis, unspecified 2021    Submandibular sialoadenitis   • Sleep apnea    • Type 2 diabetes mellitus (Multi)    • Vertigo    • Vision loss     Glasses       Past Surgical History:   Procedure Laterality Date   •  SECTION, CLASSIC  2016     Section   • LUMBAR FUSION  2016    Lumbar Vertebral Fusion   • NECK SURGERY  10/19/2012    Neck Surgery   • OTHER SURGICAL HISTORY  2016    Treatment Of Fracture Of The Humerus       Patient  has no history on file for sexual  "activity.    Family History   Problem Relation Name Age of Onset   • Hip fracture Mother     • Dementia Mother     • Hypertension Mother     • Cancer Father     • Cancer Maternal Grandmother     • Diabetes Paternal Grandmother     • Hypertension Other Grandparent    • Diabetes Other         Allergies   Allergen Reactions   • Codeine Nausea Only       Prior to Admission medications    Medication Sig Start Date End Date Taking? Authorizing Provider   albuterol 90 mcg/actuation inhaler Inhale 2 puffs 1-2 minutes apart every 4 hours as needed for cough, wheezing, shortness of breath, or prior to exercise.    Historical Provider, MD   alendronate (Fosamax) 70 mg tablet Take 1 tablet (70 mg) by mouth every 7 days. BEFORE FIRST FOOD, DRINK OR MEDICINE OF THE DAY. DISSOLVE IN 4OZ OF WATER 6/7/23   Historical Provider, MD   aspirin 81 mg chewable tablet Chew 1 tablet (81 mg) once daily. 4/16/24 4/16/25  RENETTA Fox-CNP   BD Stefanie 2nd Gen Pen Needle 32 gauge x 5/32\" needle 1 Needle once daily. 5/26/23   Historical Provider, MD   busPIRone (Buspar) 10 mg tablet 2 tablets each morning and 1 tablet each afternoon and 2 tablets each afternoon 3/26/24   Jazmin M RENETTA Yepez-CNS   clonazePAM (KlonoPIN) 1 mg tablet Take 1 tablet (1 mg) by mouth 2 times a day. Has appointment in July 6/10/24 7/10/24  Jazmin  RENETTA Yepez-CNS   colchicine 0.6 mg tablet Take 1 tablet (0.6 mg) by mouth once daily.    Historical Provider, MD   escitalopram (Lexapro) 20 mg tablet Take 1 tablet (20 mg) by mouth once daily.  Patient not taking: Reported on 5/28/2024 3/26/24 6/24/24  [facility] RENETTA Yepez-CNS   ezetimibe (Zetia) 10 mg tablet Take 1 tablet (10 mg) by mouth once daily. 8/26/16   Historical Provider, MD   folic acid (Folvite) 1 mg tablet Take 1 tablet (1 mg) by mouth once daily. 5/21/20   Historical Provider, MD   FreeStyle Lancets 28 gauge 1 each once daily. 6/14/23   Historical Provider, MD   FreeStyle Test strip Check glucose twice daily. " May dispense formulary equivalent. Dx: IDDM E11.9 5/3/19   Historical Provider, MD   gabapentin (Neurontin) 300 mg capsule Take 1 capsule (300 mg) by mouth 2 times a day. 10/5/22   Historical Provider, MD   insulin degludec (Tresiba FlexTouch) 100 unit/mL (3 mL) injection Inject 30 units SC once daily at bedtime. Adjust by 2 units every 4 days to achieve fasting glucose < 150, and glucose never lower than 100. 4/29/19   Historical Provider, MD   levothyroxine (Synthroid, Levoxyl) 75 mcg tablet Take 1 tablet (75 mcg) by mouth once daily.    Historical Provider, MD   metFORMIN XR (Glucophage-XR) 500 mg 24 hr tablet Take 3 tablets (1,500 mg) by mouth once daily. 4/24/18   Historical Provider, MD   methocarbamol (Robaxin) 750 mg tablet Take 1 tablet (750 mg) by mouth every 4 hours. 9/18/19   Historical Provider, MD   methotrexate (Trexall) 2.5 mg tablet TAKE 6 TABLET Weekly 5/22/20   Historical Provider, MD   nicotine (Nicoderm CQ) 7 mg/24 hr patch Place 1 patch over 24 hours on the skin once every 24 hours. 2/5/24 6/4/24  LUIGI Chan   nicotine polacrilex (Commit) 4 mg lozenge Dissolve 1 lozenge (4 mg) in the mouth every 2 hours if needed for smoking cessation. 10/12/23 11/11/23  Nelson Brown DO   nicotine polacrilex (Nicorette) 2 mg lozenge Use 1 lozenge (2 mg) in the mouth or throat every 2 hours if needed for smoking cessation. 2/5/24 5/15/24  LUIGI Chan   omeprazole (PriLOSEC) 40 mg DR capsule TAKE 1 CAPSULE BY MOUTH TWICE A DAY 2/9/24   Raza Sullivan MD   polyethylene glycol (Glycolax, Miralax) 17 gram/dose powder Take 17 g by mouth twice a day. IN 8 OUNCES OF WATER AND DRINK 8/4/23   Historical Provider, MD   primidone (Mysoline) 50 mg tablet Take 2 tablets (100 mg) by mouth 2 times a day. 11/14/16   Historical Provider, MD   sennosides (senna) 8.6 mg capsule Take 1 capsule (8.6 mg) by mouth 2 times a day.  Patient not taking: Reported on 5/28/2024 10/16/23 10/10/24  Raza  MD Nate   simvastatin (Zocor) 40 mg tablet TAKE 1 TABLET BY MOUTH EVERY DAY  Patient not taking: Reported on 5/28/2024 2/8/24   LUIGI Chan   traZODone (Desyrel) 50 mg tablet TAKE 1 -2 TABLETS AT BEDTIME 1/11/24   ADRY Hathaway   Trelegy Ellipta 100-62.5-25 mcg blister with device INHALE 1 DOSE ORALLY EVERY DAY (RINSE MOUTH AFTER USE) 5/3/24   Historical Provider, MD   triamcinolone (Kenalog) 0.1 % cream Apply topically 2 times a day. Use no longer than 2wks. 5/16/24   LUIGI Chan        PAT ROS     PAT Physical Exam     PAT AIRWAY:   Airway:     Mallampati::  III    TM distance::  >3 FB    Neck ROM::  Limited   Dental disease      Visit Vitals  /74   Pulse 71   Temp 36.8 °C (98.2 °F) (Temporal)       DASI Risk Score      Flowsheet Row Most Recent Value   DASI SCORE 5.45   METS Score (Will be calculated only when all the questions are answered) 3.4          Caprini DVT Assessment      Flowsheet Row Most Recent Value   DVT Score 9   Current Status COPD, Major surgery planned, lasting 2-3 hours   History Prior major surgery, COPD   Age 60-75 years   BMI 30 or less          Modified Frailty Index      Flowsheet Row Most Recent Value   Modified Frailty Index Calculator .4545          CHADS2 Stroke Risk  Current as of today        N/A 3 to 100%: High Risk   2 to < 3%: Medium Risk   0 to < 2%: Low Risk     Last Change: N/A          This score determines the patient's risk of having a stroke if the patient has atrial fibrillation.        This score is not applicable to this patient. Components are not calculated.          Revised Cardiac Risk Index      Flowsheet Row Most Recent Value   Revised Cardiac Risk Calculator 2          Apfel Simplified Score      Flowsheet Row Most Recent Value   Apfel Simplified Score Calculator 2          Risk Analysis Index Results This Encounter    No data found in the last 1 encounters.       Stop Bang Score      Flowsheet Row Most Recent  Value   Do you snore loudly? 1   Do you often feel tired or fatigued after your sleep? 1   Has anyone ever observed you stop breathing in your sleep? 1   Do you have or are you being treated for high blood pressure? 0   Recent BMI (Calculated) 26.6   Is BMI greater than 35 kg/m2? 0=No   Age older than 50 years old? 1=Yes   Is your neck circumference greater than 17 inches (Male) or 16 inches (Female)? 0   Gender - Male 0=No   STOP-BANG Total Score 4          Anesthesia:  The patient denies problems with anesthesia in the past such as PONV, prolonged sedation, awareness, dental damage, aspiration, cardiac arrest, difficult intubation, or unexpected hospital admissions.     Neuro:   The patient has diagnoses or significant findings on chart review or clinical presentation and evaluation significant for gait imbalance. The patient is at increased risk for postoperative delirium secondary to age 65 or older, depression, polypharmacy. The patient is at increased risk for perioperative stroke secondary to cardiac disease, hypertension , carotid stenosis, increased age, female gender, diabetes mellitus, general anesthesia. Handouts for preoperative brain exercises given to patient.    HEENT/Airway  The patient has diagnoses, significant findings on chart review, clinical presentation or evaluation of limited neck extension, prior neck surgery, fusion/fixation.    Cardiovascular  The patient is scheduled for non-cardiac surgery associated with elevated risk. The patient has major cardiac history.  RCRI  {TWRCRI:28313}  METS  The patient's functional capacity capacity is {TWMETS:66015}  EKG  {twekgassess:02267}  Heart Failure  The patient has no known history of heart failure.  However, the patient demonstrates signs and/or symptoms of heart failure with no prior diagnosis to account for these findings.  Therefore, an echocardiogram is ordered for further evaluation.  Hypertension Evaluation  The patient has a known history  of hypertension that is controlled.  Patient's hypertension is most consistent with stage 1.  Heart Rhythm Evaluation  The patient has no history of arrhythmias.  Heart Valve Evaluation  The patient has no known history of valvular heart disease. The patient has no symptoms or physical exam findings to suggest valvular heart disease.  Cardiology Evaluation  The patient follows with cardiology,  ***. Patient was last seen ***. Per note, ***.    The patient has a 30-day risk for MACE of {TWMACERISK:82718}.  SAHIL score which indicates a ***% risk of intraoperative or 30-day postoperative.    Pulmonary   The patient has findings on chart review, clinical presentation and evaluation significant for COPD, DEBRA. The patient is at increased risk of perioperative pulmonary complications secondary to neck surgery, DEBRA, advanced age greater than 60. Patient educated to bring CPAP/BIPAP day of surgery. Recommend prioritizing non opioid analgesic techniques (regional and local anesthesia, nonsteroidal medications, etc) before the administration of opioids and close monitoring for hypoventilation after surgery due to DEBRA/supsected DEBRA. If intravenous narcotics are needed beyond the immediate rochelle-operative period, the patient may benefit from continuous pulse oximetry to monitor for hypoxic events till baseline Sp02 is normal on room air and  a respiratory therapy evaluation.    The patient has a stop bang score of ***, which places patient at {twprodigy:11253:s} risk for having DEBRA.    ARISCAT ***, {TWARISCATRISK:42260}  PRODIGY ***, {twprodigy:65971} risk of respiratory depression episode. Patient given PI sheet for preoperative deep breathing exercises.    Hematology  No diagnoses or significant findings on chart review or clinical presentation and evaluation.  Antiplatelet management   The patient is currently receiving antiplatelet therapy for CAD.  Anticoagulation management  The patient is not currently receiving  anticoagulation therapy.    Caprini score ***, {twprodigy:32472} risk of perioperative VTE.     Patient instructed to ambulate as soon as possible postoperatively to decrease thromboembolic risk. Initiate mechanical DVT prophylaxis as soon as possible and initiate chemical prophylaxis when deemed safe from a bleeding standpoint post surgery.     Transfusion Evaluation  A type and screen was obtained given the likelihood for perioperative transfusion of blood or blood products.    Gastrointestinal  The patient has diagnoses or significant findings on chart review or clinical presentation and evaluation significant for GERD, dysphagia    Eat 10- 17,  self-perceived oropharyngeal dysphagia scale (0-40)     Genitourinary  No diagnoses or significant findings on chart review or clinical presentation and evaluation.    Renal  The patient has no known history of chronic kidney disease. Preventative measures include preoperative hydration.    Musculoskeletal  The patient has diagnoses or significant findings on chart review or clinical presentation and evaluation significant for Osteopoosis    Endocrine  Diabetes Evaluation  The patient has a history of diabetes mellitus that currently appears controlled.  Thyroid Disease Evaluation  The patient has a history of thyroid disease that appears controlled.    ID  No diagnoses or significant findings on chart review or clinical presentation and evaluation.    -Preoperative medication instructions were provided and reviewed with the patient.  Any additional testing or evaluation was explained to the patient.  NPO Instructions were discussed, and the patient's questions were answered prior to conclusion of this encounter.

## 2024-07-01 NOTE — PREPROCEDURE INSTRUCTIONS
Fasting Guidelines    NPO Instructions:    Do not eat any food after midnight the night before your surgery/procedure.  You may have up to 13.5 ounces of clear liquids until TWO hours before your instructed arrival time to the hospital. This includes water, black tea/coffee, (no milk or cream), apple juice, and/or electrolyte drinks (Gatorade).  You may chew gum up to TWO hours before your surgery/procedure.    Additional Instructions:    Avoid herbal supplements, multivitamins and NSAIDS (non-steroidal anti-inflammatory drugs) such as Advil, Aleve, Ibuprofen, Naproxen, Excedrin, Meloxicam or Celebrex for at least 7 days prior to surgery. May take Tylenol as needed.    Avoid tobacco and alcohol products for 24 hours prior to surgery.    CONTACT SURGEON'S OFFICE IF YOU DEVELOP:  * Fever = 100.4 F   * New respiratory symptoms (e.g. cough, shortness of breath, respiratory distress, sore throat)  * Recent loss of taste or smell  *Flu like symptoms such as headache, fatigue or gastrointestinal symptoms  * You develop any open sores, shingles, burning or painful urination   AND/OR:  * You no longer wish to have the surgery.  * Any other personal circumstances change that may lead to the need to cancel or defer this surgery.  *You were admitted to any hospital within one week of your planned procedure.    Seven/Six Days before Surgery:  Review your medication instructions, stop indicated medications    Day of Surgery:  Review your medication instructions, take indicated medications  Wear comfortable loose fitting clothing  Do not use moisturizers, creams, lotions or perfume  All jewelry and valuables should be left at home    Michael Zheng CNP  [facility]  [phone]  [phone]  Email-[email or web address]    [facility]  [phone]       Preoperative Brain Exercises    What are brain exercises?  A brain exercise is any activity that  engages your thinking (cognitive) skills.    What types of activities are considered brain exercises?  Jigsaw puzzles, crossword puzzles, word jumble, memory games, word search, and many more.  Many can be found free online or on your phone via a mobile beto.    Why should I do brain exercises before my surgery?  More recent research has shown brain exercise before surgery can lower the risk of postoperative delirium (confusion) which can be especially important for older adults.  Patients who did brain exercises for 5 to 10 hours the days before surgery, cut their risk of postoperative delirium in half up to 1 week after surgery.         [facility]    Preoperative Deep Breathing Exercises    Why it is important to do deep breathing exercises before my surgery?  Deep breathing exercises strengthen your breathing muscles.  This helps you to recover after your surgery and decreases the chance of breathing complications.      How are the deep breathing exercises done?  Sit straight with your back supported.  Breathe in deeply and slowly through your nose. Your lower rib cage should expand and your abdomen may move forward.  Hold that breath for 3 to 5 seconds.  Breathe out through pursed lips, slowly and completely.  Rest and repeat 10 times every hour while awake.  Rest longer if you become dizzy or lightheaded.         Patient and Family Education             Ways You Can Help Prevent Blood Clots             This handout explains some simple things you can do to help prevent blood clots.      Blood clots are blockages that can form in the body's veins. When a blood clot forms in your deep veins, it may be called a deep vein thrombosis, or DVT for short. Blood clots can happen in any part of the body where blood flows, but they are most common in the arms and legs. If a piece of a blood clot breaks free and travels to the lungs, it is called a pulmonary embolus (PE). A PE can be a very  serious problem.         Being in the hospital or having surgery can raise your chances of getting a blood clot because you may not be well enough to move around as much as you normally do.         Ways you can help prevent blood clots in the hospital         Wearing SCDs. SCDs stands for Sequential Compression Devices.   SCDs are special sleeves that wrap around your legs  They attach to a pump that fills them with air to gently squeeze your legs every few minutes.   This helps return the blood in your legs to your heart.   SCDs should only be taken off when walking or bathing.   SCDs may not be comfortable, but they can help save your life.               Wearing compression stockings - if your doctor orders them. These special snug fitting stockings gently squeeze your legs to help blood flow.       Walking. Walking helps move the blood in your legs.   If your doctor says it is ok, try walking the halls at least   5 times a day. Ask us to help you get up, so you don't fall.      Taking any blood thinning medicines your doctor orders.        Page 1 of 2     [facility]; 3/23   Ways you can help prevent blood clots at home       Wearing compression stockings - if your doctor orders them. ? Walking - to help move the blood in your legs.       Taking any blood thinning medicines your doctor orders.      Signs of a blood clot or PE      Tell your doctor or nurse know right away if you have of the problems listed below.    If you are at home, seek medical care right away. Call 911 for chest pain or problems breathing.               Signs of a blood clot (DVT) - such as pain,  swelling, redness or warmth in your arm or leg      Signs of a pulmonary embolism (PE) - such as chest     pain or feeling short of breath

## 2024-07-01 NOTE — CPM/PAT H&P
CPM/PAT Evaluation       Name: Sweta Lim (Sweta Lim)  /Age: 1955/68 y.o.     Visit Type:   In-Person       Chief Complaint: neck pain, preop eval     HPI  The patient is a 68 year old female with  chronic progressive neck pain and balance/gait dysfunction. She has a history of a prior C5-6 ACDF. Her past imaging has shown a large left paracentric disc bulge at C4-5 resulting in severe central stenosis. She also had a right paracentric disc bulge at C6-7 resulting in severe right foraminal stenosis. She has failed medical management and wishes to proceed with surgical intervention. She presents today for perioperative evaluation in anticipation of C4-5 Anterior cervical disectomy fusion on 24 with Dr. Crump.    Past Medical History:   Diagnosis Date    Acute frontal sinusitis, unspecified 2013    Acute frontal sinusitis    Anxiety     Asthma ([facility])     Cervical disc disease     Chronic pain disorder     COPD (chronic obstructive pulmonary disease) (Multi)     Depression     Dysphagia     Encounter for immunization 11/10/2020    Need for DTP vaccine    GERD (gastroesophageal reflux disease)     HL (hearing loss)     BL hearing aids    Hyperlipidemia     Hypothyroidism     Personal history of other diseases of the musculoskeletal system and connective tissue     History of low back pain    Personal history of other diseases of the nervous system and sense organs     History of sleep apnea    Personal history of other diseases of the respiratory system     Personal history of asthma    Personal history of other endocrine, nutritional and metabolic disease     History of diabetes mellitus    Restless legs syndrome     Restless legs syndrome    Sialoadenitis, unspecified 2021    Submandibular sialoadenitis    Sialoadenitis, unspecified 2021    Submandibular sialoadenitis    Sleep apnea     Type 2 diabetes mellitus (Multi)     Vertigo     Vision loss     Glasses       Past  "Surgical History:   Procedure Laterality Date     SECTION, CLASSIC  2016     Section    LUMBAR FUSION  2016    Lumbar Vertebral Fusion    NECK SURGERY  10/19/2012    Neck Surgery    OTHER SURGICAL HISTORY  2016    Treatment Of Fracture Of The Humerus       Patient  has no history on file for sexual activity.    Family History   Problem Relation Name Age of Onset    Hip fracture Mother      Dementia Mother      Hypertension Mother      Cancer Father      Cancer Maternal Grandmother      Diabetes Paternal Grandmother      Hypertension Other Grandparent     Diabetes Other         Allergies   Allergen Reactions    Codeine Nausea Only       Prior to Admission medications    Medication Sig Start Date End Date Taking? Authorizing Provider   albuterol 90 mcg/actuation inhaler Inhale 2 puffs 1-2 minutes apart every 4 hours as needed for cough, wheezing, shortness of breath, or prior to exercise.    Historical Provider, MD   alendronate (Fosamax) 70 mg tablet Take 1 tablet (70 mg) by mouth every 7 days. BEFORE FIRST FOOD, DRINK OR MEDICINE OF THE DAY. DISSOLVE IN 4OZ OF WATER 23   Historical Provider, MD   aspirin 81 mg chewable tablet Chew 1 tablet (81 mg) once daily. 24  RENETTA Fox-CNP   BD Stefanie 2nd Gen Pen Needle 32 gauge x \" needle 1 Needle once daily. 23   Historical Provider, MD   busPIRone (Buspar) 10 mg tablet 2 tablets each morning and 1 tablet each afternoon and 2 tablets each afternoon 3/26/24   RENETTA Hathaway-CNS   clonazePAM (KlonoPIN) 1 mg tablet Take 1 tablet (1 mg) by mouth 2 times a day. Has appointment in July 6/10/24 7/10/24  ADRY Hathaway   colchicine 0.6 mg tablet Take 1 tablet (0.6 mg) by mouth once daily.    Historical Provider, MD   escitalopram (Lexapro) 20 mg tablet Take 1 tablet (20 mg) by mouth once daily.  Patient not taking: Reported on 2024 3/26/24 6/24/24  RENETTA Hathaway-CNS   ezetimibe (Zetia) 10 mg " tablet Take 1 tablet (10 mg) by mouth once daily. 8/26/16   Historical Provider, MD   folic acid (Folvite) 1 mg tablet Take 1 tablet (1 mg) by mouth once daily. 5/21/20   Historical Provider, MD Orryle Lancets 28 gauge 1 each once daily. 6/14/23   Historical Provider, MD   FreeStyle Test strip Check glucose twice daily. May dispense formulary equivalent. Dx: IDDM E11.9 5/3/19   Historical Provider, MD   gabapentin (Neurontin) 300 mg capsule Take 1 capsule (300 mg) by mouth 2 times a day. 10/5/22   Historical Provider, MD   insulin degludec (Tresiba FlexTouch) 100 unit/mL (3 mL) injection Inject 30 units SC once daily at bedtime. Adjust by 2 units every 4 days to achieve fasting glucose < 150, and glucose never lower than 100. 4/29/19   Historical Provider, MD   levothyroxine (Synthroid, Levoxyl) 75 mcg tablet Take 1 tablet (75 mcg) by mouth once daily.    Historical Provider, MD   metFORMIN XR (Glucophage-XR) 500 mg 24 hr tablet Take 3 tablets (1,500 mg) by mouth once daily. 4/24/18   Historical Provider, MD   methocarbamol (Robaxin) 750 mg tablet Take 1 tablet (750 mg) by mouth every 4 hours. 9/18/19   Historical Provider, MD   methotrexate (Trexall) 2.5 mg tablet TAKE 6 TABLET Weekly 5/22/20   Historical Provider, MD   nicotine (Nicoderm CQ) 7 mg/24 hr patch Place 1 patch over 24 hours on the skin once every 24 hours. 2/5/24 6/4/24  LUIGI Chan   nicotine polacrilex (Commit) 4 mg lozenge Dissolve 1 lozenge (4 mg) in the mouth every 2 hours if needed for smoking cessation. 10/12/23 11/11/23  Nelson Brown DO   nicotine polacrilex (Nicorette) 2 mg lozenge Use 1 lozenge (2 mg) in the mouth or throat every 2 hours if needed for smoking cessation. 2/5/24 5/15/24  LUIGI Chan   omeprazole (PriLOSEC) 40 mg DR capsule TAKE 1 CAPSULE BY MOUTH TWICE A DAY 2/9/24   Raza Sullivan MD   polyethylene glycol (Glycolax, Miralax) 17 gram/dose powder Take 17 g by mouth twice a day. IN 8 OUNCES  OF WATER AND DRINK 8/4/23   Historical Provider, MD   primidone (Mysoline) 50 mg tablet Take 2 tablets (100 mg) by mouth 2 times a day. 11/14/16   Historical Provider, MD   sennosides (senna) 8.6 mg capsule Take 1 capsule (8.6 mg) by mouth 2 times a day.  Patient not taking: Reported on 5/28/2024 10/16/23 10/10/24  Raza Sullivan MD   simvastatin (Zocor) 40 mg tablet TAKE 1 TABLET BY MOUTH EVERY DAY  Patient not taking: Reported on 5/28/2024 2/8/24   RENETTA Chan-PAMELA   traZODone (Desyrel) 50 mg tablet TAKE 1 -2 TABLETS AT BEDTIME 1/11/24   ADRY Hathaway   Trelegy Ellipta 100-62.5-25 mcg blister with device INHALE 1 DOSE ORALLY EVERY DAY (RINSE MOUTH AFTER USE) 5/3/24   Historical Provider, MD   triamcinolone (Kenalog) 0.1 % cream Apply topically 2 times a day. Use no longer than 2wks. 5/16/24   LUIGI Chan        PAT ROS:   Constitutional:   neg    Neuro/Psych:    weakness  Eyes:   neg    Ears:   neg    Nose:   neg    Mouth:   neg    Throat:   neg    Neck:   neg     neck pain   neck stiffness  Cardio:   neg    Respiratory:    shortness of breath (intermittent)  Endocrine:   neg    GI:   neg    :   neg    Musculoskeletal:    RLS   arthralgias   myalgias  Hematologic:   neg     bruises/bleeds easily  Skin:  neg     skin changes (ecchymosis b/l arms)      Physical Exam  Vitals reviewed.   Constitutional:       Appearance: Normal appearance.   HENT:      Head: Normocephalic and atraumatic.      Nose: Nose normal.      Mouth/Throat:      Mouth: Mucous membranes are moist.      Pharynx: Oropharynx is clear.   Eyes:      Extraocular Movements: Extraocular movements intact.      Conjunctiva/sclera: Conjunctivae normal.      Pupils: Pupils are equal, round, and reactive to light.   Cardiovascular:      Rate and Rhythm: Normal rate and regular rhythm.      Pulses: Normal pulses.      Heart sounds: Normal heart sounds.   Pulmonary:      Effort: Pulmonary effort is normal.      Breath  "sounds: Normal breath sounds.   Musculoskeletal:         General: Normal range of motion.      Cervical back: Normal range of motion.   Skin:     General: Skin is warm and dry.   Neurological:      General: No focal deficit present.      Mental Status: She is alert and oriented to person, place, and time.   Psychiatric:         Mood and Affect: Mood normal.         Behavior: Behavior normal.          PAT AIRWAY:   Airway:     Mallampati::  IV    TM distance::  >3 FB    Neck ROM::  Full  normal        Visit Vitals  /74   Pulse 71   Temp 36.8 °C (98.2 °F) (Temporal)   Ht 1.702 m (5' 7\")   Wt 75.3 kg (166 lb 0.1 oz)   SpO2 94%   BMI 26.00 kg/m²   OB Status Unknown   Smoking Status Every Day   BSA 1.89 m²          DASI Risk Score      Flowsheet Row Most Recent Value   DASI SCORE 10.7   METS Score (Will be calculated only when all the questions are answered) 4.1          Caprini DVT Assessment      Flowsheet Row Most Recent Value   DVT Score 9   Current Status COPD, Major surgery planned, lasting 2-3 hours   History Prior major surgery, COPD   Age 60-75 years   BMI 30 or less          Modified Frailty Index      Flowsheet Row Most Recent Value   Modified Frailty Index Calculator .[id number]          CHADS2 Stroke Risk  Current as of 41 minutes ago        N/A 3 to 100%: High Risk   2 to < 3%: Medium Risk   0 to < 2%: Low Risk     Last Change: N/A          This score determines the patient's risk of having a stroke if the patient has atrial fibrillation.        This score is not applicable to this patient. Components are not calculated.          Revised Cardiac Risk Index      Flowsheet Row Most Recent Value   Revised Cardiac Risk Calculator 1          Apfel Simplified Score      Flowsheet Row Most Recent Value   Apfel Simplified Score Calculator 2          Risk Analysis Index Results This Encounter    No data found in the last 1 encounters.       Stop Bang Score      Flowsheet Row Most Recent Value   Do you snore loudly? " 1   Do you often feel tired or fatigued after your sleep? 1   Has anyone ever observed you stop breathing in your sleep? 1   Do you have or are you being treated for high blood pressure? 0   Recent BMI (Calculated) 26.6   Is BMI greater than 35 kg/m2? 0=No   Age older than 50 years old? 1=Yes   Is your neck circumference greater than 17 inches (Male) or 16 inches (Female)? 0   Gender - Male 0=No   STOP-BANG Total Score 4          Recent Results (from the past 168 hour(s))   Basic Metabolic Panel    Collection Time: 07/01/24  2:34 PM   Result Value Ref Range    Glucose 135 (H) 74 - 99 mg/dL    Sodium 139 136 - 145 mmol/L    Potassium 5.0 3.5 - 5.3 mmol/L    Chloride 101 98 - 107 mmol/L    Bicarbonate 29 21 - 32 mmol/L    Anion Gap 14 10 - 20 mmol/L    Urea Nitrogen 36 (H) 6 - 23 mg/dL    Creatinine 0.46 (L) 0.50 - 1.05 mg/dL    eGFR >90 >60 mL/min/1.73m*2    Calcium 9.8 8.6 - 10.6 mg/dL   Prealbumin    Collection Time: 07/01/24  2:34 PM   Result Value Ref Range    Prealbumin 23.2 18.0 - 40.0 mg/dL   CBC and Auto Differential    Collection Time: 07/01/24  2:34 PM   Result Value Ref Range    WBC 11.5 (H) 4.4 - 11.3 x10*3/uL    nRBC 0.0 0.0 - 0.0 /100 WBCs    RBC 4.36 4.00 - 5.20 x10*6/uL    Hemoglobin 14.9 12.0 - 16.0 g/dL    Hematocrit 45.8 36.0 - 46.0 %     (H) 80 - 100 fL    MCH 34.2 (H) 26.0 - 34.0 pg    MCHC 32.5 32.0 - 36.0 g/dL    RDW 15.8 (H) 11.5 - 14.5 %    Platelets      Neutrophils % 82.3 40.0 - 80.0 %    Immature Granulocytes %, Automated 0.3 0.0 - 0.9 %    Lymphocytes % 11.4 13.0 - 44.0 %    Monocytes % 4.9 2.0 - 10.0 %    Eosinophils % 0.4 0.0 - 6.0 %    Basophils % 0.7 0.0 - 2.0 %    Neutrophils Absolute 9.50 (H) 1.20 - 7.70 x10*3/uL    Immature Granulocytes Absolute, Automated 0.03 0.00 - 0.70 x10*3/uL    Lymphocytes Absolute 1.31 1.20 - 4.80 x10*3/uL    Monocytes Absolute 0.56 0.10 - 1.00 x10*3/uL    Eosinophils Absolute 0.05 0.00 - 0.70 x10*3/uL    Basophils Absolute 0.08 0.00 - 0.10 x10*3/uL    Type And Screen    Collection Time: 07/01/24  2:34 PM   Result Value Ref Range    ABO TYPE O     Rh TYPE POS     ANTIBODY SCREEN NEG    Coagulation Screen    Collection Time: 07/01/24  2:50 PM   Result Value Ref Range    Protime 12.0 9.8 - 12.8 seconds    INR 1.1 0.9 - 1.1    aPTT 29 27 - 38 seconds   Staphylococcus aureus/MRSA colonization, Culture    Collection Time: 07/01/24  2:50 PM    Specimen: Anterior Nares; Swab   Result Value Ref Range    Staph/MRSA Screen Culture No Staphylococcus aureus isolated    Comprehensive Metabolic Panel    Collection Time: 07/01/24  2:50 PM   Result Value Ref Range    Glucose 133 (H) 74 - 99 mg/dL    Sodium 138 136 - 145 mmol/L    Potassium 4.5 3.5 - 5.3 mmol/L    Chloride 101 98 - 107 mmol/L    Bicarbonate 29 21 - 32 mmol/L    Anion Gap 13 10 - 20 mmol/L    Urea Nitrogen 38 (H) 6 - 23 mg/dL    Creatinine 0.45 (L) 0.50 - 1.05 mg/dL    eGFR >90 >60 mL/min/1.73m*2    Calcium 9.6 8.6 - 10.6 mg/dL    Albumin 3.9 3.4 - 5.0 g/dL    Alkaline Phosphatase 83 33 - 136 U/L    Total Protein 6.7 6.4 - 8.2 g/dL    AST 15 9 - 39 U/L    Bilirubin, Total 0.4 0.0 - 1.2 mg/dL    ALT 18 7 - 45 U/L   Hemoglobin A1C    Collection Time: 07/01/24  2:50 PM   Result Value Ref Range    Hemoglobin A1C 6.7 (H) see below %    Estimated Average Glucose 146 Not Established mg/dL        Diagnostic Results           [facility] US CAROTID ARTERY DUPLEX BILATERAL  5/21/24  CONCLUSIONS:  Right Carotid: Findings are consistent with greater than 70% stenosis of the right proximal internal carotid artery. Turbulent flow seen by color Doppler. Right external carotid artery appears patent with no evidence of stenosis. The right vertebral artery is patent with antegrade flow. No evidence of hemodynamically significant stenosis in the right subclavian artery.  Left Carotid: Findings are consistent with less than 50% stenosis of the left proximal internal carotid artery. Laminar flow seen by color Doppler. There are elevated  velocities in the left ECA that are suggestive of disease. The left vertebral artery is patent with antegrade flow. No evidence of hemodynamically significant stenosis in the left subclavian artery.    VASC US CAROTID ARTERY DUPLEX BILATERAL  11/27/23  CONCLUSIONS:  Right Carotid: Findings are consistent with 50 to 69% stenosis of the right proximal internal carotid artery. Turbulent flow seen by color Doppler. Right external carotid artery appears patent with no evidence of stenosis. The right vertebral artery is patent with antegrade flow. No evidence of hemodynamically significant stenosis in the right subclavian artery.  Left Carotid: Findings are consistent with less than 50% stenosis of the left proximal internal carotid artery. Turbulent flow seen by color Doppler. There are elevated velocities in the left ECA that are suggestive of disease. The left vertebral artery is patent with antegrade flow. No evidence of hemodynamically significant stenosis in the left subclavian artery.     5/10/23 ECHO  1. Left ventricular systolic function is normal with a 55-60% estimated ejection fraction.  2. Aortic valve sclerosis.    Assessment and Plan:     Anesthesia:  The patient denies problems with anesthesia in the past such as PONV, prolonged sedation, awareness, dental damage, aspiration, cardiac arrest, difficult intubation, or unexpected hospital admissions.     Neuro:   The patient has diagnoses or significant findings on chart review or clinical presentation and evaluation significant for recurrent depression, anxiety followed by behavior health. Managed on escitalopram, trazodone. Followed by behavioral health with no current complaints. The patient is at increased risk for postoperative delirium secondary to age 65 or older, depression, polypharmacy. The patient is at increased risk for perioperative stroke secondary to carotid stenosis, increased age, hyperlipidemia, female gender, diabetes mellitus, general  anesthesia, operative time >2.5 hours. Handouts for preoperative brain exercises given to patient.    HEENT/Airway  No diagnoses, significant findings on chart review, clinical presentation, or evaluation. No documented or reported history of airway difficulty.     Cardiovascular  The patient is scheduled for non-cardiac surgery associated with elevated risk. The patient has no major cardiac contraindications to non- cardiac surgery.  RCRI  The patient meets 0-1 RCRI criteria and therefore has a less than 1% risk of major adverse cardiac complications.  METS  The patient's functional capacity capacity is greater than 4 METS.  EKG  The patient has no EKG or echocardiographic changes concerning for myocardial ischemia.   Heart Failure  The patient has no known history of heart failure.  Additionally, the patient reports no symptoms of heart failure and demonstrates no signs of heart failure.  Hypertension Evaluation  The patient has no known history of hypertension and has a normal blood pressure today.  Heart Rhythm Evaluation  The patient has no history of arrhythmias.  Heart Valve Evaluation  The patient has no known history of valvular heart disease. The patient has no symptoms or physical exam findings to suggest valvular heart disease.  Cardiology Evaluation  The patient follows with cardiology, Kassandra Luong CNP. Patient was last seen 11/27/23. Per note, follow up in 6 months. Carotid artery stenosis with last duplex study showing great greater than 70% stenosis of the right ICA. Less than 50% stenosis of the left proximal internal carotid artery. Scheduled for next follow up 7/9/24.    The patient has a 30-day risk for MACE of 1 predictor, 6.0% risk for cardiac death, nonfatal myocardial infarction, and nonfatal cardiac arrest.  SAHIL score which indicates a 0.1% risk of intraoperative or 30-day postoperative.    Pulmonary   The patient has findings on chart review, clinical presentation and evaluation  significant for asthma, DEBRA. The patient is at increased risk of perioperative pulmonary complications secondary to chronic obstructive lung disease, ongoing tobacco abuse, DEBRA, advanced age greater than 60. Patient educated to bring CPAP/BIPAP day of surgery. Smoking cessation discussed.    Recommend prioritizing non opioid analgesic techniques (regional and local anesthesia, nonsteroidal medications, etc) before the administration of opioids and close monitoring for hypoventilation after surgery due to DEBRA/supsected DEBRA. If intravenous narcotics are needed beyond the immediate rochelle-operative period, the patient may benefit from continuous pulse oximetry to monitor for hypoxic events till baseline Sp02 is normal on room air and  a respiratory therapy evaluation.    The patient has a stop bang score of 4, which places patient at intermediate risk for having DEBRA.    ARISCAT 27, Intermediate, 13.3% risk of in-hospital postoperative pulmonary complications  PRODIGY 13, intermediate risk of respiratory depression episode. Patient given PI sheet for preoperative deep breathing exercises.    Hematology  No diagnoses or significant findings on chart review or clinical presentation and evaluation.  Antiplatelet management   The patient is currently receiving antiplatelet therapy for carotid artery stenosis. Patient has stopped taking over the past 3 days related to bruising and prolonged bleeding with cuts. Will collaborate with surgeon and cardiology regarding preoperative instructions as she has progressive carotid artery stenosis on last duplex study 5/2024.   Anticoagulation management  The patient is not currently receiving anticoagulation therapy. Patient provided with DVT educational handout.      Caprini score 9, high risk of perioperative VTE.     Patient instructed to ambulate as soon as possible postoperatively to decrease thromboembolic risk. Initiate mechanical DVT prophylaxis as soon as possible and initiate  "chemical prophylaxis when deemed safe from a bleeding standpoint post surgery.     Transfusion Evaluation  A type and screen was obtained given the likelihood for perioperative transfusion of blood or blood products.    Gastrointestinal  The patient has diagnoses or significant findings on chart review or clinical presentation and evaluation significant for chronic dysphagia, esophagitis, gastritis, diverticulosis followed by gastroenterology. Last seen 10/16/23 by Dr. Raza Sullivan. She has history of prior alcohol use with stable LFTs. CMP obtained.    Eat 10- 0,  self-perceived oropharyngeal dysphagia scale (0-40)     Genitourinary  No diagnoses or significant findings on chart review or clinical presentation and evaluation.    Renal  The patient has no known history of chronic kidney disease. No renal diagnoses or significant findings on chart review or clinical presentation and evaluation. The patient has specific risk factors associated with increased risk of perioperative renal complications related to age greater than 55, hypertension, diabetes mellitus, COPD. Preventative measures include preoperative hydration.    Musculoskeletal  The patient has diagnoses or significant findings on chart review or clinical presentation and evaluation significant for RA, spinal stenosis, neck pain scheduled for surgery with Dr. Crump on 7/17/24.    Endocrine  Diabetes Evaluation  The patient has a history of diabetes mellitus that currently appears controlled. A1c obtained.  Thyroid Disease Evaluation  The patient has no history of thyroid disease.    ID  No diagnoses or significant findings on chart review or clinical presentation and evaluation. MRSA screening obtained. Prescriptions and instructions given for Hibiclens and Peridex.    Patient seen by Kassandra Luong CNP in cardiology 7/9. Per note,\"Carotid duplex scan shows worsening stenosis to the right ICA. Case discussed with Dr Chavez. The is not prohibitive from her " "undergoing cervical neck surgery, however I will have her repeat her carotid duplex scan and will place a referral to Dr Andrews for further evaluation and treatment\"     -Preoperative medication instructions were provided and reviewed with the patient.  Any additional testing or evaluation was explained to the patient.  NPO Instructions were discussed, and the patient's questions were answered prior to conclusion of this encounter.         "

## 2024-07-01 NOTE — PREPROCEDURE INSTRUCTIONS
Fasting Guidelines    Why must I stop eating and drinking near surgery time?  With sedation, food or liquid in your stomach can enter your lungs causing serious complications  Increases nausea and vomiting    When do I need to stop eating and drinking before my surgery?  Do not eat any food or drink any liquids after midnight the night before your surgery/procedure.  You may have sips of water to take medications.    Additional Instructions:     We have sent a prescription for Hibiclens soap and Peridex mouth wash to your preferred pharmacy.  If you have not already, Please  your prescription and start using as directed before surgery.  Follow the instruction sheet provided to you at your CPM/PAT appointment.    Avoid herbal supplements, multivitamins and NSAIDS (non-steroidal anti-inflammatory drugs) such as Advil, Aleve, Ibuprofen, Naproxen, Excedrin, Meloxicam or Celebrex for at least 7 days prior to surgery. May take Tylenol as needed.    Avoid tobacco and alcohol products for 24 hours prior to surgery.    CONTACT SURGEON'S OFFICE IF YOU DEVELOP:  * Fever = 100.4 F   * New respiratory symptoms (e.g. cough, shortness of breath, respiratory distress, sore throat)  * Recent loss of taste or smell  *Flu like symptoms such as headache, fatigue or gastrointestinal symptoms  * You develop any open sores, shingles, burning or painful urination   AND/OR:  * You no longer wish to have the surgery.  * Any other personal circumstances change that may lead to the need to cancel or defer this surgery.  *You were admitted to any hospital within one week of your planned procedure.    Seven/Six Days before Surgery:  Review your medication instructions, stop indicated medications    Day of Surgery:  Review your medication instructions, take indicated medications  Wear comfortable loose fitting clothing  Do not use moisturizers, creams, lotions or perfume  All jewelry and valuables should be left at home    Michael  Norm, CNP  [facility]  [phone]  [phone]  [email or web address]    [facility]  [phone]           Patient Information: Pre-Operative Infection Prevention Measures     Why did I have my nose, under my arms, and groin swabbed?  The purpose of the swab is to identify Staphylococcus aureus inside your nose or on your skin.  The swab was sent to the laboratory for culture.  A positive swab/culture for Staphylococcus aureus is called colonization or carriage.      What is Staphylococcus aureus?  Staphylococcus aureus, also known as “staph”, is a germ found on the skin or in the nose of healthy people.  Sometimes Staphylococcus aureus can get into the body and cause an infection.  This can be minor (such as pimples, boils, or other skin problems).  It might also be serious (such as a blood infection, pneumonia, or a surgical site infection).    What is Staphylococcus aureus colonization or carriage?  Colonization or carriage means that a person has the germ but is not sick from it.  These bacteria can be spread on the hands or when breathing or sneezing.    How is Staphylococcus aureus spread?  It is most often spread by close contact with a person or item that carries it.    What happens if my culture is positive for Staphylococcus aureus?  Your doctor/medical team will use this information to guide any antibiotic treatment which may be necessary.  Regardless of the culture results, we will clean the inside of your nose with a betadine swab just before you have your surgery.      Will I get an infection if I have Staphylococcus aureus in my nose or on my skin?  Anyone can get an infection with Staphylococcus aureus.  However, the best way to reduce your risk of infection is to follow the instructions provided to you for the use of your CHG soap and dental rinse.        Patient Information: Oral/Dental Rinse    What is  oral/dental rinse?   It is a mouthwash. It is a way of cleaning the mouth with a germ-killing solution before your surgery.  The solution contains chlorhexidine, commonly known as CHG.   It is used inside the mouth to kill a bacteria known as Staphylococcus aureus.  Let your doctor know if you are allergic to Chlorhexidine.    Why do I need to use CHG oral/dental rinse?  The CHG oral/dental rinse helps to kill a bacteria in your mouth known as Staphylococcus aureus.     This reduces the risk of infection at the surgical site.      Using your CHG oral/dental rinse  STEPS:  Use your CHG oral/dental rinse after you brush your teeth the night before (at bedtime) and the morning of your surgery.  Follow all directions on your prescription label.    Use the cap on the container to measure 15ml   Swish (gargle if you can) the mouthwash in your mouth for at least 30 seconds, (do not swallow) and spit out  After you use your CHG rinse, do not rinse your mouth with water, drink or eat.  Please refer to the prescription label for the appropriate time to resume oral intake      What side effects might I have using the CHG oral/dental rinse?  CHG rinse will stick to plaque on the teeth.  Brush and floss just before use.  Teeth brushing will help avoid staining of plaque during use.      Patient Information: Home Preoperative Antibacterial Shower      What is a home preoperative antibacterial shower?  This shower is a way of cleaning the skin with a germ-killing solution before surgery.  The solution contains chlorhexidine, commonly known as CHG.  CHG is a skin cleanser with germ-killing ability.  Let your doctor know if you are allergic to chlorhexidine.    Why do I need to take a preoperative antibacterial shower?  Skin is not sterile.  It is best to try to make your skin as free of germs as possible before surgery.  Proper cleansing with a germ-killing soap before surgery can lower the number of germs on your skin.  This helps  to reduce the risk of infection at the surgical site.  Following the instructions listed below will help you prepare your skin for surgery.      How do I use the solution?  Steps:  Begin using your CHG soap 5 days before your scheduled surgery on ________________________.    First, wash and rinse your hair using the CHG soap. Keep CHG soap away from ear canals and eyes.  Rinse completely, do not condition.  Hair extensions should be removed.  Wash your face with your normal soap and rinse.    Apply the CHG solution to a clean wet washcloth.  Turn the water off or move away from the water spray to avoid premature rinsing of the CHG soap as you are applying.   Firmly lather your entire body from the neck down.  Do not use on your face.  Pay special attention to the area(s) where your incision(s) will be located unless they are on your face.  Avoid scrubbing your skin too hard.  The important point is to have the CHG soap sit on your skin for 3 minutes.    When the 3 minutes are up, turn on the water and rinse the CHG solution off your body completely.   DO NOT wash with regular soap after you have used the CHG soap solution  Pat yourself dry with a clean, freshly-laundered towel.  DO NOT apply powders, deodorants, or lotions.  Dress in clean, freshly laundered nightclothes.    Be sure to sleep with clean, freshly laundered sheets.  Be aware that CHG will cause stains on fabrics; if you wash them with bleach after use.  Rinse your washcloth and other linens that have contact with CHG completely.  Use only non-chlorine detergents to launder the items used.   The morning of surgery is the fifth day.  Repeat the above steps and dress in clean comfortable clothing     Whom should I contact if I have any questions regarding the use of CHG soap?  Call the [facility] at [phone] if you have any questions.               Preoperative Brain  Exercises    What are brain exercises?  A brain exercise is any activity that engages your thinking (cognitive) skills.    What types of activities are considered brain exercises?  Jigsaw puzzles, crossword puzzles, word jumble, memory games, word search, and many more.  Many can be found free online or on your phone via a mobile beto.    Why should I do brain exercises before my surgery?  More recent research has shown brain exercise before surgery can lower the risk of postoperative delirium (confusion) which can be especially important for older adults.  Patients who did brain exercises for 5 to 10 hours the days before surgery, cut their risk of postoperative delirium in half up to 1 week after surgery.         [facility]    Preoperative Deep Breathing Exercises    Why it is important to do deep breathing exercises before my surgery?  Deep breathing exercises strengthen your breathing muscles.  This helps you to recover after your surgery and decreases the chance of breathing complications.      How are the deep breathing exercises done?  Sit straight with your back supported.  Breathe in deeply and slowly through your nose. Your lower rib cage should expand and your abdomen may move forward.  Hold that breath for 3 to 5 seconds.  Breathe out through pursed lips, slowly and completely.  Rest and repeat 10 times every hour while awake.  Rest longer if you become dizzy or lightheaded.         Patient and Family Education             Ways You Can Help Prevent Blood Clots             This handout explains some simple things you can do to help prevent blood clots.      Blood clots are blockages that can form in the body's veins. When a blood clot forms in your deep veins, it may be called a deep vein thrombosis, or DVT for short. Blood clots can happen in any part of the body where blood flows, but they are most common in the arms and legs. If a piece of a blood clot breaks free and travels  to the lungs, it is called a pulmonary embolus (PE). A PE can be a very serious problem.         Being in the hospital or having surgery can raise your chances of getting a blood clot because you may not be well enough to move around as much as you normally do.         Ways you can help prevent blood clots in the hospital         Wearing SCDs. SCDs stands for Sequential Compression Devices.   SCDs are special sleeves that wrap around your legs  They attach to a pump that fills them with air to gently squeeze your legs every few minutes.   This helps return the blood in your legs to your heart.   SCDs should only be taken off when walking or bathing.   SCDs may not be comfortable, but they can help save your life.               Wearing compression stockings - if your doctor orders them. These special snug fitting stockings gently squeeze your legs to help blood flow.       Walking. Walking helps move the blood in your legs.   If your doctor says it is ok, try walking the halls at least   5 times a day. Ask us to help you get up, so you don't fall.      Taking any blood thinning medicines your doctor orders.        Page 1 of 2     [facility]; 3/23   Ways you can help prevent blood clots at home       Wearing compression stockings - if your doctor orders them. ? Walking - to help move the blood in your legs.       Taking any blood thinning medicines your doctor orders.      Signs of a blood clot or PE      Tell your doctor or nurse know right away if you have of the problems listed below.    If you are at home, seek medical care right away. Call 911 for chest pain or problems breathing.               Signs of a blood clot (DVT) - such as pain,  swelling, redness or warmth in your arm or leg      Signs of a pulmonary embolism (PE) - such as chest     pain or feeling short of breath

## 2024-07-01 NOTE — H&P (VIEW-ONLY)
CPM/PAT Evaluation       Name: Sweta Lim (Sweta Lim)  /Age: 1955/68 y.o.     Visit Type:   In-Person       Chief Complaint: neck pain, preop eval     HPI  The patient is a 68 year old female with  chronic progressive neck pain and balance/gait dysfunction. She has a history of a prior C5-6 ACDF. Her past imaging has shown a large left paracentric disc bulge at C4-5 resulting in severe central stenosis. She also had a right paracentric disc bulge at C6-7 resulting in severe right foraminal stenosis. She has failed medical management and wishes to proceed with surgical intervention. She presents today for perioperative evaluation in anticipation of C4-5 Anterior cervical disectomy fusion on 24 with Dr. Crump.    Past Medical History:   Diagnosis Date    Acute frontal sinusitis, unspecified 2013    Acute frontal sinusitis    Anxiety     Asthma ([facility])     Cervical disc disease     Chronic pain disorder     COPD (chronic obstructive pulmonary disease) (Multi)     Depression     Dysphagia     Encounter for immunization 11/10/2020    Need for DTP vaccine    GERD (gastroesophageal reflux disease)     HL (hearing loss)     BL hearing aids    Hyperlipidemia     Hypothyroidism     Personal history of other diseases of the musculoskeletal system and connective tissue     History of low back pain    Personal history of other diseases of the nervous system and sense organs     History of sleep apnea    Personal history of other diseases of the respiratory system     Personal history of asthma    Personal history of other endocrine, nutritional and metabolic disease     History of diabetes mellitus    Restless legs syndrome     Restless legs syndrome    Sialoadenitis, unspecified 2021    Submandibular sialoadenitis    Sialoadenitis, unspecified 2021    Submandibular sialoadenitis    Sleep apnea     Type 2 diabetes mellitus (Multi)     Vertigo     Vision loss     Glasses       Past  "Surgical History:   Procedure Laterality Date     SECTION, CLASSIC  2016     Section    LUMBAR FUSION  2016    Lumbar Vertebral Fusion    NECK SURGERY  10/19/2012    Neck Surgery    OTHER SURGICAL HISTORY  2016    Treatment Of Fracture Of The Humerus       Patient  has no history on file for sexual activity.    Family History   Problem Relation Name Age of Onset    Hip fracture Mother      Dementia Mother      Hypertension Mother      Cancer Father      Cancer Maternal Grandmother      Diabetes Paternal Grandmother      Hypertension Other Grandparent     Diabetes Other         Allergies   Allergen Reactions    Codeine Nausea Only       Prior to Admission medications    Medication Sig Start Date End Date Taking? Authorizing Provider   albuterol 90 mcg/actuation inhaler Inhale 2 puffs 1-2 minutes apart every 4 hours as needed for cough, wheezing, shortness of breath, or prior to exercise.    Historical Provider, MD   alendronate (Fosamax) 70 mg tablet Take 1 tablet (70 mg) by mouth every 7 days. BEFORE FIRST FOOD, DRINK OR MEDICINE OF THE DAY. DISSOLVE IN 4OZ OF WATER 23   Historical Provider, MD   aspirin 81 mg chewable tablet Chew 1 tablet (81 mg) once daily. 24  RENETTA Fox-CNP   BD Stefanie 2nd Gen Pen Needle 32 gauge x \" needle 1 Needle once daily. 23   Historical Provider, MD   busPIRone (Buspar) 10 mg tablet 2 tablets each morning and 1 tablet each afternoon and 2 tablets each afternoon 3/26/24   RENETTA Hathaway-CNS   clonazePAM (KlonoPIN) 1 mg tablet Take 1 tablet (1 mg) by mouth 2 times a day. Has appointment in July 6/10/24 7/10/24  ADRY Hathaway   colchicine 0.6 mg tablet Take 1 tablet (0.6 mg) by mouth once daily.    Historical Provider, MD   escitalopram (Lexapro) 20 mg tablet Take 1 tablet (20 mg) by mouth once daily.  Patient not taking: Reported on 2024 3/26/24 6/24/24  RENETTA Hathaway-CNS   ezetimibe (Zetia) 10 mg " tablet Take 1 tablet (10 mg) by mouth once daily. 8/26/16   Historical Provider, MD   folic acid (Folvite) 1 mg tablet Take 1 tablet (1 mg) by mouth once daily. 5/21/20   Historical Provider, MD Orryle Lancets 28 gauge 1 each once daily. 6/14/23   Historical Provider, MD   FreeStyle Test strip Check glucose twice daily. May dispense formulary equivalent. Dx: IDDM E11.9 5/3/19   Historical Provider, MD   gabapentin (Neurontin) 300 mg capsule Take 1 capsule (300 mg) by mouth 2 times a day. 10/5/22   Historical Provider, MD   insulin degludec (Tresiba FlexTouch) 100 unit/mL (3 mL) injection Inject 30 units SC once daily at bedtime. Adjust by 2 units every 4 days to achieve fasting glucose < 150, and glucose never lower than 100. 4/29/19   Historical Provider, MD   levothyroxine (Synthroid, Levoxyl) 75 mcg tablet Take 1 tablet (75 mcg) by mouth once daily.    Historical Provider, MD   metFORMIN XR (Glucophage-XR) 500 mg 24 hr tablet Take 3 tablets (1,500 mg) by mouth once daily. 4/24/18   Historical Provider, MD   methocarbamol (Robaxin) 750 mg tablet Take 1 tablet (750 mg) by mouth every 4 hours. 9/18/19   Historical Provider, MD   methotrexate (Trexall) 2.5 mg tablet TAKE 6 TABLET Weekly 5/22/20   Historical Provider, MD   nicotine (Nicoderm CQ) 7 mg/24 hr patch Place 1 patch over 24 hours on the skin once every 24 hours. 2/5/24 6/4/24  LUIGI Chan   nicotine polacrilex (Commit) 4 mg lozenge Dissolve 1 lozenge (4 mg) in the mouth every 2 hours if needed for smoking cessation. 10/12/23 11/11/23  Nelson Brown DO   nicotine polacrilex (Nicorette) 2 mg lozenge Use 1 lozenge (2 mg) in the mouth or throat every 2 hours if needed for smoking cessation. 2/5/24 5/15/24  LUIGI Chan   omeprazole (PriLOSEC) 40 mg DR capsule TAKE 1 CAPSULE BY MOUTH TWICE A DAY 2/9/24   Raza Sullivan MD   polyethylene glycol (Glycolax, Miralax) 17 gram/dose powder Take 17 g by mouth twice a day. IN 8 OUNCES  OF WATER AND DRINK 8/4/23   Historical Provider, MD   primidone (Mysoline) 50 mg tablet Take 2 tablets (100 mg) by mouth 2 times a day. 11/14/16   Historical Provider, MD   sennosides (senna) 8.6 mg capsule Take 1 capsule (8.6 mg) by mouth 2 times a day.  Patient not taking: Reported on 5/28/2024 10/16/23 10/10/24  Raza Sullivan MD   simvastatin (Zocor) 40 mg tablet TAKE 1 TABLET BY MOUTH EVERY DAY  Patient not taking: Reported on 5/28/2024 2/8/24   RENETTA Chan-PAMELA   traZODone (Desyrel) 50 mg tablet TAKE 1 -2 TABLETS AT BEDTIME 1/11/24   ADRY Hathaway   Trelegy Ellipta 100-62.5-25 mcg blister with device INHALE 1 DOSE ORALLY EVERY DAY (RINSE MOUTH AFTER USE) 5/3/24   Historical Provider, MD   triamcinolone (Kenalog) 0.1 % cream Apply topically 2 times a day. Use no longer than 2wks. 5/16/24   LUIGI Chan        PAT ROS:   Constitutional:   neg    Neuro/Psych:    weakness  Eyes:   neg    Ears:   neg    Nose:   neg    Mouth:   neg    Throat:   neg    Neck:   neg     neck pain   neck stiffness  Cardio:   neg    Respiratory:    shortness of breath (intermittent)  Endocrine:   neg    GI:   neg    :   neg    Musculoskeletal:    RLS   arthralgias   myalgias  Hematologic:   neg     bruises/bleeds easily  Skin:  neg     skin changes (ecchymosis b/l arms)      Physical Exam  Vitals reviewed.   Constitutional:       Appearance: Normal appearance.   HENT:      Head: Normocephalic and atraumatic.      Nose: Nose normal.      Mouth/Throat:      Mouth: Mucous membranes are moist.      Pharynx: Oropharynx is clear.   Eyes:      Extraocular Movements: Extraocular movements intact.      Conjunctiva/sclera: Conjunctivae normal.      Pupils: Pupils are equal, round, and reactive to light.   Cardiovascular:      Rate and Rhythm: Normal rate and regular rhythm.      Pulses: Normal pulses.      Heart sounds: Normal heart sounds.   Pulmonary:      Effort: Pulmonary effort is normal.      Breath  "sounds: Normal breath sounds.   Musculoskeletal:         General: Normal range of motion.      Cervical back: Normal range of motion.   Skin:     General: Skin is warm and dry.   Neurological:      General: No focal deficit present.      Mental Status: She is alert and oriented to person, place, and time.   Psychiatric:         Mood and Affect: Mood normal.         Behavior: Behavior normal.          PAT AIRWAY:   Airway:     Mallampati::  IV    TM distance::  >3 FB    Neck ROM::  Full  normal        Visit Vitals  /74   Pulse 71   Temp 36.8 °C (98.2 °F) (Temporal)   Ht 1.702 m (5' 7\")   Wt 75.3 kg (166 lb 0.1 oz)   SpO2 94%   BMI 26.00 kg/m²   OB Status Unknown   Smoking Status Every Day   BSA 1.89 m²          DASI Risk Score      Flowsheet Row Most Recent Value   DASI SCORE 10.7   METS Score (Will be calculated only when all the questions are answered) 4.1          Caprini DVT Assessment      Flowsheet Row Most Recent Value   DVT Score 9   Current Status COPD, Major surgery planned, lasting 2-3 hours   History Prior major surgery, COPD   Age 60-75 years   BMI 30 or less          Modified Frailty Index      Flowsheet Row Most Recent Value   Modified Frailty Index Calculator .[id number]          CHADS2 Stroke Risk  Current as of 41 minutes ago        N/A 3 to 100%: High Risk   2 to < 3%: Medium Risk   0 to < 2%: Low Risk     Last Change: N/A          This score determines the patient's risk of having a stroke if the patient has atrial fibrillation.        This score is not applicable to this patient. Components are not calculated.          Revised Cardiac Risk Index      Flowsheet Row Most Recent Value   Revised Cardiac Risk Calculator 1          Apfel Simplified Score      Flowsheet Row Most Recent Value   Apfel Simplified Score Calculator 2          Risk Analysis Index Results This Encounter    No data found in the last 1 encounters.       Stop Bang Score      Flowsheet Row Most Recent Value   Do you snore loudly? " 1   Do you often feel tired or fatigued after your sleep? 1   Has anyone ever observed you stop breathing in your sleep? 1   Do you have or are you being treated for high blood pressure? 0   Recent BMI (Calculated) 26.6   Is BMI greater than 35 kg/m2? 0=No   Age older than 50 years old? 1=Yes   Is your neck circumference greater than 17 inches (Male) or 16 inches (Female)? 0   Gender - Male 0=No   STOP-BANG Total Score 4          Recent Results (from the past 168 hour(s))   Basic Metabolic Panel    Collection Time: 07/01/24  2:34 PM   Result Value Ref Range    Glucose 135 (H) 74 - 99 mg/dL    Sodium 139 136 - 145 mmol/L    Potassium 5.0 3.5 - 5.3 mmol/L    Chloride 101 98 - 107 mmol/L    Bicarbonate 29 21 - 32 mmol/L    Anion Gap 14 10 - 20 mmol/L    Urea Nitrogen 36 (H) 6 - 23 mg/dL    Creatinine 0.46 (L) 0.50 - 1.05 mg/dL    eGFR >90 >60 mL/min/1.73m*2    Calcium 9.8 8.6 - 10.6 mg/dL   Prealbumin    Collection Time: 07/01/24  2:34 PM   Result Value Ref Range    Prealbumin 23.2 18.0 - 40.0 mg/dL   CBC and Auto Differential    Collection Time: 07/01/24  2:34 PM   Result Value Ref Range    WBC 11.5 (H) 4.4 - 11.3 x10*3/uL    nRBC 0.0 0.0 - 0.0 /100 WBCs    RBC 4.36 4.00 - 5.20 x10*6/uL    Hemoglobin 14.9 12.0 - 16.0 g/dL    Hematocrit 45.8 36.0 - 46.0 %     (H) 80 - 100 fL    MCH 34.2 (H) 26.0 - 34.0 pg    MCHC 32.5 32.0 - 36.0 g/dL    RDW 15.8 (H) 11.5 - 14.5 %    Platelets      Neutrophils % 82.3 40.0 - 80.0 %    Immature Granulocytes %, Automated 0.3 0.0 - 0.9 %    Lymphocytes % 11.4 13.0 - 44.0 %    Monocytes % 4.9 2.0 - 10.0 %    Eosinophils % 0.4 0.0 - 6.0 %    Basophils % 0.7 0.0 - 2.0 %    Neutrophils Absolute 9.50 (H) 1.20 - 7.70 x10*3/uL    Immature Granulocytes Absolute, Automated 0.03 0.00 - 0.70 x10*3/uL    Lymphocytes Absolute 1.31 1.20 - 4.80 x10*3/uL    Monocytes Absolute 0.56 0.10 - 1.00 x10*3/uL    Eosinophils Absolute 0.05 0.00 - 0.70 x10*3/uL    Basophils Absolute 0.08 0.00 - 0.10 x10*3/uL    Type And Screen    Collection Time: 07/01/24  2:34 PM   Result Value Ref Range    ABO TYPE O     Rh TYPE POS     ANTIBODY SCREEN NEG    Coagulation Screen    Collection Time: 07/01/24  2:50 PM   Result Value Ref Range    Protime 12.0 9.8 - 12.8 seconds    INR 1.1 0.9 - 1.1    aPTT 29 27 - 38 seconds   Staphylococcus aureus/MRSA colonization, Culture    Collection Time: 07/01/24  2:50 PM    Specimen: Anterior Nares; Swab   Result Value Ref Range    Staph/MRSA Screen Culture No Staphylococcus aureus isolated    Comprehensive Metabolic Panel    Collection Time: 07/01/24  2:50 PM   Result Value Ref Range    Glucose 133 (H) 74 - 99 mg/dL    Sodium 138 136 - 145 mmol/L    Potassium 4.5 3.5 - 5.3 mmol/L    Chloride 101 98 - 107 mmol/L    Bicarbonate 29 21 - 32 mmol/L    Anion Gap 13 10 - 20 mmol/L    Urea Nitrogen 38 (H) 6 - 23 mg/dL    Creatinine 0.45 (L) 0.50 - 1.05 mg/dL    eGFR >90 >60 mL/min/1.73m*2    Calcium 9.6 8.6 - 10.6 mg/dL    Albumin 3.9 3.4 - 5.0 g/dL    Alkaline Phosphatase 83 33 - 136 U/L    Total Protein 6.7 6.4 - 8.2 g/dL    AST 15 9 - 39 U/L    Bilirubin, Total 0.4 0.0 - 1.2 mg/dL    ALT 18 7 - 45 U/L   Hemoglobin A1C    Collection Time: 07/01/24  2:50 PM   Result Value Ref Range    Hemoglobin A1C 6.7 (H) see below %    Estimated Average Glucose 146 Not Established mg/dL        Diagnostic Results           [facility] US CAROTID ARTERY DUPLEX BILATERAL  5/21/24  CONCLUSIONS:  Right Carotid: Findings are consistent with greater than 70% stenosis of the right proximal internal carotid artery. Turbulent flow seen by color Doppler. Right external carotid artery appears patent with no evidence of stenosis. The right vertebral artery is patent with antegrade flow. No evidence of hemodynamically significant stenosis in the right subclavian artery.  Left Carotid: Findings are consistent with less than 50% stenosis of the left proximal internal carotid artery. Laminar flow seen by color Doppler. There are elevated  velocities in the left ECA that are suggestive of disease. The left vertebral artery is patent with antegrade flow. No evidence of hemodynamically significant stenosis in the left subclavian artery.    VASC US CAROTID ARTERY DUPLEX BILATERAL  11/27/23  CONCLUSIONS:  Right Carotid: Findings are consistent with 50 to 69% stenosis of the right proximal internal carotid artery. Turbulent flow seen by color Doppler. Right external carotid artery appears patent with no evidence of stenosis. The right vertebral artery is patent with antegrade flow. No evidence of hemodynamically significant stenosis in the right subclavian artery.  Left Carotid: Findings are consistent with less than 50% stenosis of the left proximal internal carotid artery. Turbulent flow seen by color Doppler. There are elevated velocities in the left ECA that are suggestive of disease. The left vertebral artery is patent with antegrade flow. No evidence of hemodynamically significant stenosis in the left subclavian artery.     5/10/23 ECHO  1. Left ventricular systolic function is normal with a 55-60% estimated ejection fraction.  2. Aortic valve sclerosis.    Assessment and Plan:     Anesthesia:  The patient denies problems with anesthesia in the past such as PONV, prolonged sedation, awareness, dental damage, aspiration, cardiac arrest, difficult intubation, or unexpected hospital admissions.     Neuro:   The patient has diagnoses or significant findings on chart review or clinical presentation and evaluation significant for recurrent depression, anxiety followed by behavior health. Managed on escitalopram, trazodone. Followed by behavioral health with no current complaints. The patient is at increased risk for postoperative delirium secondary to age 65 or older, depression, polypharmacy. The patient is at increased risk for perioperative stroke secondary to carotid stenosis, increased age, hyperlipidemia, female gender, diabetes mellitus, general  anesthesia, operative time >2.5 hours. Handouts for preoperative brain exercises given to patient.    HEENT/Airway  No diagnoses, significant findings on chart review, clinical presentation, or evaluation. No documented or reported history of airway difficulty.     Cardiovascular  The patient is scheduled for non-cardiac surgery associated with elevated risk. The patient has no major cardiac contraindications to non- cardiac surgery.  RCRI  The patient meets 0-1 RCRI criteria and therefore has a less than 1% risk of major adverse cardiac complications.  METS  The patient's functional capacity capacity is greater than 4 METS.  EKG  The patient has no EKG or echocardiographic changes concerning for myocardial ischemia.   Heart Failure  The patient has no known history of heart failure.  Additionally, the patient reports no symptoms of heart failure and demonstrates no signs of heart failure.  Hypertension Evaluation  The patient has no known history of hypertension and has a normal blood pressure today.  Heart Rhythm Evaluation  The patient has no history of arrhythmias.  Heart Valve Evaluation  The patient has no known history of valvular heart disease. The patient has no symptoms or physical exam findings to suggest valvular heart disease.  Cardiology Evaluation  The patient follows with cardiology, Kassandra Luong CNP. Patient was last seen 11/27/23. Per note, follow up in 6 months. Carotid artery stenosis with last duplex study showing great greater than 70% stenosis of the right ICA. Less than 50% stenosis of the left proximal internal carotid artery. Scheduled for next follow up 7/9/24.    The patient has a 30-day risk for MACE of 1 predictor, 6.0% risk for cardiac death, nonfatal myocardial infarction, and nonfatal cardiac arrest.  SAHIL score which indicates a 0.1% risk of intraoperative or 30-day postoperative.    Pulmonary   The patient has findings on chart review, clinical presentation and evaluation  significant for asthma, DEBRA. The patient is at increased risk of perioperative pulmonary complications secondary to chronic obstructive lung disease, ongoing tobacco abuse, DEBRA, advanced age greater than 60. Patient educated to bring CPAP/BIPAP day of surgery. Smoking cessation discussed.    Recommend prioritizing non opioid analgesic techniques (regional and local anesthesia, nonsteroidal medications, etc) before the administration of opioids and close monitoring for hypoventilation after surgery due to DEBRA/supsected DEBRA. If intravenous narcotics are needed beyond the immediate rochelle-operative period, the patient may benefit from continuous pulse oximetry to monitor for hypoxic events till baseline Sp02 is normal on room air and  a respiratory therapy evaluation.    The patient has a stop bang score of 4, which places patient at intermediate risk for having DEBRA.    ARISCAT 27, Intermediate, 13.3% risk of in-hospital postoperative pulmonary complications  PRODIGY 13, intermediate risk of respiratory depression episode. Patient given PI sheet for preoperative deep breathing exercises.    Hematology  No diagnoses or significant findings on chart review or clinical presentation and evaluation.  Antiplatelet management   The patient is currently receiving antiplatelet therapy for carotid artery stenosis. Patient has stopped taking over the past 3 days related to bruising and prolonged bleeding with cuts. Will collaborate with surgeon and cardiology regarding preoperative instructions as she has progressive carotid artery stenosis on last duplex study 5/2024.   Anticoagulation management  The patient is not currently receiving anticoagulation therapy. Patient provided with DVT educational handout.      Caprini score 9, high risk of perioperative VTE.     Patient instructed to ambulate as soon as possible postoperatively to decrease thromboembolic risk. Initiate mechanical DVT prophylaxis as soon as possible and initiate  "chemical prophylaxis when deemed safe from a bleeding standpoint post surgery.     Transfusion Evaluation  A type and screen was obtained given the likelihood for perioperative transfusion of blood or blood products.    Gastrointestinal  The patient has diagnoses or significant findings on chart review or clinical presentation and evaluation significant for chronic dysphagia, esophagitis, gastritis, diverticulosis followed by gastroenterology. Last seen 10/16/23 by Dr. Raza Sullivan. She has history of prior alcohol use with stable LFTs. CMP obtained.    Eat 10- 0,  self-perceived oropharyngeal dysphagia scale (0-40)     Genitourinary  No diagnoses or significant findings on chart review or clinical presentation and evaluation.    Renal  The patient has no known history of chronic kidney disease. No renal diagnoses or significant findings on chart review or clinical presentation and evaluation. The patient has specific risk factors associated with increased risk of perioperative renal complications related to age greater than 55, hypertension, diabetes mellitus, COPD. Preventative measures include preoperative hydration.    Musculoskeletal  The patient has diagnoses or significant findings on chart review or clinical presentation and evaluation significant for RA, spinal stenosis, neck pain scheduled for surgery with Dr. Crump on 7/17/24.    Endocrine  Diabetes Evaluation  The patient has a history of diabetes mellitus that currently appears controlled. A1c obtained.  Thyroid Disease Evaluation  The patient has no history of thyroid disease.    ID  No diagnoses or significant findings on chart review or clinical presentation and evaluation. MRSA screening obtained. Prescriptions and instructions given for Hibiclens and Peridex.    Patient seen by Kassandra Luong CNP in cardiology 7/9. Per note,\"Carotid duplex scan shows worsening stenosis to the right ICA. Case discussed with Dr Chavez. The is not prohibitive from her " "undergoing cervical neck surgery, however I will have her repeat her carotid duplex scan and will place a referral to Dr Andrews for further evaluation and treatment\"     -Preoperative medication instructions were provided and reviewed with the patient.  Any additional testing or evaluation was explained to the patient.  NPO Instructions were discussed, and the patient's questions were answered prior to conclusion of this encounter.         "

## 2024-07-02 ENCOUNTER — APPOINTMENT (OUTPATIENT)
Dept: BEHAVIORAL HEALTH | Facility: CLINIC | Age: 69
End: 2024-07-02
Payer: MEDICARE

## 2024-07-02 VITALS
HEIGHT: 67 IN | SYSTOLIC BLOOD PRESSURE: 131 MMHG | HEART RATE: 71 BPM | DIASTOLIC BLOOD PRESSURE: 83 MMHG | WEIGHT: 162 LBS | BODY MASS INDEX: 25.43 KG/M2

## 2024-07-02 DIAGNOSIS — F51.01 PRIMARY INSOMNIA: ICD-10-CM

## 2024-07-02 DIAGNOSIS — F41.1 GAD (GENERALIZED ANXIETY DISORDER): ICD-10-CM

## 2024-07-02 PROCEDURE — 3075F SYST BP GE 130 - 139MM HG: CPT | Performed by: CLINICAL NURSE SPECIALIST

## 2024-07-02 PROCEDURE — 3079F DIAST BP 80-89 MM HG: CPT | Performed by: CLINICAL NURSE SPECIALIST

## 2024-07-02 PROCEDURE — 3044F HG A1C LEVEL LT 7.0%: CPT | Performed by: CLINICAL NURSE SPECIALIST

## 2024-07-02 PROCEDURE — 3008F BODY MASS INDEX DOCD: CPT | Performed by: CLINICAL NURSE SPECIALIST

## 2024-07-02 PROCEDURE — 99213 OFFICE O/P EST LOW 20 MIN: CPT | Performed by: CLINICAL NURSE SPECIALIST

## 2024-07-02 RX ORDER — ESCITALOPRAM OXALATE 20 MG/1
20 TABLET ORAL DAILY
Qty: 90 TABLET | Refills: 0 | Status: SHIPPED | OUTPATIENT
Start: 2024-07-02 | End: 2024-09-30

## 2024-07-02 RX ORDER — TRAZODONE HYDROCHLORIDE 50 MG/1
TABLET ORAL
Qty: 180 TABLET | Refills: 0 | Status: SHIPPED | OUTPATIENT
Start: 2024-07-02

## 2024-07-02 RX ORDER — CLONAZEPAM 1 MG/1
TABLET ORAL
Qty: 45 TABLET | Refills: 0 | Status: SHIPPED | OUTPATIENT
Start: 2024-07-02 | End: 2024-07-28 | Stop reason: SDUPTHER
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
## 2024-07-03 LAB — STAPHYLOCOCCUS SPEC CULT: NORMAL

## 2024-07-09 ENCOUNTER — APPOINTMENT (OUTPATIENT)
Dept: CARDIOLOGY | Facility: CLINIC | Age: 69
End: 2024-07-09
Payer: MEDICARE

## 2024-07-09 VITALS
WEIGHT: 166.8 LBS | HEART RATE: 62 BPM | DIASTOLIC BLOOD PRESSURE: 68 MMHG | HEIGHT: 67 IN | BODY MASS INDEX: 26.18 KG/M2 | SYSTOLIC BLOOD PRESSURE: 124 MMHG

## 2024-07-09 DIAGNOSIS — I65.23 ATHEROSCLEROSIS OF BOTH CAROTID ARTERIES: Primary | ICD-10-CM

## 2024-07-09 PROCEDURE — 3008F BODY MASS INDEX DOCD: CPT | Performed by: NURSE PRACTITIONER

## 2024-07-09 PROCEDURE — 3078F DIAST BP <80 MM HG: CPT | Performed by: NURSE PRACTITIONER

## 2024-07-09 PROCEDURE — 3044F HG A1C LEVEL LT 7.0%: CPT | Performed by: NURSE PRACTITIONER

## 2024-07-09 PROCEDURE — 3074F SYST BP LT 130 MM HG: CPT | Performed by: NURSE PRACTITIONER

## 2024-07-09 PROCEDURE — 99214 OFFICE O/P EST MOD 30 MIN: CPT | Performed by: NURSE PRACTITIONER

## 2024-07-09 NOTE — PROGRESS NOTES
"Sweta Lim is a 68 y.o. female     History Of Present Illness   HPI       Social HX  Social History     Tobacco Use   • Smoking status: Every Day     Current packs/day: 1.00     Average packs/day: 1 pack/day for 52.5 years (52.5 ttl pk-yrs)     Types: Cigarettes     Start date: 1972   • Smokeless tobacco: Never   Substance Use Topics   • Alcohol use: Not Currently   • Drug use: Never          Family HX  Family History   Problem Relation Name Age of Onset   • Hip fracture Mother     • Dementia Mother     • Hypertension Mother     • Cancer Father     • Cancer Maternal Grandmother     • Diabetes Paternal Grandmother     • Hypertension Other Grandparent    • Diabetes Other            Review Of Systems   Review of Systems       Allergies  Allergies   Allergen Reactions   • Codeine Nausea Only          Vitals  There were no vitals taken for this visit.        Physical Exam  Physical Exam       Current/Home Meds    Current Outpatient Medications:   •  albuterol 90 mcg/actuation inhaler, Inhale 2 puffs 1-2 minutes apart every 4 hours as needed for cough, wheezing, shortness of breath, or prior to exercise., Disp: , Rfl:   •  alendronate (Fosamax) 70 mg tablet, Take 1 tablet (70 mg) by mouth every 7 days. BEFORE FIRST FOOD, DRINK OR MEDICINE OF THE DAY. DISSOLVE IN 4OZ OF WATER, Disp: , Rfl:   •  BD Stefanie 2nd Gen Pen Needle 32 gauge x 5/32\" needle, 1 Needle once daily., Disp: , Rfl:   •  busPIRone (Buspar) 10 mg tablet, 2 tablets each morning and 1 tablet each afternoon and 2 tablets each afternoon, Disp: 450 tablet, Rfl: 1  •  chlorhexidine (Hibiclens) 4 % external liquid, Use as directed daily preoperatively, Disp: 473 mL, Rfl: 0  •  chlorhexidine (Peridex) 0.12 % solution, Swish and spit with 15ml of solution the night before and morning of surgery. Do not swallow., Disp: 15 mL, Rfl: 0  •  clonazePAM (KlonoPIN) 1 mg tablet, 1/2 tablet daily each morning and 1 tablet daily each night , this is a reduced dose, Disp: 45 " tablet, Rfl: 0  •  colchicine 0.6 mg tablet, Take 1 tablet (0.6 mg) by mouth once daily., Disp: , Rfl:   •  escitalopram (Lexapro) 20 mg tablet, Take 1 tablet (20 mg) by mouth once daily. (Patient not taking: Reported on 5/28/2024), Disp: 90 tablet, Rfl: 1  •  escitalopram (Lexapro) 20 mg tablet, Take 1 tablet (20 mg) by mouth once daily., Disp: 90 tablet, Rfl: 0  •  ezetimibe (Zetia) 10 mg tablet, Take 1 tablet (10 mg) by mouth once daily., Disp: , Rfl:   •  folic acid (Folvite) 1 mg tablet, Take 1 tablet (1 mg) by mouth once daily., Disp: , Rfl:   •  FreeStyle Lancets 28 gauge, 1 each once daily., Disp: , Rfl:   •  FreeStyle Test strip, Check glucose twice daily. May dispense formulary equivalent. Dx: IDDM E11.9, Disp: , Rfl:   •  gabapentin (Neurontin) 300 mg capsule, Take 1 capsule (300 mg) by mouth 2 times a day., Disp: , Rfl:   •  insulin degludec (Tresiba FlexTouch) 100 unit/mL (3 mL) injection, Inject 30 units SC once daily at bedtime. Adjust by 2 units every 4 days to achieve fasting glucose < 150, and glucose never lower than 100., Disp: , Rfl:   •  levothyroxine (Synthroid, Levoxyl) 75 mcg tablet, Take 1 tablet (75 mcg) by mouth once daily., Disp: , Rfl:   •  metFORMIN XR (Glucophage-XR) 500 mg 24 hr tablet, Take 3 tablets (1,500 mg) by mouth once daily., Disp: , Rfl:   •  methocarbamol (Robaxin) 750 mg tablet, Take 1 tablet (750 mg) by mouth every 4 hours., Disp: , Rfl:   •  methotrexate (Trexall) 2.5 mg tablet, TAKE 6 TABLET Weekly, Disp: , Rfl:   •  nicotine (Nicoderm CQ) 7 mg/24 hr patch, Place 1 patch over 24 hours on the skin once every 24 hours., Disp: 30 patch, Rfl: 3  •  nicotine polacrilex (Commit) 4 mg lozenge, Dissolve 1 lozenge (4 mg) in the mouth every 2 hours if needed for smoking cessation., Disp: 100 lozenge, Rfl: 0  •  nicotine polacrilex (Nicorette) 2 mg lozenge, Use 1 lozenge (2 mg) in the mouth or throat every 2 hours if needed for smoking cessation., Disp: 200 lozenge, Rfl: 5  •   "omeprazole (PriLOSEC) 40 mg DR capsule, TAKE 1 CAPSULE BY MOUTH TWICE A DAY, Disp: 180 capsule, Rfl: 2  •  polyethylene glycol (Glycolax, Miralax) 17 gram/dose powder, Take 17 g by mouth twice a day. IN 8 OUNCES OF WATER AND DRINK, Disp: , Rfl:   •  primidone (Mysoline) 50 mg tablet, Take 2 tablets (100 mg) by mouth 2 times a day., Disp: , Rfl:   •  simvastatin (Zocor) 40 mg tablet, TAKE 1 TABLET BY MOUTH EVERY DAY (Patient not taking: Reported on 5/28/2024), Disp: 90 tablet, Rfl: 2  •  traZODone (Desyrel) 50 mg tablet, TAKE 1 -2 TABLETS AT BEDTIME, Disp: 180 tablet, Rfl: 0  •  Trelegy Ellipta 100-62.5-25 mcg blister with device, INHALE 1 DOSE ORALLY EVERY DAY (RINSE MOUTH AFTER USE), Disp: , Rfl:     Current Facility-Administered Medications:   •  nicotine (Nicoderm CQ) 21 mg/24 hr patch 1 patch, 1 patch, transdermal, Once, Nelson Brown, DO       Labs           EKG Findings  EKG: {ekg findings:883999::\"normal EKG, normal sinus rhythm\",\"unchanged from previous tracings\"}.    {cardiac diagnosis history:15121}.        Cardiac Service Results:  No results found for this or any previous visit from the past 365 days.        Assessment/Plan      {Assess/Plan SmartLinks (Optional):34321::\"***\"}    "

## 2024-07-09 NOTE — PROGRESS NOTES
"Chief Complaint:   Follow-up (6 mo had carotid )     History Of Present Illness:    Sweta Lim is a 68 y.o. female presenting for a follow up of her carotid disease.  She is complaining of RIGHT footdrop, dizziness and vision changes when she turns her head.  She recently underwent a carotid duplex scan that shows worsening velocities to the right ICA.  Per the patient, she is scheduled for cervical neck surgery on 2024.     Last Recorded Vitals:  Vitals:    24 1436   BP: 124/68   BP Location: Left arm   Patient Position: Sitting   BP Cuff Size: Adult   Pulse: 62   Weight: 75.7 kg (166 lb 12.8 oz)   Height: 1.702 m (5' 7\")       Past Medical History:  She has a past medical history of Acute frontal sinusitis, unspecified (2013), Anxiety, Asthma ([facility]), Cervical disc disease, Chronic pain disorder, COPD (chronic obstructive pulmonary disease) (Multi), Depression, Dysphagia, Encounter for immunization (11/10/2020), GERD (gastroesophageal reflux disease), HL (hearing loss), Hyperlipidemia, Hypothyroidism, Personal history of other diseases of the musculoskeletal system and connective tissue, Personal history of other diseases of the nervous system and sense organs, Personal history of other diseases of the respiratory system, Personal history of other endocrine, nutritional and metabolic disease, Restless legs syndrome, Sialoadenitis, unspecified (2021), Sialoadenitis, unspecified (2021), Sleep apnea, Type 2 diabetes mellitus (Multi), Vertigo, and Vision loss.    Past Surgical History:  She has a past surgical history that includes Neck surgery (10/19/2012); Lumbar fusion (2016);  section, classic (2016); and Other surgical history (2016).      Social History:  She reports that she has been smoking cigarettes. She started smoking about 52 years ago. She has a 52.5 pack-year smoking history. She has never used smokeless tobacco. She reports that she does " "not currently use alcohol. She reports that she does not use drugs.    Family History:  Family History   Problem Relation Name Age of Onset    Hip fracture Mother      Dementia Mother      Hypertension Mother      Cancer Father      Cancer Maternal Grandmother      Diabetes Paternal Grandmother      Hypertension Other Grandparent     Diabetes Other          Allergies:  Codeine      REVIEW OF SYMPTOMS:  Constitutional: + feeling tired.   Eyes: + vision changes with moving her neck  eyesight problems.  No vision loss or change in vision  ENT: no hearing loss and no nosebleeds.   Cardiovascular: No intermittent leg claudication,   No chest pain, no tightness or heavy pressure  No shortness of breath,  No palpitations,  No lower extremity edema  The heart rate is regular  Respiratory: no chronic cough and no shortness of breath.   Gastrointestinal: no change in bowel habits and no blood in stools.   Genitourinary: no urinary frequency.   Skin: no skin rashes.   Neurological: No frequent falls.   + dizziness  +weakness  +right foot drop  Denies headaches  Psychiatric: no depression and not suicidal.   All other systems have been reviewed and are negative for complaint.    Outpatient Medications:  Current Outpatient Medications   Medication Instructions    alendronate (Fosamax) 70 mg tablet 1 tablet, oral, Every 7 days, BEFORE FIRST FOOD, DRINK OR MEDICINE OF THE DAY. DISSOLVE IN 4OZ OF WATER<BR>    BD Stefanie 2nd Gen Pen Needle 32 gauge x 5/32\" needle 1 Needle, Daily    busPIRone (Buspar) 10 mg tablet 2 tablets each morning and 1 tablet each afternoon and 2 tablets each afternoon    chlorhexidine (Hibiclens) 4 % external liquid Use as directed daily preoperatively    chlorhexidine (Peridex) 0.12 % solution Swish and spit with 15ml of solution the night before and morning of surgery. Do not swallow.    clonazePAM (KlonoPIN) 1 mg tablet 1/2 tablet daily each morning and 1 tablet daily each night , this is a reduced dose    " colchicine 0.6 mg tablet 1 tablet, oral, Daily    escitalopram (LEXAPRO) 20 mg, oral, Daily    escitalopram (LEXAPRO) 20 mg, oral, Daily    ezetimibe (Zetia) 10 mg tablet 1 tablet, oral, Daily    folic acid (Folvite) 1 mg tablet 1 tablet, oral, Daily    FreeStyle Lancets 28 gauge 1 each, Daily    FreeStyle Test strip Check glucose twice daily. May dispense formulary equivalent. Dx: IDDM E11.9    gabapentin (Neurontin) 300 mg capsule 1 capsule, oral, 2 times daily    insulin degludec (Tresiba FlexTouch) 100 unit/mL (3 mL) injection Inject 30 units SC once daily at bedtime. Adjust by 2 units every 4 days to achieve fasting glucose < 150, and glucose never lower than 100.    levothyroxine (SYNTHROID, LEVOXYL) 75 mcg, oral, Daily    metFORMIN XR (Glucophage-XR) 500 mg 24 hr tablet 3 tablets, oral, Daily    methocarbamol (Robaxin) 750 mg tablet 1 tablet, oral, Every 4 hours    methotrexate (Trexall) 2.5 mg tablet TAKE 6 TABLET Weekly    nicotine (Nicoderm CQ) 7 mg/24 hr patch 1 patch, transdermal, Every 24 hours    nicotine polacrilex (COMMIT) 4 mg, Mouth/Throat, Every 2 hour PRN    nicotine polacrilex (NICORETTE) 2 mg, Mouth/Throat, Every 2 hour PRN    omeprazole (PRILOSEC) 40 mg, oral, 2 times daily    polyethylene glycol (GLYCOLAX, MIRALAX) 17 g, oral, 2 times daily, IN 8 OUNCES OF WATER AND DRINK    primidone (Mysoline) 50 mg tablet 2 tablets, oral, 2 times daily    simvastatin (ZOCOR) 40 mg, oral, Daily    traZODone (Desyrel) 50 mg tablet TAKE 1 -2 TABLETS AT BEDTIME    Trelegy Ellipta 100-62.5-25 mcg blister with device INHALE 1 DOSE ORALLY EVERY DAY (RINSE MOUTH AFTER USE)       Physical Exam:  Constitutional: alert and in no acute distress.   Eyes: no erythema, swelling or discharge from the eye .   Neck: neck is supple, symmetric, trachea midline, no masses  and no thyromegaly .   Pulmonary: No increased work of breathing or signs of respiratory distress    Lungs clear to auscultation.    No friction  "rub.  Cardiovascular: carotid pulses 2+ bilaterally with no bruit    JVP was normal, no thrills ,   Regular rhythm, normal S1 and S2, no murmurs    Pedal pulses 2+ bilaterally    No edema .   Abdomen: abdomen non-tender, no masses  and no hepatomegaly . No pulsatile mass noted  Skin: pale. skin warm and dry, normal skin turgor .   Psychiatric judgment and insight is normal  and oriented to person, place and time .     +neck pain with movement     Last Labs:  CBC -  Lab Results   Component Value Date    WBC 11.5 (H) 07/01/2024    HGB 14.9 07/01/2024    HCT 45.8 07/01/2024     (H) 07/01/2024    PLT  07/01/2024      Comment:      PLATELET CLUMPS PRECLUDE QUANTITATION. PLATELET ESTIMATE APPEARS ADEQUATE       CMP -  Lab Results   Component Value Date    CALCIUM 9.6 07/01/2024    PROT 6.7 07/01/2024    ALBUMIN 3.9 07/01/2024    AST 15 07/01/2024    ALT 18 07/01/2024    ALKPHOS 83 07/01/2024    BILITOT 0.4 07/01/2024       LIPID PANEL -   No results found for: \"CHOL\", \"TRIG\", \"HDL\", \"CHHDL\", \"LDLF\", \"VLDL\", \"NHDL\"    RENAL FUNCTION PANEL -   Lab Results   Component Value Date    GLUCOSE 133 (H) 07/01/2024     07/01/2024    K 4.5 07/01/2024     07/01/2024    CO2 29 07/01/2024    ANIONGAP 13 07/01/2024    BUN 38 (H) 07/01/2024    CREATININE 0.45 (L) 07/01/2024    CALCIUM 9.6 07/01/2024    ALBUMIN 3.9 07/01/2024        Lab Results   Component Value Date    HGBA1C 6.7 (H) 07/01/2024    HGBA1C 7.4 (A) 05/16/2024       Last Cardiology Tests:  ECG:  No results found for this or any previous visit from the past 1095 days.      Echo:  Transthoracic Echo (TTE) Complete 05/21/2024      Ejection Fractions:  No results found for: \"EF\"    Cath:  No results found for this or any previous visit from the past 1095 days.      Stress Test:  No results found for this or any previous visit from the past 1095 days.      Cardiac Imaging:  Right                        Left    PSV      EDV                PSV      EDV  85 cm/s   "          CCA P    81 cm/s  67 cm/s            CCA D    63 cm/s  251 cm/s 56 cm/s   ICA P    114 cm/s 43 cm/s  182 cm/s 38 cm/s   ICA M    100 cm/s 35 cm/s  96 cm/s  27 cm/s   ICA D    96 cm/s  15 cm/s  143 cm/s            ECA     251 cm/s  59 cm/s  9 cm/s  Vertebral  74 cm/s  15 cm/s  139 cm/s         Subclavian 129 cm/s                   Right Left  ICA/CCA Ratio  3.7  1.8            Assessment/Plan   CAROTID stenosis:  Carotid duplex scan shows worsening stenosis to the right ICA.  Case discussed with Dr Chavez.  The is not prohibitive from her undergoing cervical neck surgery, however I will have her repeat her carotid duplex scan and will place a referral to Dr Andrews for further evaluation and treatment.       Kassandra Luong, RENETTA-CNP

## 2024-07-10 NOTE — PROGRESS NOTES
"Sweta Lim is a 68 y.o. female     History Of Present Illness   Sweta Lim is a 68 y.o. female presenting for a follow up of her carotid disease.  She is complaining of RIGHT footdrop, dizziness and vision changes when she turns her head.  She recently underwent a carotid duplex scan that shows worsening velocities to the right ICA.  Per the patient, she is scheduled for cervical neck surgery on 2024.     Last Recorded Vitals:  Vitals       Vitals:     24 1436   BP: 124/68   BP Location: Left arm   Patient Position: Sitting   BP Cuff Size: Adult   Pulse: 62   Weight: 75.7 kg (166 lb 12.8 oz)   Height: 1.702 m (5' 7\")            Past Medical History:  She has a past medical history of Acute frontal sinusitis, unspecified (2013), Anxiety, Asthma ([facility]), Cervical disc disease, Chronic pain disorder, COPD (chronic obstructive pulmonary disease) (Multi), Depression, Dysphagia, Encounter for immunization (11/10/2020), GERD (gastroesophageal reflux disease), HL (hearing loss), Hyperlipidemia, Hypothyroidism, Personal history of other diseases of the musculoskeletal system and connective tissue, Personal history of other diseases of the nervous system and sense organs, Personal history of other diseases of the respiratory system, Personal history of other endocrine, nutritional and metabolic disease, Restless legs syndrome, Sialoadenitis, unspecified (2021), Sialoadenitis, unspecified (2021), Sleep apnea, Type 2 diabetes mellitus (Multi), Vertigo, and Vision loss.     Past Surgical History:  She has a past surgical history that includes Neck surgery (10/19/2012); Lumbar fusion (2016);  section, classic (2016); and Other surgical history (2016).      Social History:  She reports that she has been smoking cigarettes. She started smoking about 52 years ago. She has a 52.5 pack-year smoking history. She has never used smokeless tobacco. She reports that she " "does not currently use alcohol. She reports that she does not use drugs.     Family History:  Family History          Family History   Problem Relation Name Age of Onset    Hip fracture Mother        Dementia Mother        Hypertension Mother        Cancer Father        Cancer Maternal Grandmother        Diabetes Paternal Grandmother        Hypertension Other Grandparent      Diabetes Other                Allergies:  Codeine        REVIEW OF SYMPTOMS:  Constitutional: + feeling tired.   Eyes: + vision changes with moving her neck  eyesight problems.  No vision loss or change in vision  ENT: no hearing loss and no nosebleeds.   Cardiovascular: No intermittent leg claudication,   No chest pain, no tightness or heavy pressure  No shortness of breath,  No palpitations,  No lower extremity edema  The heart rate is regular  Respiratory: no chronic cough and no shortness of breath.   Gastrointestinal: no change in bowel habits and no blood in stools.   Genitourinary: no urinary frequency.   Skin: no skin rashes.   Neurological: No frequent falls.   + dizziness  +weakness  +right foot drop  Denies headaches  Psychiatric: no depression and not suicidal.   All other systems have been reviewed and are negative for complaint.    Outpatient Medications:       Current Outpatient Medications   Medication Instructions    alendronate (Fosamax) 70 mg tablet 1 tablet, oral, Every 7 days, BEFORE FIRST FOOD, DRINK OR MEDICINE OF THE DAY. DISSOLVE IN 4OZ OF WATER<BR>    BD Stefanie 2nd Gen Pen Needle 32 gauge x 5/32\" needle 1 Needle, Daily    busPIRone (Buspar) 10 mg tablet 2 tablets each morning and 1 tablet each afternoon and 2 tablets each afternoon    chlorhexidine (Hibiclens) 4 % external liquid Use as directed daily preoperatively    chlorhexidine (Peridex) 0.12 % solution Swish and spit with 15ml of solution the night before and morning of surgery. Do not swallow.    clonazePAM (KlonoPIN) 1 mg tablet 1/2 tablet daily each morning and " 1 tablet daily each night , this is a reduced dose    colchicine 0.6 mg tablet 1 tablet, oral, Daily    escitalopram (LEXAPRO) 20 mg, oral, Daily    escitalopram (LEXAPRO) 20 mg, oral, Daily    ezetimibe (Zetia) 10 mg tablet 1 tablet, oral, Daily    folic acid (Folvite) 1 mg tablet 1 tablet, oral, Daily    FreeStyle Lancets 28 gauge 1 each, Daily    FreeStyle Test strip Check glucose twice daily. May dispense formulary equivalent. Dx: IDDM E11.9    gabapentin (Neurontin) 300 mg capsule 1 capsule, oral, 2 times daily    insulin degludec (Tresiba FlexTouch) 100 unit/mL (3 mL) injection Inject 30 units SC once daily at bedtime. Adjust by 2 units every 4 days to achieve fasting glucose < 150, and glucose never lower than 100.    levothyroxine (SYNTHROID, LEVOXYL) 75 mcg, oral, Daily    metFORMIN XR (Glucophage-XR) 500 mg 24 hr tablet 3 tablets, oral, Daily    methocarbamol (Robaxin) 750 mg tablet 1 tablet, oral, Every 4 hours    methotrexate (Trexall) 2.5 mg tablet TAKE 6 TABLET Weekly    nicotine (Nicoderm CQ) 7 mg/24 hr patch 1 patch, transdermal, Every 24 hours    nicotine polacrilex (COMMIT) 4 mg, Mouth/Throat, Every 2 hour PRN    nicotine polacrilex (NICORETTE) 2 mg, Mouth/Throat, Every 2 hour PRN    omeprazole (PRILOSEC) 40 mg, oral, 2 times daily    polyethylene glycol (GLYCOLAX, MIRALAX) 17 g, oral, 2 times daily, IN 8 OUNCES OF WATER AND DRINK    primidone (Mysoline) 50 mg tablet 2 tablets, oral, 2 times daily    simvastatin (ZOCOR) 40 mg, oral, Daily    traZODone (Desyrel) 50 mg tablet TAKE 1 -2 TABLETS AT BEDTIME    Trelegy Ellipta 100-62.5-25 mcg blister with device INHALE 1 DOSE ORALLY EVERY DAY (RINSE MOUTH AFTER USE)         Physical Exam:  Constitutional: alert and in no acute distress.   Eyes: no erythema, swelling or discharge from the eye .   Neck: neck is supple, symmetric, trachea midline, no masses  and no thyromegaly .   Pulmonary: No increased work of breathing or signs of respiratory distress   "  Lungs clear to auscultation.    No friction rub.  Cardiovascular: carotid pulses 2+ bilaterally with no bruit    JVP was normal, no thrills ,   Regular rhythm, normal S1 and S2, no murmurs    Pedal pulses 2+ bilaterally    No edema .   Abdomen: abdomen non-tender, no masses  and no hepatomegaly . No pulsatile mass noted  Skin: pale. skin warm and dry, normal skin turgor .   Psychiatric judgment and insight is normal  and oriented to person, place and time .     +neck pain with movement     Last Labs:  CBC -          Lab Results   Component Value Date     WBC 11.5 (H) 07/01/2024     HGB 14.9 07/01/2024     HCT 45.8 07/01/2024      (H) 07/01/2024     PLT   07/01/2024         Comment:         PLATELET CLUMPS PRECLUDE QUANTITATION. PLATELET ESTIMATE APPEARS ADEQUATE         CMP -        Lab Results   Component Value Date     CALCIUM 9.6 07/01/2024     PROT 6.7 07/01/2024     ALBUMIN 3.9 07/01/2024     AST 15 07/01/2024     ALT 18 07/01/2024     ALKPHOS 83 07/01/2024     BILITOT 0.4 07/01/2024         LIPID PANEL -   No results found for: \"CHOL\", \"TRIG\", \"HDL\", \"CHHDL\", \"LDLF\", \"VLDL\", \"NHDL\"     RENAL FUNCTION PANEL -         Lab Results   Component Value Date     GLUCOSE 133 (H) 07/01/2024      07/01/2024     K 4.5 07/01/2024      07/01/2024     CO2 29 07/01/2024     ANIONGAP 13 07/01/2024     BUN 38 (H) 07/01/2024     CREATININE 0.45 (L) 07/01/2024     CALCIUM 9.6 07/01/2024     ALBUMIN 3.9 07/01/2024               Lab Results   Component Value Date     HGBA1C 6.7 (H) 07/01/2024     HGBA1C 7.4 (A) 05/16/2024         Last Cardiology Tests:  ECG:  No results found for this or any previous visit from the past 1095 days.        Echo:  Transthoracic Echo (TTE) Complete 05/21/2024        Ejection Fractions:  No results found for: \"EF\"     Cath:  No results found for this or any previous visit from the past 1095 days.        Stress Test:  No results found for this or any previous visit from the past 1095 " days.        Cardiac Imaging:  Right                        Left    PSV      EDV                PSV      EDV  85 cm/s            CCA P    81 cm/s  67 cm/s            CCA D    63 cm/s  251 cm/s 56 cm/s   ICA P    114 cm/s 43 cm/s  182 cm/s 38 cm/s   ICA M    100 cm/s 35 cm/s  96 cm/s  27 cm/s   ICA D    96 cm/s  15 cm/s  143 cm/s            ECA     251 cm/s  59 cm/s  9 cm/s  Vertebral  74 cm/s  15 cm/s  139 cm/s         Subclavian 129 cm/s                   Right Left  ICA/CCA Ratio  3.7  1.8                    Assessment/Plan  CAROTID stenosis:  Carotid duplex scan shows worsening stenosis to the right ICA.  Case discussed with Dr Chavez.  The is not prohibitive from her undergoing cervical neck surgery, however I will have her repeat her carotid duplex scan and will place a referral to Dr Andrews for further evaluation and treatment.         LUIGI Fox            Deleted by: LUIGI Fox at 7/10/2024  9:32 AM

## 2024-07-10 NOTE — PROGRESS NOTES
"Sweta Lim is a 68 y.o. female     History Of Present Illness   HPI       Social HX  Social History     Tobacco Use    Smoking status: Every Day     Current packs/day: 1.00     Average packs/day: 1 pack/day for 52.5 years (52.5 ttl pk-yrs)     Types: Cigarettes     Start date: 1972    Smokeless tobacco: Never   Substance Use Topics    Alcohol use: Not Currently    Drug use: Never          Family HX  Family History   Problem Relation Name Age of Onset    Hip fracture Mother      Dementia Mother      Hypertension Mother      Cancer Father      Cancer Maternal Grandmother      Diabetes Paternal Grandmother      Hypertension Other Grandparent     Diabetes Other            Review Of Systems   Review of Systems       Allergies  Allergies   Allergen Reactions    Codeine Nausea Only          Vitals  Visit Vitals  /68 (BP Location: Left arm, Patient Position: Sitting, BP Cuff Size: Adult)   Pulse 62           Physical Exam  Physical Exam       Current/Home Meds    Current Outpatient Medications:     alendronate (Fosamax) 70 mg tablet, Take 1 tablet (70 mg) by mouth every 7 days. BEFORE FIRST FOOD, DRINK OR MEDICINE OF THE DAY. DISSOLVE IN 4OZ OF WATER, Disp: , Rfl:     BD Stefanie 2nd Gen Pen Needle 32 gauge x 5/32\" needle, 1 Needle once daily., Disp: , Rfl:     busPIRone (Buspar) 10 mg tablet, 2 tablets each morning and 1 tablet each afternoon and 2 tablets each afternoon, Disp: 450 tablet, Rfl: 1    chlorhexidine (Hibiclens) 4 % external liquid, Use as directed daily preoperatively, Disp: 473 mL, Rfl: 0    chlorhexidine (Peridex) 0.12 % solution, Swish and spit with 15ml of solution the night before and morning of surgery. Do not swallow., Disp: 15 mL, Rfl: 0    clonazePAM (KlonoPIN) 1 mg tablet, 1/2 tablet daily each morning and 1 tablet daily each night , this is a reduced dose, Disp: 45 tablet, Rfl: 0    colchicine 0.6 mg tablet, Take 1 tablet (0.6 mg) by mouth once daily., Disp: , Rfl:     escitalopram (Lexapro) " 20 mg tablet, Take 1 tablet (20 mg) by mouth once daily. (Patient not taking: Reported on 5/28/2024), Disp: 90 tablet, Rfl: 1    escitalopram (Lexapro) 20 mg tablet, Take 1 tablet (20 mg) by mouth once daily., Disp: 90 tablet, Rfl: 0    ezetimibe (Zetia) 10 mg tablet, Take 1 tablet (10 mg) by mouth once daily., Disp: , Rfl:     folic acid (Folvite) 1 mg tablet, Take 1 tablet (1 mg) by mouth once daily., Disp: , Rfl:     FreeStyle Lancets 28 gauge, 1 each once daily., Disp: , Rfl:     FreeStyle Test strip, Check glucose twice daily. May dispense formulary equivalent. Dx: IDDM E11.9, Disp: , Rfl:     gabapentin (Neurontin) 300 mg capsule, Take 1 capsule (300 mg) by mouth 2 times a day., Disp: , Rfl:     insulin degludec (Tresiba FlexTouch) 100 unit/mL (3 mL) injection, Inject 30 units SC once daily at bedtime. Adjust by 2 units every 4 days to achieve fasting glucose < 150, and glucose never lower than 100., Disp: , Rfl:     levothyroxine (Synthroid, Levoxyl) 75 mcg tablet, Take 1 tablet (75 mcg) by mouth once daily., Disp: , Rfl:     metFORMIN XR (Glucophage-XR) 500 mg 24 hr tablet, Take 3 tablets (1,500 mg) by mouth once daily., Disp: , Rfl:     methocarbamol (Robaxin) 750 mg tablet, Take 1 tablet (750 mg) by mouth every 4 hours., Disp: , Rfl:     methotrexate (Trexall) 2.5 mg tablet, TAKE 6 TABLET Weekly, Disp: , Rfl:     nicotine (Nicoderm CQ) 7 mg/24 hr patch, Place 1 patch over 24 hours on the skin once every 24 hours., Disp: 30 patch, Rfl: 3    nicotine polacrilex (Commit) 4 mg lozenge, Dissolve 1 lozenge (4 mg) in the mouth every 2 hours if needed for smoking cessation., Disp: 100 lozenge, Rfl: 0    nicotine polacrilex (Nicorette) 2 mg lozenge, Use 1 lozenge (2 mg) in the mouth or throat every 2 hours if needed for smoking cessation., Disp: 200 lozenge, Rfl: 5    omeprazole (PriLOSEC) 40 mg DR capsule, TAKE 1 CAPSULE BY MOUTH TWICE A DAY, Disp: 180 capsule, Rfl: 2    polyethylene glycol (Glycolax, Miralax) 17  "gram/dose powder, Take 17 g by mouth twice a day. IN 8 OUNCES OF WATER AND DRINK, Disp: , Rfl:     primidone (Mysoline) 50 mg tablet, Take 2 tablets (100 mg) by mouth 2 times a day., Disp: , Rfl:     simvastatin (Zocor) 40 mg tablet, TAKE 1 TABLET BY MOUTH EVERY DAY (Patient not taking: Reported on 5/28/2024), Disp: 90 tablet, Rfl: 2    traZODone (Desyrel) 50 mg tablet, TAKE 1 -2 TABLETS AT BEDTIME, Disp: 180 tablet, Rfl: 0    Trelegy Ellipta 100-62.5-25 mcg blister with device, INHALE 1 DOSE ORALLY EVERY DAY (RINSE MOUTH AFTER USE), Disp: , Rfl:     Current Facility-Administered Medications:     nicotine (Nicoderm CQ) 21 mg/24 hr patch 1 patch, 1 patch, transdermal, Once, Nelson Brown, DO       Labs           EKG Findings  EKG: {ekg findings:125560::\"normal EKG, normal sinus rhythm\",\"unchanged from previous tracings\"}.    {cardiac diagnosis history:74023}.        Cardiac Service Results:  No results found for this or any previous visit from the past 365 days.        Assessment/Plan      {Assess/Plan SmartLinks (Optional):67595::\"***\"}    "

## 2024-07-11 ENCOUNTER — HOSPITAL ENCOUNTER (OUTPATIENT)
Dept: RADIOLOGY | Facility: HOSPITAL | Age: 69
Discharge: HOME | End: 2024-07-11
Payer: MEDICARE

## 2024-07-11 DIAGNOSIS — M50.00 CERVICAL DISC DISEASE WITH MYELOPATHY: ICD-10-CM

## 2024-07-11 PROCEDURE — 72125 CT NECK SPINE W/O DYE: CPT

## 2024-07-12 ENCOUNTER — TELEPHONE (OUTPATIENT)
Dept: PRIMARY CARE | Facility: CLINIC | Age: 69
End: 2024-07-12
Payer: MEDICARE

## 2024-07-12 NOTE — TELEPHONE ENCOUNTER
Nonverbal and bed-bound at baseline     Patient called and is having neck surgery on 7/17 at [facility].  She just wanted you to be aware.

## 2024-07-16 ENCOUNTER — PREP FOR PROCEDURE (OUTPATIENT)
Dept: ORTHOPEDIC SURGERY | Facility: HOSPITAL | Age: 69
End: 2024-07-16
Payer: MEDICARE

## 2024-07-17 ENCOUNTER — ANESTHESIA EVENT (OUTPATIENT)
Dept: OPERATING ROOM | Facility: HOSPITAL | Age: 69
End: 2024-07-17
Payer: MEDICARE

## 2024-07-17 ENCOUNTER — HOSPITAL ENCOUNTER (INPATIENT)
Facility: HOSPITAL | Age: 69
End: 2024-07-17
Attending: NEUROLOGICAL SURGERY | Admitting: NEUROLOGICAL SURGERY
Payer: MEDICARE

## 2024-07-17 ENCOUNTER — APPOINTMENT (OUTPATIENT)
Dept: RADIOLOGY | Facility: HOSPITAL | Age: 69
End: 2024-07-17
Payer: MEDICARE

## 2024-07-17 ENCOUNTER — ANESTHESIA (OUTPATIENT)
Dept: OPERATING ROOM | Facility: HOSPITAL | Age: 69
End: 2024-07-17
Payer: MEDICARE

## 2024-07-17 DIAGNOSIS — F17.200 TOBACCO USE DISORDER: ICD-10-CM

## 2024-07-17 DIAGNOSIS — F41.1 GAD (GENERALIZED ANXIETY DISORDER): ICD-10-CM

## 2024-07-17 DIAGNOSIS — K59.03 CONSTIPATION DUE TO PAIN MEDICATION: ICD-10-CM

## 2024-07-17 DIAGNOSIS — M47.12 SPONDYLOSIS, CERVICAL, WITH MYELOPATHY: ICD-10-CM

## 2024-07-17 DIAGNOSIS — G95.9 CERVICAL MYELOPATHY (MULTI): ICD-10-CM

## 2024-07-17 DIAGNOSIS — E11.9 TYPE 2 DIABETES MELLITUS WITHOUT COMPLICATION, WITHOUT LONG-TERM CURRENT USE OF INSULIN (MULTI): ICD-10-CM

## 2024-07-17 DIAGNOSIS — M06.9 RHEUMATOID ARTHRITIS, INVOLVING UNSPECIFIED SITE, UNSPECIFIED WHETHER RHEUMATOID FACTOR PRESENT (MULTI): ICD-10-CM

## 2024-07-17 DIAGNOSIS — M48.02 DEGENERATIVE CERVICAL SPINAL STENOSIS: Primary | ICD-10-CM

## 2024-07-17 DIAGNOSIS — R13.10 DYSPHAGIA, UNSPECIFIED TYPE: ICD-10-CM

## 2024-07-17 DIAGNOSIS — G89.18 POSTOPERATIVE PAIN: ICD-10-CM

## 2024-07-17 DIAGNOSIS — Z79.899 ON DEEP VEIN THROMBOSIS (DVT) PROPHYLAXIS: ICD-10-CM

## 2024-07-17 DIAGNOSIS — S10.93XS HEMATOMA OF NECK, SEQUELA: ICD-10-CM

## 2024-07-17 LAB
GLUCOSE BLD MANUAL STRIP-MCNC: 116 MG/DL (ref 74–99)
GLUCOSE BLD MANUAL STRIP-MCNC: 184 MG/DL (ref 74–99)
GLUCOSE BLD MANUAL STRIP-MCNC: 216 MG/DL (ref 74–99)

## 2024-07-17 PROCEDURE — 2720000007 HC OR 272 NO HCPCS: Performed by: NEUROLOGICAL SURGERY

## 2024-07-17 PROCEDURE — 82947 ASSAY GLUCOSE BLOOD QUANT: CPT

## 2024-07-17 PROCEDURE — C1776 JOINT DEVICE (IMPLANTABLE): HCPCS | Performed by: NEUROLOGICAL SURGERY

## 2024-07-17 PROCEDURE — 2500000005 HC RX 250 GENERAL PHARMACY W/O HCPCS: Performed by: NEUROLOGICAL SURGERY

## 2024-07-17 PROCEDURE — 2500000005 HC RX 250 GENERAL PHARMACY W/O HCPCS: Performed by: NURSE ANESTHETIST, CERTIFIED REGISTERED

## 2024-07-17 PROCEDURE — 2500000004 HC RX 250 GENERAL PHARMACY W/ HCPCS (ALT 636 FOR OP/ED)

## 2024-07-17 PROCEDURE — 3700000001 HC GENERAL ANESTHESIA TIME - INITIAL BASE CHARGE: Performed by: NEUROLOGICAL SURGERY

## 2024-07-17 PROCEDURE — 7100000001 HC RECOVERY ROOM TIME - INITIAL BASE CHARGE: Performed by: NEUROLOGICAL SURGERY

## 2024-07-17 PROCEDURE — 3700000002 HC GENERAL ANESTHESIA TIME - EACH INCREMENTAL 1 MINUTE: Performed by: NEUROLOGICAL SURGERY

## 2024-07-17 PROCEDURE — 0RB30ZZ EXCISION OF CERVICAL VERTEBRAL DISC, OPEN APPROACH: ICD-10-PCS | Performed by: NEUROLOGICAL SURGERY

## 2024-07-17 PROCEDURE — 7100000011 HC EXTENDED STAY RECOVERY HOURLY - NURSING UNIT

## 2024-07-17 PROCEDURE — 3600000018 HC OR TIME - INITIAL BASE CHARGE - PROCEDURE LEVEL SIX: Performed by: NEUROLOGICAL SURGERY

## 2024-07-17 PROCEDURE — 22853 INSJ BIOMECHANICAL DEVICE: CPT | Performed by: NEUROLOGICAL SURGERY

## 2024-07-17 PROCEDURE — 0RG10A0 FUSION OF CERVICAL VERTEBRAL JOINT WITH INTERBODY FUSION DEVICE, ANTERIOR APPROACH, ANTERIOR COLUMN, OPEN APPROACH: ICD-10-PCS | Performed by: NEUROLOGICAL SURGERY

## 2024-07-17 PROCEDURE — C1713 ANCHOR/SCREW BN/BN,TIS/BN: HCPCS | Performed by: NEUROLOGICAL SURGERY

## 2024-07-17 PROCEDURE — 3600000017 HC OR TIME - EACH INCREMENTAL 1 MINUTE - PROCEDURE LEVEL SIX: Performed by: NEUROLOGICAL SURGERY

## 2024-07-17 PROCEDURE — 2500000005 HC RX 250 GENERAL PHARMACY W/O HCPCS

## 2024-07-17 PROCEDURE — 2500000004 HC RX 250 GENERAL PHARMACY W/ HCPCS (ALT 636 FOR OP/ED): Performed by: NURSE ANESTHETIST, CERTIFIED REGISTERED

## 2024-07-17 PROCEDURE — 7100000002 HC RECOVERY ROOM TIME - EACH INCREMENTAL 1 MINUTE: Performed by: NEUROLOGICAL SURGERY

## 2024-07-17 PROCEDURE — 22551 ARTHRD ANT NTRBDY CERVICAL: CPT | Performed by: NEUROLOGICAL SURGERY

## 2024-07-17 PROCEDURE — 2780000003 HC OR 278 NO HCPCS: Performed by: NEUROLOGICAL SURGERY

## 2024-07-17 DEVICE — PUTTY ATTRAX, 1CC US: Type: IMPLANTABLE DEVICE | Site: SPINE CERVICAL | Status: FUNCTIONAL

## 2024-07-17 DEVICE — IMPLANTABLE DEVICE: Type: IMPLANTABLE DEVICE | Site: SPINE CERVICAL | Status: FUNCTIONAL

## 2024-07-17 RX ORDER — CYCLOBENZAPRINE HCL 10 MG
10 TABLET ORAL 3 TIMES DAILY
Status: DISCONTINUED | OUTPATIENT
Start: 2024-07-17 | End: 2024-07-27 | Stop reason: HOSPADM

## 2024-07-17 RX ORDER — SIMVASTATIN 20 MG/1
40 TABLET, FILM COATED ORAL DAILY
Status: DISCONTINUED | OUTPATIENT
Start: 2024-07-18 | End: 2024-07-27 | Stop reason: HOSPADM

## 2024-07-17 RX ORDER — FLUTICASONE FUROATE AND VILANTEROL 100; 25 UG/1; UG/1
1 POWDER RESPIRATORY (INHALATION)
Status: DISCONTINUED | OUTPATIENT
Start: 2024-07-18 | End: 2024-07-27 | Stop reason: HOSPADM

## 2024-07-17 RX ORDER — DEXTROSE 50 % IN WATER (D50W) INTRAVENOUS SYRINGE
25
Status: DISCONTINUED | OUTPATIENT
Start: 2024-07-17 | End: 2024-07-17

## 2024-07-17 RX ORDER — GLYCOPYRROLATE 0.2 MG/ML
INJECTION INTRAMUSCULAR; INTRAVENOUS AS NEEDED
Status: DISCONTINUED | OUTPATIENT
Start: 2024-07-17 | End: 2024-07-17

## 2024-07-17 RX ORDER — LEVOTHYROXINE SODIUM 75 UG/1
75 TABLET ORAL DAILY
Status: DISCONTINUED | OUTPATIENT
Start: 2024-07-18 | End: 2024-07-27 | Stop reason: HOSPADM

## 2024-07-17 RX ORDER — HYDRALAZINE HYDROCHLORIDE 20 MG/ML
5 INJECTION INTRAMUSCULAR; INTRAVENOUS EVERY 30 MIN PRN
Status: DISCONTINUED | OUTPATIENT
Start: 2024-07-17 | End: 2024-07-17 | Stop reason: HOSPADM

## 2024-07-17 RX ORDER — ESCITALOPRAM OXALATE 10 MG/1
20 TABLET ORAL DAILY
Status: DISCONTINUED | OUTPATIENT
Start: 2024-07-18 | End: 2024-07-17

## 2024-07-17 RX ORDER — HYDROMORPHONE HYDROCHLORIDE 1 MG/ML
0.2 INJECTION, SOLUTION INTRAMUSCULAR; INTRAVENOUS; SUBCUTANEOUS EVERY 5 MIN PRN
Status: DISCONTINUED | OUTPATIENT
Start: 2024-07-17 | End: 2024-07-17 | Stop reason: HOSPADM

## 2024-07-17 RX ORDER — FOLIC ACID 1 MG/1
1 TABLET ORAL DAILY
Status: DISCONTINUED | OUTPATIENT
Start: 2024-07-18 | End: 2024-07-27 | Stop reason: HOSPADM

## 2024-07-17 RX ORDER — INSULIN DEGLUDEC 100 U/ML
15 INJECTION, SOLUTION SUBCUTANEOUS NIGHTLY
Status: DISCONTINUED | OUTPATIENT
Start: 2024-07-17 | End: 2024-07-27 | Stop reason: HOSPADM

## 2024-07-17 RX ORDER — OXYCODONE HYDROCHLORIDE 5 MG/1
10 TABLET ORAL EVERY 4 HOURS PRN
Status: DISCONTINUED | OUTPATIENT
Start: 2024-07-17 | End: 2024-07-17 | Stop reason: HOSPADM

## 2024-07-17 RX ORDER — DEXTROSE 50 % IN WATER (D50W) INTRAVENOUS SYRINGE
12.5
Status: DISCONTINUED | OUTPATIENT
Start: 2024-07-17 | End: 2024-07-17

## 2024-07-17 RX ORDER — ALBUTEROL SULFATE 0.83 MG/ML
2.5 SOLUTION RESPIRATORY (INHALATION) ONCE AS NEEDED
Status: DISCONTINUED | OUTPATIENT
Start: 2024-07-17 | End: 2024-07-17 | Stop reason: HOSPADM

## 2024-07-17 RX ORDER — PRIMIDONE 50 MG/1
100 TABLET ORAL 2 TIMES DAILY
Status: DISCONTINUED | OUTPATIENT
Start: 2024-07-17 | End: 2024-07-27 | Stop reason: HOSPADM

## 2024-07-17 RX ORDER — OXYCODONE HYDROCHLORIDE 5 MG/1
5 TABLET ORAL EVERY 4 HOURS PRN
Status: DISCONTINUED | OUTPATIENT
Start: 2024-07-17 | End: 2024-07-27 | Stop reason: HOSPADM

## 2024-07-17 RX ORDER — ONDANSETRON HYDROCHLORIDE 2 MG/ML
INJECTION, SOLUTION INTRAVENOUS AS NEEDED
Status: DISCONTINUED | OUTPATIENT
Start: 2024-07-17 | End: 2024-07-17

## 2024-07-17 RX ORDER — LIDOCAINE HYDROCHLORIDE AND EPINEPHRINE 5; 5 MG/ML; UG/ML
INJECTION, SOLUTION INFILTRATION; PERINEURAL AS NEEDED
Status: DISCONTINUED | OUTPATIENT
Start: 2024-07-17 | End: 2024-07-17 | Stop reason: HOSPADM

## 2024-07-17 RX ORDER — DEXTROSE 50 % IN WATER (D50W) INTRAVENOUS SYRINGE
25
Status: DISCONTINUED | OUTPATIENT
Start: 2024-07-17 | End: 2024-07-27 | Stop reason: HOSPADM

## 2024-07-17 RX ORDER — INSULIN LISPRO 100 [IU]/ML
0-10 INJECTION, SOLUTION INTRAVENOUS; SUBCUTANEOUS
Status: DISCONTINUED | OUTPATIENT
Start: 2024-07-18 | End: 2024-07-27 | Stop reason: HOSPADM

## 2024-07-17 RX ORDER — FENTANYL CITRATE 50 UG/ML
INJECTION, SOLUTION INTRAMUSCULAR; INTRAVENOUS AS NEEDED
Status: DISCONTINUED | OUTPATIENT
Start: 2024-07-17 | End: 2024-07-17

## 2024-07-17 RX ORDER — ESCITALOPRAM OXALATE 10 MG/1
20 TABLET ORAL DAILY
Status: DISCONTINUED | OUTPATIENT
Start: 2024-07-18 | End: 2024-07-27 | Stop reason: HOSPADM

## 2024-07-17 RX ORDER — PROPOFOL 10 MG/ML
INJECTION, EMULSION INTRAVENOUS AS NEEDED
Status: DISCONTINUED | OUTPATIENT
Start: 2024-07-17 | End: 2024-07-17

## 2024-07-17 RX ORDER — ONDANSETRON HYDROCHLORIDE 2 MG/ML
4 INJECTION, SOLUTION INTRAVENOUS ONCE AS NEEDED
Status: COMPLETED | OUTPATIENT
Start: 2024-07-17 | End: 2024-07-17

## 2024-07-17 RX ORDER — ACETAMINOPHEN 325 MG/1
975 TABLET ORAL ONCE
Status: COMPLETED | OUTPATIENT
Start: 2024-07-17 | End: 2024-07-17

## 2024-07-17 RX ORDER — NITROGLYCERIN 40 MG/100ML
INJECTION INTRAVENOUS AS NEEDED
Status: DISCONTINUED | OUTPATIENT
Start: 2024-07-17 | End: 2024-07-17

## 2024-07-17 RX ORDER — SODIUM CHLORIDE, SODIUM LACTATE, POTASSIUM CHLORIDE, CALCIUM CHLORIDE 600; 310; 30; 20 MG/100ML; MG/100ML; MG/100ML; MG/100ML
INJECTION, SOLUTION INTRAVENOUS CONTINUOUS PRN
Status: DISCONTINUED | OUTPATIENT
Start: 2024-07-17 | End: 2024-07-17

## 2024-07-17 RX ORDER — HEPARIN SODIUM 5000 [USP'U]/ML
5000 INJECTION, SOLUTION INTRAVENOUS; SUBCUTANEOUS EVERY 8 HOURS
Status: DISCONTINUED | OUTPATIENT
Start: 2024-07-18 | End: 2024-07-27 | Stop reason: HOSPADM

## 2024-07-17 RX ORDER — ROCURONIUM BROMIDE 10 MG/ML
INJECTION, SOLUTION INTRAVENOUS AS NEEDED
Status: DISCONTINUED | OUTPATIENT
Start: 2024-07-17 | End: 2024-07-17

## 2024-07-17 RX ORDER — SODIUM CHLORIDE, SODIUM LACTATE, POTASSIUM CHLORIDE, CALCIUM CHLORIDE 600; 310; 30; 20 MG/100ML; MG/100ML; MG/100ML; MG/100ML
100 INJECTION, SOLUTION INTRAVENOUS CONTINUOUS
Status: DISCONTINUED | OUTPATIENT
Start: 2024-07-17 | End: 2024-07-17 | Stop reason: HOSPADM

## 2024-07-17 RX ORDER — LIDOCAINE HYDROCHLORIDE 20 MG/ML
INJECTION, SOLUTION INFILTRATION; PERINEURAL AS NEEDED
Status: DISCONTINUED | OUTPATIENT
Start: 2024-07-17 | End: 2024-07-17

## 2024-07-17 RX ORDER — CEFAZOLIN 1 G/1
INJECTION, POWDER, FOR SOLUTION INTRAVENOUS AS NEEDED
Status: DISCONTINUED | OUTPATIENT
Start: 2024-07-17 | End: 2024-07-17

## 2024-07-17 RX ORDER — OXYCODONE HYDROCHLORIDE 5 MG/1
5 TABLET ORAL EVERY 4 HOURS PRN
Status: DISCONTINUED | OUTPATIENT
Start: 2024-07-17 | End: 2024-07-17 | Stop reason: HOSPADM

## 2024-07-17 RX ORDER — BUSPIRONE HYDROCHLORIDE 10 MG/1
10 TABLET ORAL 2 TIMES DAILY
Status: DISCONTINUED | OUTPATIENT
Start: 2024-07-17 | End: 2024-07-18 | Stop reason: DRUGHIGH

## 2024-07-17 RX ORDER — ONDANSETRON 4 MG/1
4 TABLET, ORALLY DISINTEGRATING ORAL EVERY 8 HOURS PRN
Status: DISCONTINUED | OUTPATIENT
Start: 2024-07-17 | End: 2024-07-27 | Stop reason: HOSPADM

## 2024-07-17 RX ORDER — EZETIMIBE 10 MG/1
10 TABLET ORAL DAILY
Status: DISCONTINUED | OUTPATIENT
Start: 2024-07-18 | End: 2024-07-27 | Stop reason: HOSPADM

## 2024-07-17 RX ORDER — PANTOPRAZOLE SODIUM 40 MG/1
40 TABLET, DELAYED RELEASE ORAL
Status: DISCONTINUED | OUTPATIENT
Start: 2024-07-18 | End: 2024-07-24

## 2024-07-17 RX ORDER — ONDANSETRON HYDROCHLORIDE 2 MG/ML
4 INJECTION, SOLUTION INTRAVENOUS EVERY 8 HOURS PRN
Status: DISCONTINUED | OUTPATIENT
Start: 2024-07-17 | End: 2024-07-27 | Stop reason: HOSPADM

## 2024-07-17 RX ORDER — HYDROMORPHONE HYDROCHLORIDE 1 MG/ML
0.5 INJECTION, SOLUTION INTRAMUSCULAR; INTRAVENOUS; SUBCUTANEOUS EVERY 4 HOURS PRN
Status: DISCONTINUED | OUTPATIENT
Start: 2024-07-17 | End: 2024-07-23

## 2024-07-17 RX ORDER — GABAPENTIN 300 MG/1
300 CAPSULE ORAL 2 TIMES DAILY
Status: DISCONTINUED | OUTPATIENT
Start: 2024-07-17 | End: 2024-07-27 | Stop reason: HOSPADM

## 2024-07-17 RX ORDER — POLYETHYLENE GLYCOL 3350 17 G/17G
17 POWDER, FOR SOLUTION ORAL DAILY
Status: DISCONTINUED | OUTPATIENT
Start: 2024-07-18 | End: 2024-07-17

## 2024-07-17 RX ORDER — HYDROMORPHONE HYDROCHLORIDE 1 MG/ML
0.5 INJECTION, SOLUTION INTRAMUSCULAR; INTRAVENOUS; SUBCUTANEOUS EVERY 5 MIN PRN
Status: DISCONTINUED | OUTPATIENT
Start: 2024-07-17 | End: 2024-07-17 | Stop reason: HOSPADM

## 2024-07-17 RX ORDER — NALOXONE HYDROCHLORIDE 0.4 MG/ML
0.2 INJECTION, SOLUTION INTRAMUSCULAR; INTRAVENOUS; SUBCUTANEOUS EVERY 5 MIN PRN
Status: DISCONTINUED | OUTPATIENT
Start: 2024-07-17 | End: 2024-07-27 | Stop reason: HOSPADM

## 2024-07-17 RX ORDER — VANCOMYCIN HYDROCHLORIDE 1 G/20ML
INJECTION, POWDER, LYOPHILIZED, FOR SOLUTION INTRAVENOUS AS NEEDED
Status: DISCONTINUED | OUTPATIENT
Start: 2024-07-17 | End: 2024-07-17

## 2024-07-17 RX ORDER — AMOXICILLIN 250 MG
2 CAPSULE ORAL 2 TIMES DAILY
Status: DISCONTINUED | OUTPATIENT
Start: 2024-07-17 | End: 2024-07-27 | Stop reason: HOSPADM

## 2024-07-17 RX ORDER — LABETALOL HYDROCHLORIDE 5 MG/ML
5 INJECTION, SOLUTION INTRAVENOUS ONCE AS NEEDED
Status: DISCONTINUED | OUTPATIENT
Start: 2024-07-17 | End: 2024-07-17 | Stop reason: HOSPADM

## 2024-07-17 RX ORDER — PHENYLEPHRINE HCL IN 0.9% NACL 0.4MG/10ML
SYRINGE (ML) INTRAVENOUS AS NEEDED
Status: DISCONTINUED | OUTPATIENT
Start: 2024-07-17 | End: 2024-07-17

## 2024-07-17 RX ORDER — HYDRALAZINE HYDROCHLORIDE 20 MG/ML
INJECTION INTRAMUSCULAR; INTRAVENOUS AS NEEDED
Status: DISCONTINUED | OUTPATIENT
Start: 2024-07-17 | End: 2024-07-17

## 2024-07-17 RX ORDER — TRAZODONE HYDROCHLORIDE 50 MG/1
50 TABLET ORAL NIGHTLY PRN
Status: DISCONTINUED | OUTPATIENT
Start: 2024-07-17 | End: 2024-07-27 | Stop reason: HOSPADM

## 2024-07-17 RX ORDER — DEXTROSE 50 % IN WATER (D50W) INTRAVENOUS SYRINGE
12.5
Status: DISCONTINUED | OUTPATIENT
Start: 2024-07-17 | End: 2024-07-27 | Stop reason: HOSPADM

## 2024-07-17 RX ORDER — POLYETHYLENE GLYCOL 3350 17 G/17G
17 POWDER, FOR SOLUTION ORAL DAILY
Status: DISCONTINUED | OUTPATIENT
Start: 2024-07-18 | End: 2024-07-24

## 2024-07-17 RX ORDER — OXYCODONE HYDROCHLORIDE 5 MG/1
10 TABLET ORAL EVERY 4 HOURS PRN
Status: DISCONTINUED | OUTPATIENT
Start: 2024-07-17 | End: 2024-07-27 | Stop reason: HOSPADM

## 2024-07-17 RX ORDER — IBUPROFEN 200 MG
1 TABLET ORAL ONCE
Status: DISCONTINUED | OUTPATIENT
Start: 2024-07-17 | End: 2024-07-27 | Stop reason: HOSPADM

## 2024-07-17 RX ORDER — ACETAMINOPHEN 325 MG/1
975 TABLET ORAL EVERY 6 HOURS PRN
Status: DISCONTINUED | OUTPATIENT
Start: 2024-07-17 | End: 2024-07-17 | Stop reason: HOSPADM

## 2024-07-17 RX ORDER — ACETAMINOPHEN 325 MG/1
650 TABLET ORAL EVERY 6 HOURS
Status: DISCONTINUED | OUTPATIENT
Start: 2024-07-17 | End: 2024-07-27 | Stop reason: HOSPADM

## 2024-07-17 RX ORDER — MIDAZOLAM HYDROCHLORIDE 1 MG/ML
INJECTION INTRAMUSCULAR; INTRAVENOUS AS NEEDED
Status: DISCONTINUED | OUTPATIENT
Start: 2024-07-17 | End: 2024-07-17

## 2024-07-17 RX ORDER — CLONAZEPAM 0.5 MG/1
0.5 TABLET ORAL
Status: DISCONTINUED | OUTPATIENT
Start: 2024-07-17 | End: 2024-07-27 | Stop reason: HOSPADM

## 2024-07-17 RX ORDER — HYDROMORPHONE HYDROCHLORIDE 1 MG/ML
INJECTION, SOLUTION INTRAMUSCULAR; INTRAVENOUS; SUBCUTANEOUS AS NEEDED
Status: DISCONTINUED | OUTPATIENT
Start: 2024-07-17 | End: 2024-07-17

## 2024-07-17 SDOH — SOCIAL STABILITY: SOCIAL INSECURITY: DOES ANYONE TRY TO KEEP YOU FROM HAVING/CONTACTING OTHER FRIENDS OR DOING THINGS OUTSIDE YOUR HOME?: NO

## 2024-07-17 SDOH — SOCIAL STABILITY: SOCIAL INSECURITY: HAVE YOU HAD ANY THOUGHTS OF HARMING ANYONE ELSE?: NO

## 2024-07-17 SDOH — SOCIAL STABILITY: SOCIAL INSECURITY: WERE YOU ABLE TO COMPLETE ALL THE BEHAVIORAL HEALTH SCREENINGS?: YES

## 2024-07-17 SDOH — SOCIAL STABILITY: SOCIAL INSECURITY: DO YOU FEEL UNSAFE GOING BACK TO THE PLACE WHERE YOU ARE LIVING?: NO

## 2024-07-17 SDOH — SOCIAL STABILITY: SOCIAL INSECURITY: ARE YOU OR HAVE YOU BEEN THREATENED OR ABUSED PHYSICALLY, EMOTIONALLY, OR SEXUALLY BY ANYONE?: NO

## 2024-07-17 SDOH — SOCIAL STABILITY: SOCIAL INSECURITY: DO YOU FEEL ANYONE HAS EXPLOITED OR TAKEN ADVANTAGE OF YOU FINANCIALLY OR OF YOUR PERSONAL PROPERTY?: NO

## 2024-07-17 SDOH — SOCIAL STABILITY: SOCIAL INSECURITY: ABUSE: ADULT

## 2024-07-17 SDOH — SOCIAL STABILITY: SOCIAL INSECURITY: HAS ANYONE EVER THREATENED TO HURT YOUR FAMILY OR YOUR PETS?: NO

## 2024-07-17 SDOH — SOCIAL STABILITY: SOCIAL INSECURITY: ARE THERE ANY APPARENT SIGNS OF INJURIES/BEHAVIORS THAT COULD BE RELATED TO ABUSE/NEGLECT?: NO

## 2024-07-17 SDOH — HEALTH STABILITY: MENTAL HEALTH: CURRENT SMOKER: 1

## 2024-07-17 SDOH — SOCIAL STABILITY: SOCIAL INSECURITY: HAVE YOU HAD THOUGHTS OF HARMING ANYONE ELSE?: NO

## 2024-07-17 ASSESSMENT — ACTIVITIES OF DAILY LIVING (ADL)
DRESSING YOURSELF: INDEPENDENT
HEARING - LEFT EAR: HEARING AID
HEARING - RIGHT EAR: HEARING AID
BATHING: INDEPENDENT
JUDGMENT_ADEQUATE_SAFELY_COMPLETE_DAILY_ACTIVITIES: YES
WALKS IN HOME: NEEDS ASSISTANCE
GROOMING: INDEPENDENT
ADEQUATE_TO_COMPLETE_ADL: YES
PATIENT'S MEMORY ADEQUATE TO SAFELY COMPLETE DAILY ACTIVITIES?: YES
FEEDING YOURSELF: INDEPENDENT
ASSISTIVE_DEVICE: BRACE RLE;WALKER
TOILETING: INDEPENDENT
LACK_OF_TRANSPORTATION: PATIENT DECLINED

## 2024-07-17 ASSESSMENT — PAIN SCALES - GENERAL
PAIN_LEVEL: 10
PAINLEVEL_OUTOF10: 6
PAINLEVEL_OUTOF10: 9
PAINLEVEL_OUTOF10: 7
PAINLEVEL_OUTOF10: 9
PAINLEVEL_OUTOF10: 8
PAINLEVEL_OUTOF10: 5 - MODERATE PAIN
PAINLEVEL_OUTOF10: 7
PAINLEVEL_OUTOF10: 9
PAINLEVEL_OUTOF10: 7

## 2024-07-17 ASSESSMENT — PAIN - FUNCTIONAL ASSESSMENT
PAIN_FUNCTIONAL_ASSESSMENT: 0-10

## 2024-07-17 ASSESSMENT — COLUMBIA-SUICIDE SEVERITY RATING SCALE - C-SSRS
2. HAVE YOU ACTUALLY HAD ANY THOUGHTS OF KILLING YOURSELF?: NO
6. HAVE YOU EVER DONE ANYTHING, STARTED TO DO ANYTHING, OR PREPARED TO DO ANYTHING TO END YOUR LIFE?: NO
1. IN THE PAST MONTH, HAVE YOU WISHED YOU WERE DEAD OR WISHED YOU COULD GO TO SLEEP AND NOT WAKE UP?: NO

## 2024-07-17 ASSESSMENT — PAIN DESCRIPTION - DESCRIPTORS
DESCRIPTORS: TIGHTNESS

## 2024-07-17 ASSESSMENT — LIFESTYLE VARIABLES
HOW OFTEN DO YOU HAVE 6 OR MORE DRINKS ON ONE OCCASION: NEVER
HOW MANY STANDARD DRINKS CONTAINING ALCOHOL DO YOU HAVE ON A TYPICAL DAY: PATIENT DOES NOT DRINK
HOW OFTEN DO YOU HAVE A DRINK CONTAINING ALCOHOL: NEVER
AUDIT-C TOTAL SCORE: 0
SKIP TO QUESTIONS 9-10: 1
AUDIT-C TOTAL SCORE: 0

## 2024-07-17 ASSESSMENT — COGNITIVE AND FUNCTIONAL STATUS - GENERAL
PATIENT BASELINE BEDBOUND: NO
MOVING TO AND FROM BED TO CHAIR: A LITTLE
WALKING IN HOSPITAL ROOM: A LITTLE
STANDING UP FROM CHAIR USING ARMS: A LITTLE
MOBILITY SCORE: 20
CLIMB 3 TO 5 STEPS WITH RAILING: A LITTLE

## 2024-07-17 NOTE — OP NOTE
C4-5 Anterior cervical disectomy fusion Operative Note     Date: 2024  OR Location: [facility] OR    Name: Sweta Lim, : 1955, Age: 68 y.o., MRN: [id number], Sex: female    Diagnosis  Pre-op Diagnosis      * Degenerative cervical spinal stenosis [M48.02]     * Spondylosis, cervical, with myelopathy [M47.12] Post-op Diagnosis     * Degenerative cervical spinal stenosis [M48.02]     * Spondylosis, cervical, with myelopathy [M47.12]     Procedures  C4-5 Anterior cervical disectomy fusion  45217 - NV ARTHRD ANT INTERBODY DECOMPRESS CERVICAL BELW C2  Exploration of prior fusion; microscopy    NV ANTERIOR INSTRUMENTATION 2-3 VERTEBRAL SEGMENTS []  NV ALLOGRAFT FOR SPINE SURGERY ONLY MORSELIZED []  NV INSJ BIOMCHN DEV INTERVERTEBRAL DSC SPC W/ARTHRD []  Surgeons      * Nick Crump - Primary    Resident/Fellow/Other Assistant:  Surgeons and Role:     * Holland Platt MD - Resident - Assisting     * Keya Cai MD - Resident - Assisting    Procedure Summary  Anesthesia: General  ASA: III  Anesthesia Staff: Anesthesiologist: Ahmet Harrison MD; Shyann Connelly MD  CRNA: RENETTA Scales-CRNA  Anesthesia Resident: Zulma Curry DO  Estimated Blood Loss: 20mL  Intra-op Medications:   Administrations occurring from 1540 to 1935 on 24:   Medication Name Total Dose   acetaminophen (Tylenol) tablet 975 mg 975 mg              Anesthesia Record               Intraprocedure I/O Totals          Intake    LR bolus 800.00 mL    Total Intake 800 mL          Specimen: No specimens collected     Staff:   Circulator: Sade  Scrub Person: Nani  Relief Circulator: Olivier  Relief Scrub: Dorian  Relief Circulator: Wayne  Relief Scrub: Nirmala         Drains and/or Catheters: * None in log *        Implants:  Implants       Type Name Action Serial No.      Implant PUTTY ATTRAX, 1CC  - SNA - [id number] Implanted NA     Screw INTERLOCK II Ti-C Implanted NA               Findings: good placement of hardware    Indications: Sweta Lim is an 68 y.o. female who is having surgery for Degenerative cervical spinal stenosis [M48.02]  Spondylosis, cervical, with myelopathy [M47.12].     The patient was seen in the preoperative area. The risks, benefits, complications, treatment options, non-operative alternatives, expected recovery and outcomes were discussed with the patient. The possibilities of reaction to medication, pulmonary aspiration, injury to surrounding structures, bleeding, recurrent infection, the need for additional procedures, failure to diagnose a condition, and creating a complication requiring transfusion or operation were discussed with the patient. The patient concurred with the proposed plan, giving informed consent.  The site of surgery was properly noted/marked if necessary per policy. The patient has been actively warmed in preoperative area. Preoperative antibiotics have been ordered and given within 1 hours of incision. Venous thrombosis prophylaxis have been ordered including bilateral sequential compression devices    Procedure Details:     The patient was brought back from preop to OR. A safety huddle was performed and anesthesia was induced. Patient was placed in supine position on regular neuro bed with all pressure points carefully padded. The shoulders were taped caudally with 3-inch silk tape and the neck was placed on a rolled sheet to extend the neck. Proper levels were confirmed with X-ray. The incision was marked, and the neck was cleansed, prepped and draped in usual sterile fashion. Local anesthetic was used to infiltrate skin over incision.    Dissection was carried out with blunt and sharp dissection above the patient's prior incisikon. The platysma was overmined, divided, and dissection was further carried out between sternoclidomastoid/carotid artery and esophagus/trachea in the avascular plane until the prevertebral fascia and longus  colli were identified. This fascia was dissected with peanuts to expose the top half of the prior anterior plate at the C5-6 level. A radiolucent self-retaining retractor was placed into the field at the C4-5 intervertebral disc level.      An annulotomy was made with bovey, and C4-5 discectomy was performed with a combination of kerrasins, pituitary grasper, and currettes. The discectomy was performed and all disc fragments were removed. The posterior longitudinal ligament was opened with blunt nerve hook and angled curette, and further discectomy was performed. This was continued until blunt nerve hook could easily be passed into neural foramina on either side and under either vertebral bodies rostrally and caudally.    For the C4- C5 level, a 8 mm-trial spacer graft was placed into interspace, and X-ray was taken. After obtaining good hemostasis, an [id number] 15 degree standalone Interlock II Ti-C (NUVASIVE) with 1cc Attrax putty (NUVASVIE) was placed into interspace with confirmation of good positioning on fluoroscopy. Then a total of 3 13mm x 4.0mm screws were placed into the vertebral bodies of C4 and C5.    The wound was then copiously irrigated with Irricept followed antibiotic-impregnated saline. A round drain was placed into the wound and tunneled out laterally to the incision. The platsymal layer was closed with 3-0 vicryl sutures, and the dermal layer was also closed and approximated using 3-0 vicryl sutures. The skin was approximated with Exophin skin glue. All counts were correct at the end of closure.    The patient was then turned over to anesthesia for extubation.          Complications:  None; patient tolerated the procedure well.    Disposition: PACU - hemodynamically stable.  Condition: stable         Additional Details: none    Attending Attestation: I was present and scrubbed for the key portions of the procedure.    Nick Crump  Phone Number: [phone]

## 2024-07-17 NOTE — ANESTHESIA PROCEDURE NOTES
Arterial Line:    Date/Time: 7/17/2024 5:45 PM    Staffing  Performed: resident   Authorized by: Ahmet Harrison MD    Performed by: Zulma Curry DO    An arterial line was placed. in the OR for the following indication(s): continuous blood pressure monitoring and blood sampling needed.    A 20 gauge (size), 1 and 3/4 inch (length), Angiocath (type) catheter was placed into the Right radial artery, secured by Tegaderm,   Seldinger technique used.  Events:  hematoma during insertion, patient tolerated procedure well with no complications and 2 attempts.      Additional notes:  First attempt with flash and good arterial flow, unable to thread wire smoothly.  Catheter and wire removed, small hematoma developed and pressure applied  Second attempt same side more proximal, good arterial slow and easy to thread wire and catheter

## 2024-07-17 NOTE — ANESTHESIA PROCEDURE NOTES
Peripheral IV  Date/Time: 7/17/2024 5:28 PM      Placement  Needle size: 16 G  Laterality: left  Location: hand  Site prep: chlorhexidine  Technique: anatomical landmarks  Attempts: 2

## 2024-07-17 NOTE — ANESTHESIA POSTPROCEDURE EVALUATION
Patient: Sweta Lim    Procedure Summary       Date: 07/17/24 Room / Location: [facility] OR 10 / [facility] OR    Anesthesia Start: 1717 Anesthesia Stop: 1955    Procedure: C4-5 Anterior cervical disectomy fusion (Spine Cervical) Diagnosis:       Degenerative cervical spinal stenosis      Spondylosis, cervical, with myelopathy      (Degenerative cervical spinal stenosis [M48.02])      (Spondylosis, cervical, with myelopathy [M47.12])    Surgeons: Nick Crump MD PhD Responsible Provider: Shyann Connelly MD    Anesthesia Type: general ASA Status: 3            Anesthesia Type: general    Vitals Value Taken Time   /72 07/17/24 1949   Temp 36.3 07/17/24 1955   Pulse 88 07/17/24 1950   Resp 12 07/17/24 1950   SpO2 95 % 07/17/24 1950   Vitals shown include unfiled device data.    Anesthesia Post Evaluation    Patient location during evaluation: PACU  Patient participation: complete - patient participated  Level of consciousness: awake and alert  Pain score: 10  Pain management: inadequate (given additional 1 mg dilaudid on arrival to pacu)  Multimodal analgesia pain management approach  Airway patency: patent  Two or more strategies used to mitigate risk of obstructive sleep apnea  Cardiovascular status: acceptable and blood pressure returned to baseline  Respiratory status: acceptable  Hydration status: acceptable  Postoperative Nausea and Vomiting: none        There were no known notable events for this encounter.

## 2024-07-17 NOTE — ANESTHESIA PREPROCEDURE EVALUATION
Patient: Sweta Lim    Procedure Information       Date/Time: 07/17/24 1540    Procedure: C4-5 Anterior cervical disectomy fusion - ALL CPT CODES FOR THIS PROCEDURE  88018, 02494, 37794, 22718    Location: [facility] OR  / [facility] OR    Surgeons: Nick Crump MD PhD            Relevant Problems   Anesthesia (within normal limits)      Cardiac   (+) Aortic valve disease   (+) Benign essential hypertension   (+) Heart murmur   (+) Mixed hyperlipidemia      Pulmonary   (+) Asthma (HHS-HCC)   (+) Obstructive sleep apnea syndrome      Neuro   (+) Atherosclerosis of both carotid arteries   (+) Generalized anxiety disorder   (+) Moderate episode of recurrent major depressive disorder (Multi)      GI   (+) Chronic diarrhea   (+) Dysphagia      Endocrine   (+) DM (diabetes mellitus), type 2 (Multi)      Musculoskeletal   (+) Cervical spondylosis with myelopathy   (+) Degenerative cervical spinal stenosis   (+) Rheumatoid arthritis (Multi)   (+) Spondylosis of thoracic spine   (+) Spondylosis, cervical, with myelopathy      Nervous   (+) Neck pain      Tobacco   (+) Cigarette smoker      Musculoskeletal and Injuries   (+) Postlaminectomy syndrome of cervical region       Clinical information reviewed:   Tobacco  Allergies    Med Hx  Surg Hx  OB Status  Fam Hx  Soc Hx        NPO Detail:  NPO/Void Status  Carbohydrate Drink Given Prior to Surgery? : N  Date of Last Liquid: 07/16/24  Time of Last Liquid: 2200  Date of Last Solid: 07/16/24  Time of Last Solid: 2200  Last Intake Type: Solid meal  Time of Last Void: 1400      Vitals:    07/17/24 1545   BP: 161/71   Pulse: 60   Resp: 16   Temp: 36 °C (96.8 °F)   SpO2: 96%       Chemistry    Lab Results   Component Value Date/Time     07/01/2024 1450    K 4.5 07/01/2024 1450     07/01/2024 1450    CO2 29 07/01/2024 1450    BUN 38 (H) 07/01/2024 1450    CREATININE 0.45 (L) 07/01/2024 1450    Lab Results   Component Value Date/Time    CALCIUM  9.6 07/01/2024 1450    ALKPHOS 83 07/01/2024 1450    AST 15 07/01/2024 1450    ALT 18 07/01/2024 1450    BILITOT 0.4 07/01/2024 1450          Lab Results   Component Value Date/Time    WBC 11.5 (H) 07/01/2024 1434    HGB 14.9 07/01/2024 1434    HCT 45.8 07/01/2024 1434    PLT  07/01/2024 1434      Comment:      PLATELET CLUMPS PRECLUDE QUANTITATION. PLATELET ESTIMATE APPEARS ADEQUATE     Lab Results   Component Value Date/Time    PROTIME 12.0 07/01/2024 1450    INR 1.1 07/01/2024 1450     No results found for this or any previous visit (from the past 4464 hour(s)).    Transthoracic Echo (TTE) Complete    Result Date: 5/21/2024    [facility], [address] [place] [id number] [phone] and                                 Fax [phone] TRANSTHORACIC ECHOCARDIOGRAM REPORT  Patient Name:      CANDE De La Cruz Physician:    [id number] Minesh Camarillo MD Study Date:        5/21/2024            Ordering Provider:    [id number] ANJALI TRUJILLO MRN/PID:           [id number]             Fellow: Accession#:        [id number]         Nurse: Date of Birth/Age: 1955 / 68 years  Sonographer:          Danica Rouse                                                               RDCS Gender:            F                    Additional Staff: Height:            170.18 cm            Admit Date: Weight:            77.11 kg             Admission Status: BSA / BMI:         1.89 m2 / 26.63      Encounter#:           [id number]                    kg/m2                                         Department Location:  [facility] Blood Pressure: 120 /70 mmHg Study Type:    TRANSTHORACIC ECHO (TTE) COMPLETE Diagnosis/ICD: Shortness of breath-R06.02; Nonrheumatic aortic valve disorder,                unspecified-I35.9 Indication:    Short of breath CPT Code:      Echo Complete w Full  Doppler-[id number]  Study Detail: The following Echo studies were performed: 2D, M-Mode, Doppler and               color flow. Agitated saline used as a contrast agent for               intraseptal flow evaluation.  PHYSICIAN INTERPRETATION: Left Ventricle: The left ventricular systolic function is normal, with an estimated ejection fraction of 55-60%. There are no regional wall motion abnormalities. The left ventricular cavity size is normal. Spectral Doppler shows an impaired relaxation pattern of left ventricular diastolic filling. Left Atrium: The left atrium is normal in size. Right Ventricle: The right ventricle is normal in size. There is normal right ventricular global systolic function. Right Atrium: The right atrium is normal in size. Aortic Valve: The aortic valve is trileaflet. There is mild aortic valve thickening. There is evidence of mild aortic valve stenosis. There is no evidence of aortic valve regurgitation. The peak instantaneous gradient of the aortic valve is 24.6 mmHg. The mean gradient of the aortic valve is 13.1 mmHg. Mitral Valve: The mitral valve is normal in structure. There is no evidence of mitral valve regurgitation. Tricuspid Valve: The tricuspid valve is structurally normal. There is trace tricuspid regurgitation. The Doppler estimated RVSP is slightly elevated at 26.6 mmHg. Pulmonic Valve: The pulmonic valve is structurally normal. There is trace pulmonic valve regurgitation. Pericardium: There is no pericardial effusion noted. Aorta: The aortic root is normal.  CONCLUSIONS:  1. Left ventricular systolic function is normal with a 55-60% estimated ejection fraction.  2. Spectral Doppler shows an impaired relaxation pattern of left ventricular diastolic filling.  3. Slightly elevated RVSP.  4. Mild aortic valve stenosis. QUANTITATIVE DATA SUMMARY: 2D MEASUREMENTS:                           Normal Ranges: LAs:           4.07 cm    (2.7-4.0cm) RVIDd:         2.08 cm    (0.9-3.6cm) IVSd:           1.15 cm    (0.6-1.1cm) LVPWd:         1.22 cm    (0.6-1.1cm) LVIDd:         4.91 cm    (3.9-5.9cm) LVIDs:         3.65 cm LV Mass Index: 118.7 g/m2 LV % FS        25.8 % LA VOLUME:                       Normal Ranges: LA Area A4C: 17.4 cm2 LA Area A2C: 17.7 cm2 LA Vol A4C:  54.7 ml LA Vol A2C:  46.3 ml RA VOLUME BY A/L METHOD:                       Normal Ranges: RA Area A4C: 15.5 cm2 AORTA MEASUREMENTS:                    Normal Ranges: Ao STJ, d: 2.80 cm (1.7-3.4cm) Asc Ao, d: 3.20 cm (2.1-3.4cm) LV SYSTOLIC FUNCTION BY 2D PLANIMETRY (MOD):                     Normal Ranges: EF-A4C View: 66.3 % (>=55%) EF-A2C View: 57.8 % EF-Biplane:  64.3 % LV DIASTOLIC FUNCTION:                        Normal Ranges: MV lateral e' 0.06 m/s MV medial e'  0.05 m/s MITRAL VALVE:                      Normal Ranges: MV Vmax:    1.03 m/s (<=1.3m/s) MV peak P.3 mmHg (<5mmHg) MV mean P.4 mmHg (<2mmHg) MV VTI:     25.51 cm (10-13cm) AORTIC VALVE:                                    Normal Ranges: AoV Vmax:                2.48 m/s  (<=1.7m/s) AoV Peak P.6 mmHg (<20mmHg) AoV Mean P.1 mmHg (1.7-11.5mmHg) LVOT Max Tesfaye:            1.36 m/s  (<=1.1m/s) AoV VTI:                 50.60 cm  (18-25cm) LVOT VTI:                28.28 cm LVOT Diameter:           2.05 cm   (1.8-2.4cm) AoV Area, VTI:           1.85 cm2  (2.5-5.5cm2) AoV Area,Vmax:           1.82 cm2  (2.5-4.5cm2) AoV Dimensionless Index: 0.56 AORTIC INSUFFICIENCY: AI Vmax:       4.57 m/s AI Half-time:  506 msec AI Decel Time: 1746 msec AI Decel Rate: 261.49 cm/s2  RIGHT VENTRICLE: RV Basal 3.00 cm RV Mid   2.50 cm RV Major 5.8 cm TAPSE:   22.0 mm RV s'    0.00 m/s TRICUSPID VALVE/RVSP:                             Normal Ranges: Peak TR Velocity: 2.43 m/s RV Syst Pressure: 26.6 mmHg (< 30mmHg) IVC Diam:         1.60 cm AORTA: Asc Ao Diam 3.19 cm  [id number] Minesh Camarillo MD Electronically signed on 2024 at 2:45:49 PM  ** Final **            Physical Exam    Airway  Mallampati: III  Neck ROM: limited     Cardiovascular   Rhythm: regular  Rate: normal     Dental   Comments: No loose teeth, but overall poor dentition    Pulmonary   Breath sounds clear to auscultation     Abdominal          Anesthesia Plan    History of general anesthesia?: yes  History of complications of general anesthesia?: no    ASA 3     general     The patient is a current smoker.  Patient did not smoke on day of procedure.    intravenous induction   Postoperative administration of opioids is intended.  Trial extubation is planned.  Anesthetic plan and risks discussed with patient.  Use of blood products discussed with patient who consented to blood products.    Plan discussed with resident and attending.

## 2024-07-17 NOTE — HOSPITAL COURSE
Sweta Lim is a 68 year old female with history of HTN, HLD, COPD, DEBRA, asthma, hypothyroidism, DM2, GERD, depression, anxiety, previous C5-6 ACDF, neck pain and myelopathy, found to have L C4-5 paracentral disc who presented for scheduled surgery.    7/16 s/p C4-5 ACDF  7/18 uprights with hardware in appropriate position   7/19 patient with increased neck swelling concerning for hematoma overnight, on exam patient with hoarse voice and difficulty swallowing, however difficulty with airway, breathing comfortably on room air, lateral neck xray demonstrated sig incr neck swelling compared to prior c spine xrays  7/19 s/p right neck exploration and evacuation of hematoma   7/20 SLP evaluation, recommended NPO, Cortrak placed   7/22 repeat SLP evaluation/MBS failed  7/23 small area of serosanguinous incisional leakage noted, wound care consulted and daily dressing changes recommended     PT/OT eval recommended SNF placement    Discharged with detailed instructions and scheduled outpatient follow up

## 2024-07-17 NOTE — ANESTHESIA PROCEDURE NOTES
Airway  Date/Time: 7/17/2024 5:31 PM  Urgency: elective    Airway not difficult    Staffing  Performed: CRNA   Authorized by: Ahmet Harrison MD    Performed by: RENETTA Scales-SALOME  Patient location during procedure: OR    Indications and Patient Condition  Indications for airway management: anesthesia and airway protection  Spontaneous ventilation: present  Sedation level: minimal  Preoxygenated: yes  Patient position: sniffing  Mask difficulty assessment: 1 - vent by mask    Final Airway Details  Final airway type: endotracheal airway      Successful airway: ETT  Cuffed: yes   Successful intubation technique: video laryngoscopy  Facilitating devices/methods: intubating stylet  Endotracheal tube insertion site: oral  Blade size: #4  ETT size (mm): 7.0  Cormack-Lehane Classification: grade I - full view of glottis  Placement verified by: chest auscultation and capnometry   Measured from: teeth  Number of attempts at approach: 1

## 2024-07-18 ENCOUNTER — APPOINTMENT (OUTPATIENT)
Dept: RADIOLOGY | Facility: HOSPITAL | Age: 69
End: 2024-07-18
Payer: MEDICARE

## 2024-07-18 LAB
ABO GROUP (TYPE) IN BLOOD: NORMAL
ANION GAP SERPL CALC-SCNC: 13 MMOL/L (ref 10–20)
BUN SERPL-MCNC: 18 MG/DL (ref 6–23)
CALCIUM SERPL-MCNC: 8.9 MG/DL (ref 8.6–10.6)
CHLORIDE SERPL-SCNC: 101 MMOL/L (ref 98–107)
CO2 SERPL-SCNC: 28 MMOL/L (ref 21–32)
CREAT SERPL-MCNC: 0.46 MG/DL (ref 0.5–1.05)
EGFRCR SERPLBLD CKD-EPI 2021: >90 ML/MIN/1.73M*2
ERYTHROCYTE [DISTWIDTH] IN BLOOD BY AUTOMATED COUNT: 15.4 % (ref 11.5–14.5)
GLUCOSE BLD MANUAL STRIP-MCNC: 114 MG/DL (ref 74–99)
GLUCOSE BLD MANUAL STRIP-MCNC: 125 MG/DL (ref 74–99)
GLUCOSE BLD MANUAL STRIP-MCNC: 128 MG/DL (ref 74–99)
GLUCOSE BLD MANUAL STRIP-MCNC: 148 MG/DL (ref 74–99)
GLUCOSE BLD MANUAL STRIP-MCNC: 186 MG/DL (ref 74–99)
GLUCOSE SERPL-MCNC: 122 MG/DL (ref 74–99)
HCT VFR BLD AUTO: 45 % (ref 36–46)
HGB BLD-MCNC: 14.3 G/DL (ref 12–16)
MCH RBC QN AUTO: 33.6 PG (ref 26–34)
MCHC RBC AUTO-ENTMCNC: 31.8 G/DL (ref 32–36)
MCV RBC AUTO: 106 FL (ref 80–100)
NRBC BLD-RTO: 0 /100 WBCS (ref 0–0)
PLATELET # BLD AUTO: 270 X10*3/UL (ref 150–450)
POTASSIUM SERPL-SCNC: 4.8 MMOL/L (ref 3.5–5.3)
RBC # BLD AUTO: 4.25 X10*6/UL (ref 4–5.2)
RH FACTOR (ANTIGEN D): NORMAL
SODIUM SERPL-SCNC: 137 MMOL/L (ref 136–145)
WBC # BLD AUTO: 10.7 X10*3/UL (ref 4.4–11.3)

## 2024-07-18 PROCEDURE — 97161 PT EVAL LOW COMPLEX 20 MIN: CPT | Mod: GP | Performed by: PHYSICAL THERAPIST

## 2024-07-18 PROCEDURE — 2500000002 HC RX 250 W HCPCS SELF ADMINISTERED DRUGS (ALT 637 FOR MEDICARE OP, ALT 636 FOR OP/ED): Performed by: STUDENT IN AN ORGANIZED HEALTH CARE EDUCATION/TRAINING PROGRAM

## 2024-07-18 PROCEDURE — 85027 COMPLETE CBC AUTOMATED: CPT | Performed by: STUDENT IN AN ORGANIZED HEALTH CARE EDUCATION/TRAINING PROGRAM

## 2024-07-18 PROCEDURE — 7100000011 HC EXTENDED STAY RECOVERY HOURLY - NURSING UNIT

## 2024-07-18 PROCEDURE — 2500000004 HC RX 250 GENERAL PHARMACY W/ HCPCS (ALT 636 FOR OP/ED)

## 2024-07-18 PROCEDURE — 97530 THERAPEUTIC ACTIVITIES: CPT | Mod: GO

## 2024-07-18 PROCEDURE — 82947 ASSAY GLUCOSE BLOOD QUANT: CPT

## 2024-07-18 PROCEDURE — 36415 COLL VENOUS BLD VENIPUNCTURE: CPT | Performed by: STUDENT IN AN ORGANIZED HEALTH CARE EDUCATION/TRAINING PROGRAM

## 2024-07-18 PROCEDURE — 82374 ASSAY BLOOD CARBON DIOXIDE: CPT | Performed by: STUDENT IN AN ORGANIZED HEALTH CARE EDUCATION/TRAINING PROGRAM

## 2024-07-18 PROCEDURE — 96372 THER/PROPH/DIAG INJ SC/IM: CPT | Performed by: STUDENT IN AN ORGANIZED HEALTH CARE EDUCATION/TRAINING PROGRAM

## 2024-07-18 PROCEDURE — 72040 X-RAY EXAM NECK SPINE 2-3 VW: CPT

## 2024-07-18 PROCEDURE — 72040 X-RAY EXAM NECK SPINE 2-3 VW: CPT | Performed by: RADIOLOGY

## 2024-07-18 PROCEDURE — 2500000001 HC RX 250 WO HCPCS SELF ADMINISTERED DRUGS (ALT 637 FOR MEDICARE OP)

## 2024-07-18 PROCEDURE — 97530 THERAPEUTIC ACTIVITIES: CPT | Mod: GP | Performed by: PHYSICAL THERAPIST

## 2024-07-18 PROCEDURE — 97165 OT EVAL LOW COMPLEX 30 MIN: CPT | Mod: GO

## 2024-07-18 PROCEDURE — 2500000001 HC RX 250 WO HCPCS SELF ADMINISTERED DRUGS (ALT 637 FOR MEDICARE OP): Performed by: STUDENT IN AN ORGANIZED HEALTH CARE EDUCATION/TRAINING PROGRAM

## 2024-07-18 PROCEDURE — 97535 SELF CARE MNGMENT TRAINING: CPT | Mod: GO

## 2024-07-18 PROCEDURE — 2500000004 HC RX 250 GENERAL PHARMACY W/ HCPCS (ALT 636 FOR OP/ED): Performed by: STUDENT IN AN ORGANIZED HEALTH CARE EDUCATION/TRAINING PROGRAM

## 2024-07-18 PROCEDURE — 99291 CRITICAL CARE FIRST HOUR: CPT | Performed by: REGISTERED NURSE

## 2024-07-18 RX ORDER — BUSPIRONE HYDROCHLORIDE 10 MG/1
20 TABLET ORAL 2 TIMES DAILY
Status: DISCONTINUED | OUTPATIENT
Start: 2024-07-18 | End: 2024-07-27 | Stop reason: HOSPADM

## 2024-07-18 RX ORDER — BUSPIRONE HYDROCHLORIDE 10 MG/1
10 TABLET ORAL DAILY
Status: DISCONTINUED | OUTPATIENT
Start: 2024-07-19 | End: 2024-07-27 | Stop reason: HOSPADM

## 2024-07-18 ASSESSMENT — COGNITIVE AND FUNCTIONAL STATUS - GENERAL
WALKING IN HOSPITAL ROOM: A LITTLE
DRESSING REGULAR LOWER BODY CLOTHING: A LITTLE
DAILY ACTIVITIY SCORE: 19
PERSONAL GROOMING: A LITTLE
STANDING UP FROM CHAIR USING ARMS: A LITTLE
MOBILITY SCORE: 16
TURNING FROM BACK TO SIDE WHILE IN FLAT BAD: A LITTLE
MOVING TO AND FROM BED TO CHAIR: A LITTLE
TOILETING: A LITTLE
HELP NEEDED FOR BATHING: A LITTLE
DRESSING REGULAR UPPER BODY CLOTHING: A LITTLE
CLIMB 3 TO 5 STEPS WITH RAILING: TOTAL
MOVING FROM LYING ON BACK TO SITTING ON SIDE OF FLAT BED WITH BEDRAILS: A LITTLE

## 2024-07-18 ASSESSMENT — PAIN SCALES - GENERAL
PAINLEVEL_OUTOF10: 7
PAINLEVEL_OUTOF10: 6
PAINLEVEL_OUTOF10: 8
PAINLEVEL_OUTOF10: 2
PAINLEVEL_OUTOF10: 8
PAINLEVEL_OUTOF10: 4

## 2024-07-18 ASSESSMENT — PAIN - FUNCTIONAL ASSESSMENT
PAIN_FUNCTIONAL_ASSESSMENT: 0-10

## 2024-07-18 ASSESSMENT — ACTIVITIES OF DAILY LIVING (ADL)
ADL_ASSISTANCE: INDEPENDENT
ADL_ASSISTANCE: INDEPENDENT
HOME_MANAGEMENT_TIME_ENTRY: 10
BATHING_ASSISTANCE: STAND BY

## 2024-07-18 NOTE — PROGRESS NOTES
Occupational Therapy    Evaluation/Treatment    Patient Name: Sweta Lim  MRN: [id number]  : 1955  Today's Date: 24  Time Calculation  Start Time: 846  Stop Time: 931  Time Calculation (min): 45 min       Assessment:  OT Assessment: Pt will benefit from continued skilled OT to increase independence in ADLs, functional mobility, activity tolerance, safety, and cognition.  Prognosis: Good  Barriers to Discharge: Decreased caregiver support  Evaluation/Treatment Tolerance: Patient tolerated treatment well  Medical Staff Made Aware: Yes  End of Session Communication: Bedside nurse  End of Session Patient Position: Bed, 4 rail up, Alarm on (per pt request)  OT Assessment Results: Decreased ADL status, Decreased upper extremity strength, Decreased safe judgment during ADL, Decreased endurance, Decreased cognition, Decreased functional mobility, Decreased IADLs  Prognosis: Good  Barriers to Discharge: Decreased caregiver support  Evaluation/Treatment Tolerance: Patient tolerated treatment well  Medical Staff Made Aware: Yes  Strengths: Attitude of self  Barriers to Participation: Comorbidities  Plan:  Treatment Interventions: ADL retraining, Functional transfer training, UE strengthening/ROM, Endurance training, Cognitive reorientation, Patient/family training, Equipment evaluation/education, Compensatory technique education  OT Frequency: 3 times per week  OT Discharge Recommendations: Moderate intensity level of continued care  OT Recommended Transfer Status: Assist of 1  OT - OK to Discharge: Yes (upon medical clearance)  Treatment Interventions: ADL retraining, Functional transfer training, UE strengthening/ROM, Endurance training, Cognitive reorientation, Patient/family training, Equipment evaluation/education, Compensatory technique education    Subjective   Current Problem:  1. Degenerative cervical spinal stenosis  Full code    Place in outpatient/hospital ambulatory surgery    Full code    Place  "in outpatient/hospital ambulatory surgery    CANCELED: Vital Signs    CANCELED: Pulse oximetry, spot    CANCELED: Vital Signs    CANCELED: Pulse oximetry, spot      2. Spondylosis, cervical, with myelopathy  Full code    Place in outpatient/hospital ambulatory surgery    Full code    Place in outpatient/hospital ambulatory surgery    CANCELED: Vital Signs    CANCELED: Pulse oximetry, spot    CANCELED: Vital Signs    CANCELED: Pulse oximetry, spot      3. Tobacco use disorder  nicotine (Nicoderm CQ) 21 mg/24 hr patch 1 patch      4. Cervical myelopathy (Multi)  Referral to Neurosurgery        General:   OT Received On: 07/18/24  General  Reason for Referral: p/w neck pain and myelopathy, found to have L C4-5 paracentral disc, s/p C4-5 ACDF  Past Medical History Relevant to Rehab: HTN, HLD, COPD, DEBRA, asthma, hypothyroidism, DM2, GERD, depression, anxiety, previous C5-6 ACDF  Family/Caregiver Present: No  Prior to Session Communication: Bedside nurse  Patient Position Received: Bed, 3 rail up, Alarm on  Preferred Learning Style: verbal, visual  General Comment: Pt supine in bed upon arrival agreeable to OT  Precautions:  Medical Precautions: Fall precautions, Spinal precautions  Post-Surgical Precautions: Spinal precautions    Pain:  Pain Assessment  Pain Assessment: 0-10  0-10 (Numeric) Pain Score: 4  Pain Type: Acute pain, Surgical pain  Pain Location: Neck    Objective   Cognition:  Overall Cognitive Status: Impaired  Orientation Level: Oriented X4  Following Commands: Follows one step commands with increased time  Safety Judgment: Decreased awareness of need for assistance  Cognition Comments: Pt asking \"It's 9:30 PM?\", reoriented to time of day, pt confused throughout session, impulsive requiring mod VCs for safety and sequencing throughout session. The MoCA assesses the following cognitive domains: attention and concentration, executive functions, memory, language, visuoconstructional skills, conceptual " thinking, calculations, and orientation.  Pt had deficits in: attention and concentration, executive functions, memory, visuoconstruction skills, conceptual thinking, calculations. Pt scored 16/30 a score > or = 26/30 is considered “normal cognition”  Insight: Moderate  Impulsive: Mildly     Home Living:  Type of Home: Apartment  Lives With: Alone  Home Adaptive Equipment: Walker rolling or standard (shower bench)  Home Layout: One level  Home Access: Stairs to enter with rails  Entrance Stairs-Number of Steps: 4  Bathroom Shower/Tub: Tub/shower unit  Bathroom Equipment: Grab bars in shower, Shower chair without back  Home Living Comments: pt is ? historian  Prior Function:  Level of [place]: Independent with ADLs and functional transfers, Needs assistance with homemaking  Receives Help From: Family  ADL Assistance: Independent  Homemaking Assistance: Needs assistance (reports family A with cooking and cleaning)  Ambulatory Assistance: Independent (FWW community mobility)  Leisure: sleeping  Hand Dominance: Right  Prior Function Comments: 6 recent falls  IADL History:  Homemaking Responsibilities: No  Current License: Yes  Mode of Transportation: Car  Leisure and Hobbies: sleeping  ADL:  Eating Assistance: Independent (anticipated)  Grooming Assistance: Stand by (anticipated)  Bathing Assistance: Stand by (anticipated seated)  UE Dressing Assistance: Stand by (anticipated)  LE Dressing Assistance: Stand by (anticipated, brought B feet to self seated EOB)  Toileting Assistance with Device: Stand by  Activities of Daily Living:      Grooming  Grooming Level of Assistance: Contact guard  Grooming Where Assessed: Standing sinkside  Grooming Comments: Pt performed handwashing standing at sink CGA FWW, VCs for positioning and sequencing    Toileting  Toileting Level of Assistance: Contact guard  Where Assessed: Toilet  Toileting Comments: Pt performed toileting seated, STS CGA FWW, max VCs for positioning and safety,  I rochelle care seated  Activity Tolerance:  Endurance: Decreased tolerance for upright activites    Bed Mobility/Transfers: Bed Mobility  Bed Mobility: Yes  Bed Mobility 1  Bed Mobility 1: Supine to sitting, Sitting to supine  Level of Assistance 1: Contact guard  Bed Mobility Comments 1: HOB elevated, VCs for log roll    Transfers  Transfer: Yes  Transfer 1  Transfer From 1: Sit to, Stand to  Transfer to 1: Stand, Sit  Technique 1: Sit to stand, Stand to sit  Transfer Device 1: Walker  Transfer Level of Assistance 1: Contact guard  Trials/Comments 1: VCs for hand placement  Transfers 2  Transfer From 2: Toilet to, Stand to  Transfer to 2: Stand, Toilet  Technique 2: Sit to stand, Stand to sit  Transfer Device 2: Walker  Transfer Level of Assistance 2: Contact guard  Trials/Comments 2: VCs for postitioning and safety    Functional Mobility:  Functional Mobility  Functional Mobility Performed: Yes  Functional Mobility 1  Surface 1: Level tile  Device 1: Rolling walker  Assistance 1: Contact guard, Minimum assistance  Comments 1: fxnl mob min household distance bed <> bathroom CGA FWW, VCs for walker positioning and safety throughout, pt leaving walker throughout session, 2x LOB min A to recover  Sitting Balance:  Static Sitting Balance  Static Sitting-Balance Support: Feet supported  Static Sitting-Level of Assistance: Close supervision  Dynamic Sitting Balance  Dynamic Sitting-Balance Support: Feet supported  Dynamic Sitting-Comments: SBA  Standing Balance:  Static Standing Balance  Static Standing-Balance Support: Bilateral upper extremity supported  Static Standing-Level of Assistance: Contact guard  Dynamic Standing Balance  Dynamic Standing-Balance Support: Bilateral upper extremity supported  Dynamic Standing-Comments: CGA-min A    Modalities:  Modalities Used: No    Therapy/Activity:      Therapeutic Activity  Therapeutic Activity Performed: Yes  Therapeutic Activity 1: educated on spinal prec and ADLs, log roll,  safety, max VCs to maintain throughout session  Therapeutic Activity 2: bed mob, STS 2x, fxnl mob    Vision:Vision - Basic Assessment  Current Vision: Wears glasses only for reading  Sensation:  Light Touch: No apparent deficits  Sharp/Dull: No apparent deficits  Stereognosis: No apparent deficits  Proprioception: No apparent deficits  Sensation Comment: denies N/T  Strength:  Strength Comments: BUE grossly 3/5 observed through funtional observation    Perception:  Inattention/Neglect: Appears intact  Coordination:  Movements are Fluid and Coordinated: Yes   Hand Function:  Hand Function  Gross Grasp: Functional  Coordination: Functional    Outcome Measures: [facility] Daily Activity  Putting on and taking off regular lower body clothing: A little  Bathing (including washing, rinsing, drying): A little  Putting on and taking off regular upper body clothing: A little  Toileting, which includes using toilet, bedpan or urinal: A little  Taking care of personal grooming such as brushing teeth: A little  Eating Meals: None  Daily Activity - Total Score: 19     and OT Adult Other Outcome Measures  MOCA Total Score: 16    Education Documentation  Handouts, taught by Dante Turner OT at 7/18/2024 11:38 AM.  Learner: Patient  Readiness: Acceptance  Method: Explanation  Response: Verbalizes Understanding, Needs Reinforcement    Body Mechanics, taught by Dante Turner OT at 7/18/2024 11:38 AM.  Learner: Patient  Readiness: Acceptance  Method: Explanation  Response: Verbalizes Understanding, Needs Reinforcement    Precautions, taught by Dante Turner OT at 7/18/2024 11:38 AM.  Learner: Patient  Readiness: Acceptance  Method: Explanation  Response: Verbalizes Understanding, Needs Reinforcement    ADL Training, taught by Dante Turner OT at 7/18/2024 11:38 AM.  Learner: Patient  Readiness: Acceptance  Method: Explanation  Response: Verbalizes Understanding, Needs Reinforcement    Education Comments  No comments  found.      Goals:  Encounter Problems       Encounter Problems (Active)       ADLs       Patient with complete lower body dressing with modified independent level of assistance donning and doffing all LE clothes  with PRN adaptive equipment while supported sitting and standing (Progressing)       Start:  07/18/24    Expected End:  08/08/24            Patient will complete toileting including hygiene clothing management/hygiene with modified independent level of assistance and grab bars. (Progressing)       Start:  07/18/24    Expected End:  08/08/24               BALANCE       Pt will maintain dynamic standing balance during ADL task with modified independent level of assistance in order to demonstrate decreased risk of falling and improved postural control. (Progressing)       Start:  07/18/24    Expected End:  08/08/24               COGNITION/SAFETY       Patient will recall and adhere to spine precautions during all functional mobility/ADL tasks in order to demonstrate improved understanding and promote healing post op (Progressing)       Start:  07/18/24    Expected End:  08/08/24            Patient will score WFL on standardized cognitive assessment with verbal cues and within reasonable time frame (Progressing)       Start:  07/18/24    Expected End:  08/08/24               MOBILITY       Patient will perform Functional mobility max Household distances/Community Distances with modified independent level of assistance and least restrictive device in order to improve safety and functional mobility. (Progressing)       Start:  07/18/24    Expected End:  08/08/24               TRANSFERS       Patient will complete sit to stand transfer with modified independent level of assistance and least restrictive device in order to improve safety and prepare for out of bed mobility. (Progressing)       Start:  07/18/24    Expected End:  08/08/24               KALLIE Resendez/AMY

## 2024-07-18 NOTE — CARE PLAN
The patient's goals for the shift include    Problem: Fall/Injury  Goal: Not fall by end of shift  Outcome: Progressing  Goal: Be free from injury by end of the shift  Outcome: Progressing  Goal: Verbalize understanding of personal risk factors for fall in the hospital  Outcome: Progressing  Goal: Verbalize understanding of risk factor reduction measures to prevent injury from fall in the home  Outcome: Progressing  Goal: Use assistive devices by end of the shift  Outcome: Progressing  Goal: Pace activities to prevent fatigue by end of the shift  Outcome: Progressing       The clinical goals for the shift include Patient will remain free from falls and injuries and have adequate pain control

## 2024-07-18 NOTE — PROGRESS NOTES
Physical Therapy    Physical Therapy Evaluation & Treatment    Patient Name: Sweta Lim  MRN: [id number]  Today's Date: 7/18/2024   Time Calculation  Start Time: 1212  Stop Time: 1240  Time Calculation (min): 28 min    Assessment/Plan   PT Assessment  PT Assessment Results: Decreased strength, Decreased endurance, Impaired balance, Decreased mobility, Decreased cognition, Decreased safety awareness, Impaired judgement  Rehab Prognosis: Good  Barriers to Participation: Comorbidities  End of Session Communication: Bedside nurse  End of Session Patient Position: Bed, 3 rail up, Alarm on   IP OR SWING BED PT PLAN  Inpatient or Swing Bed: Inpatient  PT Plan  Treatment/Interventions: Bed mobility, Transfer training, Gait training, Stair training, Strengthening, Endurance training, Therapeutic exercise, Therapeutic activity  PT Plan: Ongoing PT  PT Frequency: Daily  PT Discharge Recommendations: Moderate intensity level of continued care  PT Recommended Transfer Status: Assist x1, Assistive device  PT - OK to Discharge: Yes      Subjective     General Visit Information:  General  Reason for Referral: Degenerative cervical spinal stenosis, Spondylosis, cervical, with myelopathy s/p C4-5 Anterior cervical disectomy fusion  Missed Visit: Yes  Missed Visit Reason:  (Pt off the floor)  Prior to Session Communication: Bedside nurse  Patient Position Received: Bed, 3 rail up, Alarm on  Preferred Learning Style: verbal, visual  General Comment: Pt supine in bed upon arrival and agreeable to partiicpate in PT session. Demo'd decreased safety awareness throught and required cues for safety, spinal precuations and log roll. Confused, questionable historian.  Home Living:  Home Living  Type of Home: Apartment  Lives With: Alone (reports aunt can assist upon dc)  Home Adaptive Equipment: Walker rolling or standard  Home Layout: One level  Alternate Level Stairs-Number of Steps: 7 ( reports she has to go down 7 steps to get to  apartment 1st floor) (1 handrail)  Home Access: Stairs to enter without rails (steps wide enough to use walker)  Entrance Stairs-Number of Steps: 4  Bathroom Shower/Tub: Tub/shower unit  Prior Level of Function:  Prior Function Per Pt/Caregiver Report  Level of [place]: Independent with ADLs and functional transfers, Needs assistance with homemaking  ADL Assistance: Independent  Ambulatory Assistance:  ([place] with FWW)  Hand Dominance: Right  Prior Function Comments: reports 8 falls in the past 6 months, +drives, pet dog  Precautions:  Precautions  Hearing/Visual Limitations: [place], B Hearing Aids  Medical Precautions: Fall precautions, Spinal precautions  Post-Surgical Precautions: Spinal precautions  Precautions Comment: R AFO-pt reports she needs to  the AFO  Vital Signs:  Vital Signs  Heart Rate: 63  SpO2: 94 %    Objective   Pain:  Pain Assessment  Pain Assessment: 0-10  0-10 (Numeric) Pain Score: 8  Pain Type: Acute pain, Surgical pain  Pain Location: Neck  Cognition:  Cognition  Overall Cognitive Status: Impaired  Orientation Level: Oriented X4  Following Commands: Follows one step commands with repetition  Impulsive: Moderately    General Assessments:      Sensation  Light Touch: No apparent deficits    Strength  Strength Comments: BLE's 3+/5 based on mobility, except R ankle DF 3-/5         Static Sitting Balance  Static Sitting-Level of Assistance:  (SBA)  Dynamic Sitting Balance  Dynamic Sitting-Balance:  (SBA)    Static Standing Balance  Static Standing-Level of Assistance:  (CGA with walker)  Dynamic Standing Balance  Dynamic Standing-Balance:  (CGA-[place] with walker)  Functional Assessments:  Bed Mobility  Bed Mobility: Yes  Bed Mobility 1  Bed Mobility 1: Supine to sitting, Sitting to supine  Level of Assistance 1: Contact guard, Minimal verbal cues  Bed Mobility Comments 1: HOB elevated,cues for log roll    Transfers  Transfer: Yes  Transfer 1  Transfer From 1: Sit to, Stand to  Transfer to  1: Stand, Sit  Technique 1: Sit to stand, Stand to sit  Transfer Device 1: Walker  Transfer Level of Assistance 1: Minimal verbal cues (CGA-Suma)  Trials/Comments 1: cues for safe hand placement and sequencing  Transfers 2  Transfer From 2: Sit to  Transfer to 2: Toilet  Technique 2: Stand pivot (x2)  Transfer Device 2: Walker  Transfer Level of Assistance 2: Contact guard    Ambulation/Gait Training  Ambulation/Gait Training Performed: Yes  Ambulation/Gait Training 1  Surface 1: Level tile  Device 1: Rolling walker  Assistance 1: Contact guard, Moderate verbal cues, Minimal tactile cues  Comments/Distance (ft) 1: 4x15 ft (to/from bathroom) (cues for safe walker placement with ambulation, safety and not to abandon walker before reaching the seated surafce. One mild LOB with ambulation and required [place] for balance)    Stairs  Stairs: No                Treatments:  Therapeutic Exercise  Therapeutic Exercise Performed: Yes  Therapeutic Exercise Activity 1: Seated BLE: AP, LAQ 1x10 reps. Cues for techniques.    Bed Mobility  Bed Mobility: Yes  Bed Mobility 1  Bed Mobility 1: Supine to sitting, Sitting to supine  Level of Assistance 1: Contact guard, Minimal verbal cues  Bed Mobility Comments 1: HOB elevated, cues for log roll    Ambulation/Gait Training  Ambulation/Gait Training Performed: Yes  Ambulation/Gait Training 1  Surface 1: Level tile  Device 1: Rolling walker  Assistance 1: Contact guard, Moderate verbal cues, Minimal tactile cues  Comments/Distance (ft) 1: 4x15 ft (to/from bathroom) (cues for safe walker placement with ambulation, safety and not to abandon walker before reaching the seated surafce. One mild LOB with ambulation and required [place] for balance)  Transfers  Transfer: Yes  Transfer 1  Transfer From 1: Sit to, Stand to  Transfer to 1: Stand, Sit  Technique 1: Sit to stand, Stand to sit  Transfer Device 1: Walker  Transfer Level of Assistance 1: Minimal verbal cues (CGA-S[place])  Trials/Comments 1:  cues for safe hand placement and sequencing  Transfers 2  Transfer From 2: Sit to  Transfer to 2: Toilet  Technique 2: Stand pivot (x2)  Transfer Device 2: Walker  Transfer Level of Assistance 2: Contact guard      Outcome Measures:  [facility] Basic Mobility  Turning from your back to your side while in a flat bed without using bedrails: A little  Moving from lying on your back to sitting on the side of a flat bed without using bedrails: A little  Moving to and from bed to chair (including a wheelchair): A little  Standing up from a chair using your arms (e.g. wheelchair or bedside chair): A little  To walk in hospital room: A little  Climbing 3-5 steps with railing: Total  Basic Mobility - Total Score: 16    Encounter Problems       Encounter Problems (Active)       Balance       Pt will score >/=24/28 on [place] to demonstrate decreased falls risk (Progressing)       Start:  07/18/24    Expected End:  08/01/24               Mobility       Pt will be SBA to ascend/descend 4 steps with LRAD and 7 steps with 1 handrail (Progressing)       Start:  07/18/24    Expected End:  08/01/24            Pt will be SBA ambulation 100 ft with RW (Progressing)       Start:  07/18/24    Expected End:  08/01/24               PT Transfers       Pt will be SBA for sit to stand and bed to chair transfers with RW (Progressing)       Start:  07/18/24    Expected End:  08/01/24            Pt will be [place] with bed mobility (Progressing)       Start:  07/18/24    Expected End:  08/01/24                   Education Documentation  Precautions, taught by Sophy Key PT at 7/18/2024  3:14 PM.  Learner: Patient  Readiness: Acceptance  Method: Explanation, Demonstration  Response: Verbalizes Understanding, Needs Reinforcement    Body Mechanics, taught by Sophy Key PT at 7/18/2024  3:14 PM.  Learner: Patient  Readiness: Acceptance  Method: Explanation, Demonstration  Response: Verbalizes Understanding, Needs Reinforcement    Mobility Training, taught  by Sophy Key PT at 7/18/2024  3:14 PM.  Learner: Patient  Readiness: Acceptance  Method: Explanation, Demonstration  Response: Verbalizes Understanding, Needs Reinforcement    Education Comments  No comments found.

## 2024-07-18 NOTE — PROGRESS NOTES
Physical Therapy                 Therapy Communication Note    Patient Name: Sweta Lim  MRN: [id number]  Today's Date: 7/18/2024     Discipline: Physical Therapy    Missed Visit Reason: Missed Visit Reason:  (Pt off the floor)    Missed Time: Attempt

## 2024-07-18 NOTE — PROGRESS NOTES
Pharmacy Medication History Review    Sweta Lim is a 68 y.o. female admitted for Degenerative cervical spinal stenosis. Pharmacy reviewed the patient's [id number] medications and allergies for accuracy.    The list below reflects the updated PTA list. Comments regarding how patient may be taking medications differently can be found in the Admit Orders Activity  Prior to Admission Medications   Prescriptions Last Dose Informant Taking?   Maryse Ellipta 100-62.5-25 mcg blister with device 2024 Self Yes   Sig: INHALE 1 DOSE ORALLY EVERY DAY (RINSE MOUTH AFTER USE)   alendronate (Fosamax) 70 mg tablet Past Week Self No   Sig: Take 1 tablet (70 mg) by mouth every 7 days. BEFORE FIRST FOOD, DRINK OR MEDICINE OF THE DAY. DISSOLVE IN 4OZ OF WATER  --> patient not taking   busPIRone (Buspar) 10 mg tablet Past Week Self Yes   Si tablets each morning and 1 tablet each afternoon and 2 tablets each evening   clonazePAM (KlonoPIN) 1 mg tablet 2024 at 0900 Self Yes   Si/2 tablet daily each morning and 1 tablet daily each night , this is a reduced dose   colchicine 0.6 mg tablet Past Week Self No   Sig: Take 1 tablet (0.6 mg) by mouth once daily.    --> patient not taking   escitalopram (Lexapro) 20 mg tablet 2024 at 0900 Self Yes   Sig: Take 1 tablet (20 mg) by mouth once daily.   ezetimibe (Zetia) 10 mg tablet Unknown Self No   Sig: Take 1 tablet (10 mg) by mouth once daily.    --> patient not taking   folic acid (Folvite) 1 mg tablet Past Week Self No   Sig: Take 1 tablet (1 mg) by mouth once daily.    --> patient not taking-- states that she ran out and never got a new prescription   insulin degludec (Tresiba FlexTouch) 100 unit/mL (3 mL) injection 2024 Self Yes   Sig: Inject 30 units SC once daily at bedtime. Adjust by 2 units every 4 days to achieve fasting glucose < 150, and glucose never lower than 100.   levothyroxine (Synthroid, Levoxyl) 75 mcg tablet Past Week Self Yes   Sig:  Take 1 tablet (75 mcg) by mouth once daily.    --> patient often forgets taking   methotrexate (Trexall) 2.5 mg tablet Not Taking Self Yes   Sig: Take 8 tablets (20 mg total) by mouth 1 (one) time per week.  wednesday   nicotine (Nicoderm CQ) 7 mg/24 hr patch   Yes   Sig: Place 1 patch over 24 hours on the skin once every 24 hours.   nicotine polacrilex (Commit) 4 mg lozenge   Yes   Sig: Dissolve 1 lozenge (4 mg) in the mouth every 2 hours if needed for smoking cessation.   nicotine polacrilex (Nicorette) 2 mg lozenge   Yes   Sig: Use 1 lozenge (2 mg) in the mouth or throat every 2 hours if needed for smoking cessation.   omeprazole (PriLOSEC) 40 mg DR capsule 7/17/2024 at 0900 Self Yes   Sig: TAKE 1 CAPSULE BY MOUTH TWICE A DAY   primidone (Mysoline) 50 mg tablet Not Taking Self Yes   Sig: Take 2 tablets (100 mg) by mouth 2 times a day.   simvastatin (Zocor) 40 mg tablet Not Taking Self No   Sig: TAKE 1 TABLET BY MOUTH EVERY DAY   Patient not taking: Reported on 5/28/2024   traZODone (Desyrel) 50 mg tablet 7/17/2024 at 0900 Self Yes   Sig: TAKE 1 -2 TABLETS AT BEDTIME      Facility-Administered Medications Last Administration Doses Remaining   nicotine (Nicoderm CQ) 21 mg/24 hr patch 1 patch None recorded 1           The list below reflects the updated allergy list. Please review each documented allergy for additional clarification and justification.  Allergies  Reviewed by Neelam Soto, PharmD on 7/18/2024        Severity Reactions Comments    Codeine Medium Nausea Only             Patient declines M2B at discharge. Pharmacy has been updated to [facility] in [place].    Sources used: Pharmacy dispense history, OARRs, patient interview (ok historian- knew names of some drugs and indications, doses and frequencies), family medicine note from 5/16 and cardio note from 7/9    Below are additional concerns with the patient's PTA list.  -pt seems to take what you wants to take and is often forgetful. Pharmacy  dispense history seems to reflect this (inconsistent fills). Pt states that she keeps all her meds in a plastic bag to remember.   -mentions she gets an injection every 6 month for her bones but has not started yet  -please review all the comments listed above in red    Medications ADDED:  none  Medications CHANGED:  Methotrexate 6 tablets to 8 tablets weekly   Medications REMOVED:   Gabapentin  Metformin (states that she hasn't taken over 20 years)  Devante Soto, PharmD, [facility]  Transitions of Care Pharmacist  [facility]  Please reach out via Secure Chat for questions, or if no response call [employer] or [facility]

## 2024-07-18 NOTE — CARE PLAN
The clinical goals for the shift include Patient will remain free from falls and injuries and have adequate pain control      Problem: Fall/Injury  Goal: Not fall by end of shift  Outcome: Progressing  Goal: Be free from injury by end of the shift  Outcome: Progressing  Goal: Verbalize understanding of personal risk factors for fall in the hospital  Outcome: Progressing  Goal: Verbalize understanding of risk factor reduction measures to prevent injury from fall in the home  Outcome: Progressing  Goal: Use assistive devices by end of the shift  Outcome: Progressing  Goal: Pace activities to prevent fatigue by end of the shift  Outcome: Progressing

## 2024-07-18 NOTE — PROGRESS NOTES
I met with Sweta at the bedside regarding discharge planning and home going needs. Patient lives home alone where she is usually independent with ADL's she does have a walker in the home. Patient is pending therapy recommendations for discharge. I will continue to follow with a safe discharge plan.

## 2024-07-18 NOTE — PROGRESS NOTES
"Sweta Lim is a 68 y.o. female on day 1 of admission presenting with Degenerative cervical spinal stenosis.    Subjective   No acute events overnight       Objective     Physical Exam  Awake, Ox3  5/5 x 4  Bilateral clonus  Bruising below incision  No hematoma around incision    Last Recorded Vitals  Blood pressure 166/78, pulse 63, temperature 35.9 °C (96.6 °F), temperature source Temporal, resp. rate 16, height 1.702 m (5' 7\"), weight 74.4 kg (164 lb 0.4 oz), SpO2 99%.  Intake/Output last 3 Shifts:  I/O last 3 completed shifts:  In: 1400 (18.8 mL/kg) [I.V.:600 (8.1 mL/kg); IV Piggyback:800]  Out: 20 (0.3 mL/kg) [Blood:20]  Weight: 74.4 kg         Assessment  68yF h/o HTN, HLD, COPD, DEBRA, asthma, hypothyroidism, DM2, GERD, depression, anxiety, previous C5-6 ACDF, p/w neck pain and myelopathy, found to have L C4-5 paracentral disc, s/p C4-5 ACDF    Plan  Floor  Needs to eat  Out of bed  Uprights  2 week follow-up  PTOT    Lawrence Cline MD      "

## 2024-07-19 ENCOUNTER — APPOINTMENT (OUTPATIENT)
Dept: RADIOLOGY | Facility: HOSPITAL | Age: 69
End: 2024-07-19
Payer: MEDICARE

## 2024-07-19 ENCOUNTER — ANESTHESIA (OUTPATIENT)
Dept: OPERATING ROOM | Facility: HOSPITAL | Age: 69
End: 2024-07-19
Payer: MEDICARE

## 2024-07-19 ENCOUNTER — ANESTHESIA EVENT (OUTPATIENT)
Dept: OPERATING ROOM | Facility: HOSPITAL | Age: 69
End: 2024-07-19
Payer: MEDICARE

## 2024-07-19 PROBLEM — S10.93XA HEMATOMA OF NECK: Status: ACTIVE | Noted: 2024-05-29

## 2024-07-19 LAB
ABO GROUP (TYPE) IN BLOOD: NORMAL
ALBUMIN SERPL BCP-MCNC: 3.6 G/DL (ref 3.4–5)
ALBUMIN SERPL BCP-MCNC: 4 G/DL (ref 3.4–5)
ANION GAP SERPL CALC-SCNC: 11 MMOL/L (ref 10–20)
ANION GAP SERPL CALC-SCNC: 11 MMOL/L (ref 10–20)
ANTIBODY SCREEN: NORMAL
APTT PPP: 31 SECONDS (ref 27–38)
BASOPHILS # BLD AUTO: 0.04 X10*3/UL (ref 0–0.1)
BASOPHILS NFR BLD AUTO: 0.2 %
BUN SERPL-MCNC: 10 MG/DL (ref 6–23)
BUN SERPL-MCNC: 12 MG/DL (ref 6–23)
CA-I BLD-SCNC: 1.23 MMOL/L (ref 1.1–1.33)
CALCIUM SERPL-MCNC: 8.8 MG/DL (ref 8.6–10.6)
CALCIUM SERPL-MCNC: 9.3 MG/DL (ref 8.6–10.6)
CHLORIDE SERPL-SCNC: 102 MMOL/L (ref 98–107)
CHLORIDE SERPL-SCNC: 103 MMOL/L (ref 98–107)
CO2 SERPL-SCNC: 31 MMOL/L (ref 21–32)
CO2 SERPL-SCNC: 31 MMOL/L (ref 21–32)
CREAT SERPL-MCNC: 0.33 MG/DL (ref 0.5–1.05)
CREAT SERPL-MCNC: 0.41 MG/DL (ref 0.5–1.05)
EGFRCR SERPLBLD CKD-EPI 2021: >90 ML/MIN/1.73M*2
EGFRCR SERPLBLD CKD-EPI 2021: >90 ML/MIN/1.73M*2
EOSINOPHIL # BLD AUTO: 0 X10*3/UL (ref 0–0.7)
EOSINOPHIL NFR BLD AUTO: 0 %
ERYTHROCYTE [DISTWIDTH] IN BLOOD BY AUTOMATED COUNT: 15 % (ref 11.5–14.5)
ERYTHROCYTE [DISTWIDTH] IN BLOOD BY AUTOMATED COUNT: 15.3 % (ref 11.5–14.5)
GLUCOSE BLD MANUAL STRIP-MCNC: 106 MG/DL (ref 74–99)
GLUCOSE BLD MANUAL STRIP-MCNC: 118 MG/DL (ref 74–99)
GLUCOSE BLD MANUAL STRIP-MCNC: 120 MG/DL (ref 74–99)
GLUCOSE BLD MANUAL STRIP-MCNC: 123 MG/DL (ref 74–99)
GLUCOSE SERPL-MCNC: 125 MG/DL (ref 74–99)
GLUCOSE SERPL-MCNC: 136 MG/DL (ref 74–99)
HCT VFR BLD AUTO: 40 % (ref 36–46)
HCT VFR BLD AUTO: 41 % (ref 36–46)
HGB BLD-MCNC: 13.2 G/DL (ref 12–16)
HGB BLD-MCNC: 13.2 G/DL (ref 12–16)
IMM GRANULOCYTES # BLD AUTO: 0.06 X10*3/UL (ref 0–0.7)
IMM GRANULOCYTES NFR BLD AUTO: 0.4 % (ref 0–0.9)
INR PPP: 1.1 (ref 0.9–1.1)
LYMPHOCYTES # BLD AUTO: 0.87 X10*3/UL (ref 1.2–4.8)
LYMPHOCYTES NFR BLD AUTO: 5.4 %
MAGNESIUM SERPL-MCNC: 1.87 MG/DL (ref 1.6–2.4)
MCH RBC QN AUTO: 33.4 PG (ref 26–34)
MCH RBC QN AUTO: 34.2 PG (ref 26–34)
MCHC RBC AUTO-ENTMCNC: 32.2 G/DL (ref 32–36)
MCHC RBC AUTO-ENTMCNC: 33 G/DL (ref 32–36)
MCV RBC AUTO: 104 FL (ref 80–100)
MCV RBC AUTO: 104 FL (ref 80–100)
MONOCYTES # BLD AUTO: 1.03 X10*3/UL (ref 0.1–1)
MONOCYTES NFR BLD AUTO: 6.4 %
NEUTROPHILS # BLD AUTO: 14.04 X10*3/UL (ref 1.2–7.7)
NEUTROPHILS NFR BLD AUTO: 87.6 %
NRBC BLD-RTO: 0 /100 WBCS (ref 0–0)
NRBC BLD-RTO: 0 /100 WBCS (ref 0–0)
PHOSPHATE SERPL-MCNC: 2.5 MG/DL (ref 2.5–4.9)
PHOSPHATE SERPL-MCNC: 3.1 MG/DL (ref 2.5–4.9)
PLATELET # BLD AUTO: 255 X10*3/UL (ref 150–450)
PLATELET # BLD AUTO: 273 X10*3/UL (ref 150–450)
POTASSIUM SERPL-SCNC: 3.9 MMOL/L (ref 3.5–5.3)
POTASSIUM SERPL-SCNC: 4.4 MMOL/L (ref 3.5–5.3)
PROTHROMBIN TIME: 12.6 SECONDS (ref 9.8–12.8)
RBC # BLD AUTO: 3.86 X10*6/UL (ref 4–5.2)
RBC # BLD AUTO: 3.95 X10*6/UL (ref 4–5.2)
RH FACTOR (ANTIGEN D): NORMAL
SODIUM SERPL-SCNC: 140 MMOL/L (ref 136–145)
SODIUM SERPL-SCNC: 141 MMOL/L (ref 136–145)
WBC # BLD AUTO: 14.2 X10*3/UL (ref 4.4–11.3)
WBC # BLD AUTO: 16 X10*3/UL (ref 4.4–11.3)

## 2024-07-19 PROCEDURE — 2020000001 HC ICU ROOM DAILY

## 2024-07-19 PROCEDURE — 82947 ASSAY GLUCOSE BLOOD QUANT: CPT

## 2024-07-19 PROCEDURE — 2500000004 HC RX 250 GENERAL PHARMACY W/ HCPCS (ALT 636 FOR OP/ED): Performed by: STUDENT IN AN ORGANIZED HEALTH CARE EDUCATION/TRAINING PROGRAM

## 2024-07-19 PROCEDURE — 36415 COLL VENOUS BLD VENIPUNCTURE: CPT | Performed by: STUDENT IN AN ORGANIZED HEALTH CARE EDUCATION/TRAINING PROGRAM

## 2024-07-19 PROCEDURE — 85610 PROTHROMBIN TIME: CPT | Performed by: STUDENT IN AN ORGANIZED HEALTH CARE EDUCATION/TRAINING PROGRAM

## 2024-07-19 PROCEDURE — 2500000004 HC RX 250 GENERAL PHARMACY W/ HCPCS (ALT 636 FOR OP/ED)

## 2024-07-19 PROCEDURE — 2500000004 HC RX 250 GENERAL PHARMACY W/ HCPCS (ALT 636 FOR OP/ED): Performed by: REGISTERED NURSE

## 2024-07-19 PROCEDURE — 85027 COMPLETE CBC AUTOMATED: CPT | Performed by: STUDENT IN AN ORGANIZED HEALTH CARE EDUCATION/TRAINING PROGRAM

## 2024-07-19 PROCEDURE — 80069 RENAL FUNCTION PANEL: CPT | Performed by: REGISTERED NURSE

## 2024-07-19 PROCEDURE — 86901 BLOOD TYPING SEROLOGIC RH(D): CPT | Performed by: STUDENT IN AN ORGANIZED HEALTH CARE EDUCATION/TRAINING PROGRAM

## 2024-07-19 PROCEDURE — 85025 COMPLETE CBC W/AUTO DIFF WBC: CPT | Performed by: REGISTERED NURSE

## 2024-07-19 PROCEDURE — 70360 X-RAY EXAM OF NECK: CPT | Performed by: RADIOLOGY

## 2024-07-19 PROCEDURE — 82330 ASSAY OF CALCIUM: CPT | Performed by: REGISTERED NURSE

## 2024-07-19 PROCEDURE — 2500000005 HC RX 250 GENERAL PHARMACY W/O HCPCS

## 2024-07-19 PROCEDURE — 70360 X-RAY EXAM OF NECK: CPT

## 2024-07-19 PROCEDURE — 3700000001 HC GENERAL ANESTHESIA TIME - INITIAL BASE CHARGE: Performed by: NEUROLOGICAL SURGERY

## 2024-07-19 PROCEDURE — 0KC30ZZ EXTIRPATION OF MATTER FROM LEFT NECK MUSCLE, OPEN APPROACH: ICD-10-PCS | Performed by: NEUROLOGICAL SURGERY

## 2024-07-19 PROCEDURE — 83735 ASSAY OF MAGNESIUM: CPT | Performed by: REGISTERED NURSE

## 2024-07-19 PROCEDURE — 2780000003 HC OR 278 NO HCPCS: Performed by: NEUROLOGICAL SURGERY

## 2024-07-19 PROCEDURE — 2720000007 HC OR 272 NO HCPCS: Performed by: NEUROLOGICAL SURGERY

## 2024-07-19 PROCEDURE — 3600000017 HC OR TIME - EACH INCREMENTAL 1 MINUTE - PROCEDURE LEVEL SIX: Performed by: NEUROLOGICAL SURGERY

## 2024-07-19 PROCEDURE — 35800 EXPLORE NECK VESSELS: CPT | Performed by: NEUROLOGICAL SURGERY

## 2024-07-19 PROCEDURE — 3600000018 HC OR TIME - INITIAL BASE CHARGE - PROCEDURE LEVEL SIX: Performed by: NEUROLOGICAL SURGERY

## 2024-07-19 PROCEDURE — 96372 THER/PROPH/DIAG INJ SC/IM: CPT | Performed by: STUDENT IN AN ORGANIZED HEALTH CARE EDUCATION/TRAINING PROGRAM

## 2024-07-19 PROCEDURE — 3700000002 HC GENERAL ANESTHESIA TIME - EACH INCREMENTAL 1 MINUTE: Performed by: NEUROLOGICAL SURGERY

## 2024-07-19 PROCEDURE — 36415 COLL VENOUS BLD VENIPUNCTURE: CPT | Performed by: REGISTERED NURSE

## 2024-07-19 PROCEDURE — 80069 RENAL FUNCTION PANEL: CPT | Performed by: STUDENT IN AN ORGANIZED HEALTH CARE EDUCATION/TRAINING PROGRAM

## 2024-07-19 RX ORDER — ONDANSETRON HYDROCHLORIDE 2 MG/ML
INJECTION, SOLUTION INTRAVENOUS AS NEEDED
Status: DISCONTINUED | OUTPATIENT
Start: 2024-07-19 | End: 2024-07-19

## 2024-07-19 RX ORDER — SODIUM CHLORIDE, SODIUM LACTATE, POTASSIUM CHLORIDE, CALCIUM CHLORIDE 600; 310; 30; 20 MG/100ML; MG/100ML; MG/100ML; MG/100ML
INJECTION, SOLUTION INTRAVENOUS CONTINUOUS PRN
Status: DISCONTINUED | OUTPATIENT
Start: 2024-07-19 | End: 2024-07-19

## 2024-07-19 RX ORDER — MIDAZOLAM HYDROCHLORIDE 1 MG/ML
INJECTION INTRAMUSCULAR; INTRAVENOUS AS NEEDED
Status: DISCONTINUED | OUTPATIENT
Start: 2024-07-19 | End: 2024-07-19

## 2024-07-19 RX ORDER — CEFAZOLIN 1 G/1
INJECTION, POWDER, FOR SOLUTION INTRAVENOUS AS NEEDED
Status: DISCONTINUED | OUTPATIENT
Start: 2024-07-19 | End: 2024-07-19

## 2024-07-19 RX ORDER — NORETHINDRONE AND ETHINYL ESTRADIOL 0.5-0.035
KIT ORAL AS NEEDED
Status: DISCONTINUED | OUTPATIENT
Start: 2024-07-19 | End: 2024-07-19

## 2024-07-19 RX ORDER — PROPOFOL 10 MG/ML
INJECTION, EMULSION INTRAVENOUS AS NEEDED
Status: DISCONTINUED | OUTPATIENT
Start: 2024-07-19 | End: 2024-07-19

## 2024-07-19 RX ORDER — HYDROMORPHONE HYDROCHLORIDE 1 MG/ML
INJECTION, SOLUTION INTRAMUSCULAR; INTRAVENOUS; SUBCUTANEOUS AS NEEDED
Status: DISCONTINUED | OUTPATIENT
Start: 2024-07-19 | End: 2024-07-19

## 2024-07-19 RX ORDER — SUCCINYLCHOLINE CHLORIDE 100 MG/5ML
SYRINGE (ML) INTRAVENOUS AS NEEDED
Status: DISCONTINUED | OUTPATIENT
Start: 2024-07-19 | End: 2024-07-19

## 2024-07-19 RX ORDER — PHENYLEPHRINE HCL IN 0.9% NACL 0.4MG/10ML
SYRINGE (ML) INTRAVENOUS AS NEEDED
Status: DISCONTINUED | OUTPATIENT
Start: 2024-07-19 | End: 2024-07-19

## 2024-07-19 RX ORDER — FENTANYL CITRATE 50 UG/ML
INJECTION, SOLUTION INTRAMUSCULAR; INTRAVENOUS AS NEEDED
Status: DISCONTINUED | OUTPATIENT
Start: 2024-07-19 | End: 2024-07-19

## 2024-07-19 RX ORDER — MAGNESIUM SULFATE HEPTAHYDRATE 40 MG/ML
INJECTION, SOLUTION INTRAVENOUS AS NEEDED
Status: DISCONTINUED | OUTPATIENT
Start: 2024-07-19 | End: 2024-07-19

## 2024-07-19 RX ORDER — ROCURONIUM BROMIDE 10 MG/ML
INJECTION, SOLUTION INTRAVENOUS AS NEEDED
Status: DISCONTINUED | OUTPATIENT
Start: 2024-07-19 | End: 2024-07-19

## 2024-07-19 SDOH — HEALTH STABILITY: MENTAL HEALTH: CURRENT SMOKER: 0

## 2024-07-19 ASSESSMENT — PAIN DESCRIPTION - ORIENTATION: ORIENTATION: MID

## 2024-07-19 ASSESSMENT — PAIN DESCRIPTION - DESCRIPTORS
DESCRIPTORS: ACHING
DESCRIPTORS: NUMBNESS
DESCRIPTORS: ACHING

## 2024-07-19 ASSESSMENT — PAIN SCALES - GENERAL
PAINLEVEL_OUTOF10: 5 - MODERATE PAIN
PAINLEVEL_OUTOF10: 7
PAINLEVEL_OUTOF10: 5 - MODERATE PAIN
PAINLEVEL_OUTOF10: 7
PAINLEVEL_OUTOF10: 5 - MODERATE PAIN
PAINLEVEL_OUTOF10: 7
PAINLEVEL_OUTOF10: 8
PAINLEVEL_OUTOF10: 5 - MODERATE PAIN
PAINLEVEL_OUTOF10: 8
PAINLEVEL_OUTOF10: 5 - MODERATE PAIN
PAIN_LEVEL: 0
PAINLEVEL_OUTOF10: 7
PAINLEVEL_OUTOF10: 8
PAINLEVEL_OUTOF10: 8
PAINLEVEL_OUTOF10: 5 - MODERATE PAIN

## 2024-07-19 ASSESSMENT — COGNITIVE AND FUNCTIONAL STATUS - GENERAL
MOVING TO AND FROM BED TO CHAIR: A LITTLE
STANDING UP FROM CHAIR USING ARMS: A LITTLE
PERSONAL GROOMING: A LITTLE
DRESSING REGULAR LOWER BODY CLOTHING: A LITTLE
MOBILITY SCORE: 16
DRESSING REGULAR UPPER BODY CLOTHING: A LITTLE
CLIMB 3 TO 5 STEPS WITH RAILING: TOTAL
HELP NEEDED FOR BATHING: A LITTLE
WALKING IN HOSPITAL ROOM: A LITTLE
TURNING FROM BACK TO SIDE WHILE IN FLAT BAD: A LITTLE
TOILETING: A LITTLE
MOVING FROM LYING ON BACK TO SITTING ON SIDE OF FLAT BED WITH BEDRAILS: A LITTLE
DAILY ACTIVITIY SCORE: 19

## 2024-07-19 ASSESSMENT — PAIN DESCRIPTION - LOCATION
LOCATION: NECK

## 2024-07-19 NOTE — PROGRESS NOTES
[facility]  NEUROSCIENCE INTENSIVE CARE UNIT  DAILY PROGRESS NOTE       Patient Name: Sweta Lim   MRN: [id number]     Admit Date: 2024     : 1955 AGE: 68 y.o. GENDER: female        Subjective    68 y.o. female with PMH HTN, HLD, COPD, DEBRA, asthma, HypoT4, T2DM, GERD, depression, anxiety, previous C5-6 ACDF. Admitted 2024 with Degenerative cervical spinal stenosis after presenting with neck pain and myelopathy.  Found to have L C4-5 paracentral disc bulge resulting in severe central stenosis as well as R paracentric disc bulge at C6-7 resulting in severe R foraminal stenosis.  s/p C4-C5 ACDF.     Interval Events:   -  course c/b anterior neck hematoma requiring to be upgraded to NSU status     Objective   VITALS (24H):  Temp:  [35.8 °C (96.4 °F)-36.4 °C (97.5 °F)] 36 °C (96.8 °F)  Heart Rate:  [58-68] 65  Resp:  [16] 16  BP: (131-168)/(72-83) 158/72  INTAKE/OUTPUT:No intake or output data in the 24 hours ending 24 0429  VENT SETTINGS:        PHYSICAL EXAM:  NEURO:  - Awake, Alert, oriented x4, follows commands  - EOS, PERRL 3mm-->2mm bilaterally, EOMI, VFF  - FAYE 5/5 strength, no drift, no ataxia  - RLE chonic drop foot   - Anterior neck hematoma near incision   CV:  - RRR on telemetry, NSR  RESP:  - Regular, unlabored  - Oxygen: 2L NC  :  - Voids  GI:  - Abdomen NT/ND, soft  SKIN:  - Intact    MEDICATIONS:  Scheduled: PRN: Continuous:   acetaminophen, 650 mg, oral, q6h  busPIRone, 10 mg, oral, Daily  busPIRone, 20 mg, oral, BID  cyclobenzaprine, 10 mg, oral, TID  dexAMETHasone, 2 mg, intravenous, q8h  escitalopram, 20 mg, oral, Daily  ezetimibe, 10 mg, oral, Daily  tiotropium, 2 puff, inhalation, Daily   And  fluticasone furoate-vilanteroL, 1 puff, inhalation, Daily  folic acid, 1 mg, oral, Daily  gabapentin, 300 mg, oral, BID  heparin (porcine), 5,000 Units, subcutaneous, q8h  insulin degludec, 15 Units, subcutaneous, Nightly  insulin lispro, 0-10 Units,  subcutaneous, TID  levothyroxine, 75 mcg, oral, Daily  nicotine, 1 patch, transdermal, Once  pantoprazole, 40 mg, oral, Daily before breakfast  polyethylene glycol, 17 g, oral, Daily  primidone, 100 mg, oral, BID  sennosides-docusate sodium, 2 tablet, oral, BID  simvastatin, 40 mg, oral, Daily     PRN medications: benzocaine-menthol, clonazePAM, dextrose, dextrose, glucagon, glucagon, HYDROmorphone, naloxone, ondansetron ODT **OR** ondansetron, oxyCODONE, oxyCODONE, traZODone       LAB RESULTS:  Results from last 72 hours   Lab Units 07/18/24  0725   GLUCOSE mg/dL 122*   SODIUM mmol/L 137   POTASSIUM mmol/L 4.8   CHLORIDE mmol/L 101   CO2 mmol/L 28   ANION GAP mmol/L 13   BUN mg/dL 18   CREATININE mg/dL 0.46*   EGFR mL/min/1.73m*2 >90   CALCIUM mg/dL 8.9      Results from last 72 hours   Lab Units 07/18/24  0725   WBC AUTO x10*3/uL 10.7   NRBC AUTO /100 WBCs 0.0   RBC AUTO x10*6/uL 4.25   HEMOGLOBIN g/dL 14.3   HEMATOCRIT % 45.0   MCV fL 106*   MCH pg 33.6   MCHC g/dL 31.8*   RDW % 15.4*   PLATELETS AUTO x10*3/uL 270      Results from last 72 hours   Lab Units 07/18/24  0725   WBC AUTO x10*3/uL 10.7   NRBC AUTO /100 WBCs 0.0   RBC AUTO x10*6/uL 4.25   HEMOGLOBIN g/dL 14.3   HEMATOCRIT % 45.0   MCV fL 106*   MCH pg 33.6   MCHC g/dL 31.8*   RDW % 15.4*   PLATELETS AUTO x10*3/uL 270             IMAGING RESULTS:  XR cervical spine 2-3 views   Final Result   Surgical and degenerative change without osseous injury evident.        MACRO:   None        Signed by: Chapito Wright 7/18/2024 1:24 PM   Dictation workstation:   [id number]      FL fluoro images no charge   Final Result      FL less than 1 hour    (Results Pending)   XR neck soft tissue lateral    (Results Pending)         Assessment/Plan    NEURO:  #L C4-5 paracentral disc bulge resulting in severe central stenosis as well as R paracentric disc bulge at C6-7 resulting in severe R foraminal stenosis s/p C4-C5 ACDF c/b anterior neck hematoma   #depression,  anxiety, previous C5-6 ACDF  Assessment:  - Neurologically:   - Awake, Alert, oriented x4, follows commands  - EOS, PERRL 3mm-->2mm bilaterally, EOMI, VFF  - FAYE 5/5 strength, no drift, no ataxia  - RLE chonic drop foot   - Anterior neck hematoma near incision   Plan:  - NSU  - Neuro Checks: Q1H  - Pain: acetaminophen Q6H, oxycodone PRN, Q4H, and hydromorphone PRN, Q4H  - Nausea: ondansetron  - Flexeril 10mg TID   - Dex 2q8h  - Escitalopram 20mg qd  - Gabapentin 300mg BID  - Buspirone 20mg BID, changed to 10mg qd starting 7/19  - Trazodone 50mg qHS  - Clonazepam 0.5mg q24h  - PT/OT/SLP  - XR of neck to monitor neck hematoma    CARDIOVASCULAR:  #HTN, HLD  Assessment:  - SR on tele  - ECHO 5/10/23: EF 55-60%, AV sclerosis     Plan:  - Continue to monitor on telemetry  - BP goal: SBP < 180    --> PRN: Labetalol and Hydralazine   - c/w home simvastatin 40mg qd ezetimibe 10mg qd    RESPIRATORY:  #COPD, DEBRA, asthma  Assessment:  - On 2L NC  Plan:  - Continuous pulse oximetry   - O2 PRN to maintain SpO2 > 94%, wean as tolerated  - Incentive spirometry while awake  - Nicotine patch   - c/w home spiriva and breo     RENAL/:  #GERD  Assessment:  Results from last 72 hours   Lab Units 07/18/24  0725   BUN mg/dL 18   CREATININE mg/dL 0.46*   Plan:  - Monitor with daily RFP  -  Monitor UOP    FEN/GI:  #Constipation   Assessment:  - Last BM: PTA  Plan:  - Monitor and replace electrolytes per protocol  - Diet: NPO   - Bowel Regimen: Docusate-Senna BID  - Add miralax qd    ENDOCRINE:  #HypoT4, T2DM  Assessment:  Results from last 7 days   Lab Units 07/18/24 2003 07/18/24  1700 07/18/24  1119 07/18/24  0827 07/18/24  0725 07/18/24  0244 07/17/24  2222   POCT GLUCOSE mg/dL 128* 148* 114* 125*  --  186* 216*   GLUCOSE mg/dL  --   --   --   --  122*  --   --       Plan:  - Darwin & ISS #2  AC&HS   - Tresiba 15u qHS  - levothyroxine 75mcg qd  HEMATOLOGY:  Assessment:  Results from last 7 days   Lab Units 07/18/24  0278    HEMOGLOBIN g/dL 14.3   HEMATOCRIT % 45.0   PLATELETS AUTO x10*3/uL 270     Plan:  - Continue to monitor with daily CBC and Coag panel    INFECTIOUS DISEASE:  Assessment:  Results from last 7 days   Lab Units 24  0725   WBC AUTO x10*3/uL 10.7    - Temp (24hrs), Av.1 °C (96.9 °F), Min:35.8 °C (96.4 °F), Max:36.4 °C (97.5 °F)  Plan:  - Continue to monitor for s/sx of infection  - Pan culture for temperature > 38.4 C    MUSCULOSKELETAL:  - No acute issues    SKIN:  - No acute issues  - Turns and skin care per NSU protocol    ACCESS:  - PIVs    PROPHYLAXIS:  - DVT Ppx: SCDs and SQH  - GI Ppx: Pantoprazole    RESTRAINTS:  Not indicated/Patient does not meet criteria for restraints    RENETTA Covarrubias-CNP  Neuroscience Intensive Care       Total critical care time of 60 minutes, with > 50% of time spent in direct contact with patient/family for education, counseling and coordination of care.

## 2024-07-19 NOTE — PROGRESS NOTES
[facility]  NEUROSCIENCE INTENSIVE CARE UNIT  DAILY PROGRESS NOTE       Patient Name: Sweta Lim   MRN: [id number]     Admit Date: 2024     : 1955 AGE: 68 y.o. GENDER: female        Subjective    68 y.o. female with PMH HTN, HLD, COPD, DEBRA, asthma, HypoT4, T2DM, GERD, depression, anxiety, previous C5-6 ACDF. Admitted 2024 with Degenerative cervical spinal stenosis after presenting with neck pain and myelopathy.  Found to have L C4-5 paracentral disc bulge resulting in severe central stenosis as well as R paracentric disc bulge at C6-7 resulting in severe R foraminal stenosis.  s/p C4-C5 ACDF.     Interval Events:   -  course c/b anterior neck hematoma requiring to be upgraded to NSU status  - Neck hematoma stable as of this AM, pt states improved swallowing with suction at bedside   - NAEON     Objective   VITALS (24H):  Temp:  [36 °C (96.8 °F)-36.4 °C (97.5 °F)] 36 °C (96.8 °F)  Heart Rate:  [63-78] 78  Resp:  [16-19] 18  BP: (108-177)/(61-80) 160/73  INTAKE/OUTPUT:No intake or output data in the 24 hours ending 24 0724  VENT SETTINGS:      On NC    PHYSICAL EXAM:  NEURO:  - Awake, Alert, oriented x4, follows commands  - EOS, PERRL 3mm-->2mm bilaterally, EOMI, VFF  - FAYE 5/5 strength, no drift, no ataxia  - RLE chonic drop foot   - Anterior neck hematoma near incision   CV:  - RRR on telemetry, NSR  RESP:  - Regular, unlabored  - Oxygen: 2L NC  :  - Voids  GI:  - Abdomen NT/ND, soft  SKIN:  - Intact    MEDICATIONS:  Scheduled: PRN: Continuous:   acetaminophen, 650 mg, oral, q6h  busPIRone, 10 mg, oral, Daily  busPIRone, 20 mg, oral, BID  cyclobenzaprine, 10 mg, oral, TID  dexAMETHasone, 2 mg, intravenous, q8h  escitalopram, 20 mg, oral, Daily  ezetimibe, 10 mg, oral, Daily  tiotropium, 2 puff, inhalation, Daily   And  fluticasone furoate-vilanteroL, 1 puff, inhalation, Daily  folic acid, 1 mg, oral, Daily  gabapentin, 300 mg, oral, BID  heparin (porcine), 5,000  Units, subcutaneous, q8h  insulin degludec, 15 Units, subcutaneous, Nightly  insulin lispro, 0-10 Units, subcutaneous, TID  levothyroxine, 75 mcg, oral, Daily  nicotine, 1 patch, transdermal, Once  pantoprazole, 40 mg, oral, Daily before breakfast  polyethylene glycol, 17 g, oral, Daily  primidone, 100 mg, oral, BID  sennosides-docusate sodium, 2 tablet, oral, BID  simvastatin, 40 mg, oral, Daily     PRN medications: benzocaine-menthol, clonazePAM, dextrose, dextrose, glucagon, glucagon, HYDROmorphone, naloxone, ondansetron ODT **OR** ondansetron, oxyCODONE, oxyCODONE, traZODone       LAB RESULTS:  Results from last 72 hours   Lab Units 07/19/24 0445 07/18/24  0725   GLUCOSE mg/dL  --  122*   SODIUM mmol/L  --  137   POTASSIUM mmol/L  --  4.8   CHLORIDE mmol/L  --  101   CO2 mmol/L  --  28   ANION GAP mmol/L  --  13   BUN mg/dL  --  18   CREATININE mg/dL  --  0.46*   EGFR mL/min/1.73m*2  --  >90   CALCIUM mg/dL  --  8.9   POCT CALCIUM IONIZED (MMOL/L) IN BLOOD mmol/L 1.23  --       Results from last 72 hours   Lab Units 07/19/24 0445 07/18/24  0725   WBC AUTO x10*3/uL 16.0* 10.7   NRBC AUTO /100 WBCs 0.0 0.0   RBC AUTO x10*6/uL 3.95* 4.25   HEMOGLOBIN g/dL 13.2 14.3   HEMATOCRIT % 41.0 45.0   MCV fL 104* 106*   MCH pg 33.4 33.6   MCHC g/dL 32.2 31.8*   RDW % 15.3* 15.4*   PLATELETS AUTO x10*3/uL 273 270      Results from last 72 hours   Lab Units 07/19/24 0445 07/18/24  0725   WBC AUTO x10*3/uL 16.0* 10.7   NRBC AUTO /100 WBCs 0.0 0.0   RBC AUTO x10*6/uL 3.95* 4.25   HEMOGLOBIN g/dL 13.2 14.3   HEMATOCRIT % 41.0 45.0   MCV fL 104* 106*   MCH pg 33.4 33.6   MCHC g/dL 32.2 31.8*   RDW % 15.3* 15.4*   PLATELETS AUTO x10*3/uL 273 270   NEUTROS PCT AUTO % 87.6  --    IG PCT AUTO % 0.4  --    LYMPHS PCT AUTO % 5.4  --    MONOS PCT AUTO % 6.4  --    EOS PCT AUTO % 0.0  --    BASOS PCT AUTO % 0.2  --    NEUTROS ABS x10*3/uL 14.04*  --    IG AUTO x10*3/uL 0.06  --    LYMPHS ABS AUTO x10*3/uL 0.87*  --    MONOS ABS AUTO  x10*3/uL 1.03*  --    EOS ABS AUTO x10*3/uL 0.00  --    BASOS ABS AUTO x10*3/uL 0.04  --              IMAGING RESULTS:  XR neck soft tissue lateral         XR cervical spine 2-3 views   Final Result   Surgical and degenerative change without osseous injury evident.        MACRO:   None        Signed by: Chapito Wright 7/18/2024 1:24 PM   Dictation workstation:   [id number]      FL fluoro images no charge   Final Result      FL less than 1 hour    (Results Pending)         Assessment/Plan    NEURO:  #L C4-5 paracentral disc bulge resulting in severe central stenosis as well as R paracentric disc bulge at C6-7 resulting in severe R foraminal stenosis s/p C4-C5 ACDF c/b anterior neck hematoma   #depression, anxiety, previous C5-6 ACDF  Assessment:  - Neurologically:   - Awake, Alert, oriented x4, follows commands  - EOS, PERRL 3mm-->2mm bilaterally, EOMI, VFF  - FAYE 5/5 strength, no drift, no ataxia  - RLE chonic drop foot   - Anterior neck hematoma near incision   - 7/17: s/p C4-C5 ACDF c/b anterior neck hematoma   Plan:  - NSU  - Neuro Checks: Q1H  - Pain: acetaminophen Q6H, oxycodone PRN, Q4H, and hydromorphone PRN, Q4H  - Nausea: ondansetron  - Flexeril 10mg TID   - Dex 2q8h  - Escitalopram 20mg qd  - Gabapentin 300mg BID  - Buspirone 20mg BID, changed to 10mg qd starting 7/19  - Trazodone 50mg qHS  - Clonazepam 0.5mg q24h  - PT/OT/SLP  - XR of neck to monitor neck hematoma  - Pre-op for possible OR    CARDIOVASCULAR:  #HTN, HLD  Assessment:  - SR on tele  - ECHO 5/10/23: EF 55-60%, AV sclerosis     Plan:  - Continue to monitor on telemetry  - BP goal: SBP < 180    --> PRN: Labetalol and Hydralazine   - c/w home simvastatin 40mg qd ezetimibe 10mg qd    RESPIRATORY:  #COPD, DEBRA, asthma  Assessment:  - On 2L NC  Plan:  - Continuous pulse oximetry   - O2 PRN to maintain SpO2 > 94%, wean as tolerated  - Incentive spirometry while awake  - Nicotine patch   - c/w home spiriva and breo      RENAL/:  #GERD  Assessment:  Results from last 72 hours   Lab Units 24  0725   BUN mg/dL 18   CREATININE mg/dL 0.46*   Plan:  - Monitor with daily RFP  -  Monitor UOP    FEN/GI:  #Constipation   Assessment:  - Last BM: PTA  Plan:  - Monitor and replace electrolytes per protocol  - Diet: NPO   - Bowel Regimen: Docusate-Senna BID  - Add miralax qd    ENDOCRINE:  #HypoT4, T2DM  Assessment:  Results from last 7 days   Lab Units 24  1700 24  1119 24  0827 24  0725 24  0244 24  2222   POCT GLUCOSE mg/dL 128* 148* 114* 125*  --  186* 216*   GLUCOSE mg/dL  --   --   --   --  122*  --   --       Plan:  - Accuchecks & ISS #2  AC&HS   - Tresiba 15u qHS  - levothyroxine 75mcg qd  HEMATOLOGY:  Assessment:  Results from last 7 days   Lab Units 24  0725   HEMOGLOBIN g/dL 13.2 14.3   HEMATOCRIT % 41.0 45.0   PLATELETS AUTO x10*3/uL 273 270     Plan:  - Continue to monitor with daily CBC and Coag panel    INFECTIOUS DISEASE:  Assessment:  Results from last 7 days   Lab Units 24  0725   WBC AUTO x10*3/uL 16.0* 10.7    - Temp (24hrs), Av.2 °C (97.2 °F), Min:36 °C (96.8 °F), Max:36.4 °C (97.5 °F)  Plan:  - Continue to monitor for s/sx of infection  - Pan culture for temperature > 38.4 C    MUSCULOSKELETAL:  - No acute issues    SKIN:  - No acute issues  - Turns and skin care per NSU protocol    ACCESS:  - PIVs    PROPHYLAXIS:  - DVT Ppx: SCDs and SQH - SQH on HOLD   - GI Ppx: Pantoprazole    RESTRAINTS:  Not indicated/Patient does not meet criteria for restraints    Alexandro Onofre MD  Department of Neurosurgery, PGY-1     The patient is critically ill with respiratory distress from retropharyngeal hematoma following cervical discectomy  Neurologically stable  Cont steroids per neurosurgery  Plan for evacuation of hematoma today    I have seen and examined the patient.  I have reviewed the patient's laboratory, radiographic,  and clinical data.  I have spent 30 minutes providing critical care for the patient.      AMY Sun M.D.

## 2024-07-19 NOTE — NURSING NOTE
"This RN was called into patient room at approx. 2115 on 7/18/24 with patient complaining of shortness of breath, coughing, and feeling \"like there was a wad of something in her throat\" vitals and o2 sats WNL at this time. Provider notified and assessed patient at bedside. Noticeable increase in edema to patient's anterior neck. Patient placed on o2 and continuous pulse ox for increased monitoring. Patient made NPO at this time. AT approx. 0340 patient was transferred to neuro ICU. Patient remains stable. Very coarse cough and grunting noted. Report given to ICU nurse and transfer completed safely. No other needs at this time.  "

## 2024-07-19 NOTE — PROGRESS NOTES
Physical Therapy                 Therapy Communication Note    Patient Name: Sweta Lim  MRN: [id number]  Today's Date: 7/19/2024     Discipline: Physical Therapy    Missed Visit Reason: Missed Visit Reason:  (Per RN, pt pending OR today for wound washout due to hematoma at surgical site. Plan to hold PT at this time.)    Missed Time: Attempt

## 2024-07-19 NOTE — PROGRESS NOTES
"Sweta Lim is a 68 y.o. female on day 1 of admission presenting with Degenerative cervical spinal stenosis.    Subjective   Patient with increased neck swelling concerning for hematoma overnight, on exam patient with hoarse voice and difficulty swallowing, however difficulty with airway, breathing comfortably on room air        Objective     Physical Exam  Awake, Ox3  5/5 x 4  Bilateral clonus  Large amount of neck swelling    Last Recorded Vitals  Blood pressure 160/73, pulse 78, temperature 36 °C (96.8 °F), resp. rate 18, height 1.702 m (5' 7\"), weight 77 kg (169 lb 12.1 oz), SpO2 95%.  Intake/Output last 3 Shifts:  I/O last 3 completed shifts:  In: 1400 (18.2 mL/kg) [I.V.:600 (7.8 mL/kg); IV Piggyback:800]  Out: 20 (0.3 mL/kg) [Blood:20]  Weight: 77 kg         Assessment  68yF h/o HTN, HLD, COPD, DEBRA, asthma, hypothyroidism, DM2, GERD, depression, anxiety, previous C5-6 ACDF, p/w neck pain and myelopathy, found to have L C4-5 paracentral disc, s/p C4-5 ACDF    7/19 Patient with increased neck swelling concerning for hematoma overnight, on exam patient with hoarse voice and difficulty swallowing, however difficulty with airway, breathing comfortably on room air     Lateral neck xray demonstrated sig incr neck swelling compared to prior c spine xrays    Plan  NSU   Likely OR today for neck hematoma evacuation   Continue to hold ASA  PTOT-SNF    Medina Roth MD      "

## 2024-07-20 ENCOUNTER — APPOINTMENT (OUTPATIENT)
Dept: RADIOLOGY | Facility: HOSPITAL | Age: 69
End: 2024-07-20
Payer: MEDICARE

## 2024-07-20 LAB
ALBUMIN SERPL BCP-MCNC: 3.4 G/DL (ref 3.4–5)
ANION GAP SERPL CALC-SCNC: 14 MMOL/L (ref 10–20)
BASOPHILS # BLD AUTO: 0.02 X10*3/UL (ref 0–0.1)
BASOPHILS NFR BLD AUTO: 0.2 %
BUN SERPL-MCNC: 10 MG/DL (ref 6–23)
CA-I BLD-SCNC: 1.15 MMOL/L (ref 1.1–1.33)
CALCIUM SERPL-MCNC: 8.7 MG/DL (ref 8.6–10.6)
CHLORIDE SERPL-SCNC: 100 MMOL/L (ref 98–107)
CO2 SERPL-SCNC: 28 MMOL/L (ref 21–32)
CREAT SERPL-MCNC: 0.4 MG/DL (ref 0.5–1.05)
EGFRCR SERPLBLD CKD-EPI 2021: >90 ML/MIN/1.73M*2
EOSINOPHIL # BLD AUTO: 0.03 X10*3/UL (ref 0–0.7)
EOSINOPHIL NFR BLD AUTO: 0.2 %
ERYTHROCYTE [DISTWIDTH] IN BLOOD BY AUTOMATED COUNT: 14.6 % (ref 11.5–14.5)
GLUCOSE BLD MANUAL STRIP-MCNC: 128 MG/DL (ref 74–99)
GLUCOSE BLD MANUAL STRIP-MCNC: 131 MG/DL (ref 74–99)
GLUCOSE BLD MANUAL STRIP-MCNC: 144 MG/DL (ref 74–99)
GLUCOSE BLD MANUAL STRIP-MCNC: 150 MG/DL (ref 74–99)
GLUCOSE SERPL-MCNC: 163 MG/DL (ref 74–99)
HCT VFR BLD AUTO: 36.4 % (ref 36–46)
HGB BLD-MCNC: 12.4 G/DL (ref 12–16)
IMM GRANULOCYTES # BLD AUTO: 0.04 X10*3/UL (ref 0–0.7)
IMM GRANULOCYTES NFR BLD AUTO: 0.3 % (ref 0–0.9)
LYMPHOCYTES # BLD AUTO: 0.64 X10*3/UL (ref 1.2–4.8)
LYMPHOCYTES NFR BLD AUTO: 5 %
MAGNESIUM SERPL-MCNC: 2.07 MG/DL (ref 1.6–2.4)
MCH RBC QN AUTO: 33.6 PG (ref 26–34)
MCHC RBC AUTO-ENTMCNC: 34.1 G/DL (ref 32–36)
MCV RBC AUTO: 99 FL (ref 80–100)
MONOCYTES # BLD AUTO: 0.69 X10*3/UL (ref 0.1–1)
MONOCYTES NFR BLD AUTO: 5.4 %
NEUTROPHILS # BLD AUTO: 11.34 X10*3/UL (ref 1.2–7.7)
NEUTROPHILS NFR BLD AUTO: 88.9 %
NRBC BLD-RTO: 0 /100 WBCS (ref 0–0)
PHOSPHATE SERPL-MCNC: 2.2 MG/DL (ref 2.5–4.9)
PLATELET # BLD AUTO: 258 X10*3/UL (ref 150–450)
POTASSIUM SERPL-SCNC: 3.7 MMOL/L (ref 3.5–5.3)
RBC # BLD AUTO: 3.69 X10*6/UL (ref 4–5.2)
SODIUM SERPL-SCNC: 138 MMOL/L (ref 136–145)
WBC # BLD AUTO: 12.8 X10*3/UL (ref 4.4–11.3)

## 2024-07-20 PROCEDURE — 97110 THERAPEUTIC EXERCISES: CPT | Mod: GP

## 2024-07-20 PROCEDURE — 2060000001 HC INTERMEDIATE ICU ROOM DAILY

## 2024-07-20 PROCEDURE — 97116 GAIT TRAINING THERAPY: CPT | Mod: GP

## 2024-07-20 PROCEDURE — 2500000004 HC RX 250 GENERAL PHARMACY W/ HCPCS (ALT 636 FOR OP/ED): Performed by: STUDENT IN AN ORGANIZED HEALTH CARE EDUCATION/TRAINING PROGRAM

## 2024-07-20 PROCEDURE — 82947 ASSAY GLUCOSE BLOOD QUANT: CPT

## 2024-07-20 PROCEDURE — 36415 COLL VENOUS BLD VENIPUNCTURE: CPT | Performed by: STUDENT IN AN ORGANIZED HEALTH CARE EDUCATION/TRAINING PROGRAM

## 2024-07-20 PROCEDURE — 83735 ASSAY OF MAGNESIUM: CPT | Performed by: STUDENT IN AN ORGANIZED HEALTH CARE EDUCATION/TRAINING PROGRAM

## 2024-07-20 PROCEDURE — 85025 COMPLETE CBC W/AUTO DIFF WBC: CPT | Performed by: STUDENT IN AN ORGANIZED HEALTH CARE EDUCATION/TRAINING PROGRAM

## 2024-07-20 PROCEDURE — 74018 RADEX ABDOMEN 1 VIEW: CPT

## 2024-07-20 PROCEDURE — 82330 ASSAY OF CALCIUM: CPT | Performed by: STUDENT IN AN ORGANIZED HEALTH CARE EDUCATION/TRAINING PROGRAM

## 2024-07-20 PROCEDURE — 2500000004 HC RX 250 GENERAL PHARMACY W/ HCPCS (ALT 636 FOR OP/ED)

## 2024-07-20 PROCEDURE — 92610 EVALUATE SWALLOWING FUNCTION: CPT | Mod: GN

## 2024-07-20 PROCEDURE — 80069 RENAL FUNCTION PANEL: CPT | Performed by: STUDENT IN AN ORGANIZED HEALTH CARE EDUCATION/TRAINING PROGRAM

## 2024-07-20 PROCEDURE — 74018 RADEX ABDOMEN 1 VIEW: CPT | Performed by: RADIOLOGY

## 2024-07-20 PROCEDURE — 97530 THERAPEUTIC ACTIVITIES: CPT | Mod: GP

## 2024-07-20 PROCEDURE — 94640 AIRWAY INHALATION TREATMENT: CPT

## 2024-07-20 PROCEDURE — 2500000001 HC RX 250 WO HCPCS SELF ADMINISTERED DRUGS (ALT 637 FOR MEDICARE OP): Performed by: STUDENT IN AN ORGANIZED HEALTH CARE EDUCATION/TRAINING PROGRAM

## 2024-07-20 RX ORDER — HYDROXYZINE HYDROCHLORIDE 25 MG/1
25 TABLET, FILM COATED ORAL ONCE
Status: DISCONTINUED | OUTPATIENT
Start: 2024-07-20 | End: 2024-07-20

## 2024-07-20 ASSESSMENT — COGNITIVE AND FUNCTIONAL STATUS - GENERAL
TURNING FROM BACK TO SIDE WHILE IN FLAT BAD: A LITTLE
WALKING IN HOSPITAL ROOM: A LITTLE
STANDING UP FROM CHAIR USING ARMS: A LITTLE
MOVING TO AND FROM BED TO CHAIR: A LITTLE
CLIMB 3 TO 5 STEPS WITH RAILING: TOTAL
MOBILITY SCORE: 16
MOVING FROM LYING ON BACK TO SITTING ON SIDE OF FLAT BED WITH BEDRAILS: A LITTLE

## 2024-07-20 ASSESSMENT — PAIN - FUNCTIONAL ASSESSMENT

## 2024-07-20 ASSESSMENT — PAIN SCALES - GENERAL
PAINLEVEL_OUTOF10: 8
PAINLEVEL_OUTOF10: 4
PAINLEVEL_OUTOF10: 6
PAINLEVEL_OUTOF10: 5 - MODERATE PAIN
PAINLEVEL_OUTOF10: 5 - MODERATE PAIN
PAINLEVEL_OUTOF10: 7
PAINLEVEL_OUTOF10: 5 - MODERATE PAIN
PAINLEVEL_OUTOF10: 3
PAINLEVEL_OUTOF10: 5 - MODERATE PAIN
PAINLEVEL_OUTOF10: 8
PAINLEVEL_OUTOF10: 5 - MODERATE PAIN
PAINLEVEL_OUTOF10: 4
PAINLEVEL_OUTOF10: 8
PAINLEVEL_OUTOF10: 7
PAINLEVEL_OUTOF10: 5 - MODERATE PAIN

## 2024-07-20 ASSESSMENT — PAIN DESCRIPTION - DESCRIPTORS
DESCRIPTORS: ACHING;BURNING
DESCRIPTORS: ACHING
DESCRIPTORS: ACHING;BURNING

## 2024-07-20 ASSESSMENT — PAIN DESCRIPTION - LOCATION
LOCATION: NECK
LOCATION: NECK

## 2024-07-20 ASSESSMENT — PAIN DESCRIPTION - ORIENTATION
ORIENTATION: MID
ORIENTATION: MID

## 2024-07-20 NOTE — PROGRESS NOTES
Speech-Language Pathology  Therapy Communication Note  Patient Name: Sweta Lim  MRN: [id number]  Today's Date: 7/20/2024   Time: 1020    Discipline: Speech-Language Pathology    Reason: Attempt    Comment:  Clinical swallow evaluation attempted; pt working w/ PT at this time. Will re-attempt as pt available/appropriate and schedule permits.

## 2024-07-20 NOTE — CARE PLAN
Problem: Fall/Injury  Goal: Not fall by end of shift  Outcome: Progressing  Goal: Be free from injury by end of the shift  Outcome: Progressing  Goal: Verbalize understanding of personal risk factors for fall in the hospital  Outcome: Progressing  Goal: Verbalize understanding of risk factor reduction measures to prevent injury from fall in the home  Outcome: Progressing  Goal: Use assistive devices by end of the shift  Outcome: Progressing  Goal: Pace activities to prevent fatigue by end of the shift  Outcome: Progressing

## 2024-07-20 NOTE — ANESTHESIA POSTPROCEDURE EVALUATION
Patient: Sweta Lim    Procedure Summary       Date: 07/19/24 Room / Location: [facility] OR 23 / [facility]    Anesthesia Start: 2054 Anesthesia Stop: 2230    Procedure: Exploration and revision of neck hematoma (Bilateral) Diagnosis:       Hematoma of neck, sequela      (Hematoma of neck, sequela [S10.93XS])    Surgeons: Nick Crump MD PhD Responsible Provider: Brenda Rodrigues MD    Anesthesia Type: general ASA Status: 4 - Emergent            Anesthesia Type: general    Vitals Value Taken Time   /83 07/19/24 2227   Temp 36 07/19/24 2230   Pulse 87 07/19/24 2229   Resp 17 07/19/24 2229   SpO2 100 % 07/19/24 2229   Vitals shown include unfiled device data.    Anesthesia Post Evaluation    Patient location during evaluation: floor  Patient participation: complete - patient participated  Level of consciousness: awake and alert  Pain score: 0  Pain management: adequate  Multimodal analgesia pain management approach  Airway patency: patent  Two or more strategies used to mitigate risk of obstructive sleep apnea  Cardiovascular status: stable and acceptable  Respiratory status: acceptable, face mask and nonlabored ventilation  Hydration status: euvolemic  Postoperative Nausea and Vomiting: none        No notable events documented.

## 2024-07-20 NOTE — CONSULTS
"Nutrition Initial Assessment:   Nutrition Assessment    Reason for Assessment: Provider consult order    Patient is a 68 y.o. female with h/o HTN, HLD, COPD, DEBRA, asthma, hypothyroidism, DM2, GERD, depression, anxiety, previous C5-6 ACDF, p/w neck pain and myelopathy, found to have L C4-5 paracentral disc, s/p C4-5 ACDF. Stay c/b increased neck swelling concerning for hematoma--now is s/p right nect exploration and evacuation of hematoma on 7/19. S/p bedside swallow eval on 7/20 recommending pt be NPO.    Nutrition History:  Energy Intake: Poor < 50 %  Food and Nutrient History: RDN met with pt who reports she has lost a significant amount of weight (~27 kg) over the last 1.5 years since the passing of her mother. States that most days she has 2 Premier Protain Shakes per day (4g CHO, 30g protein, 160 kcal) and then will try to eat something in between like a turkey sandwich but admits that she really does not eat. Pt mentioned her diabetes several times during visit-- will be concious and provide a diabetic friendly formula.  Food Allergies/Intolerances:  None  GI Symptoms: None  Oral Problems: Swallowing difficulty       Anthropometrics:  Height: 170.2 cm (5' 7\")   Weight: 76.5 kg (168 lb 10.4 oz)   BMI (Calculated): 26.41  IBW/kg (Dietitian Calculated): 61.4 kg    Weight History:   Wt Readings from Last 15 Encounters:   07/20/24 76.5 kg (168 lb 10.4 oz)   07/09/24 75.7 kg (166 lb 12.8 oz)   07/01/24 75.3 kg (166 lb 0.1 oz)   05/28/24 77.1 kg (170 lb)   05/16/24 75.3 kg (166 lb)   03/11/24 77.1 kg (170 lb)   02/07/24 72.7 kg (160 lb 4.8 oz)   02/05/24 72.6 kg (160 lb)   11/30/23 71.5 kg (157 lb 10.1 oz)   11/27/23 71.6 kg (157 lb 12.8 oz)   11/03/23 72.9 kg (160 lb 12.8 oz)   10/16/23 74.4 kg (164 lb)   09/19/23 73 kg (161 lb)   08/07/23 73.9 kg (163 lb)   08/04/23 72.6 kg (160 lb)     Weight Change %: wt stable between 72-77 kg over the last year       Nutrition Focused Physical Exam Findings:    Subcutaneous Fat " Loss:   Orbital Fat Pads: Mild-Moderate (slight dark circles and slight hollowing)  Buccal Fat Pads: Mild-Moderate (flat cheeks, minimal bounce)  Triceps: Mild-Moderate (less than ample fat tissue)  Muscle Wasting:  Temporalis: Mild-Moderate (slight depression)  Pectoralis (Clavicular Region): Mild-Moderate (some protrusion of clavicle)  Deltoid/Trapezius: Mild-Moderate (slight protrusion of acromion process)  Interosseous: Mild-Moderate (slightly depressed area between thumb and forefinger)  Edema:  Edema: none  Physical Findings:  Hair: Positive (wirey)  Skin: Positive (neck and throat incisions)    Nutrition Significant Labs:  BG POCT trend:   Results from last 7 days   Lab Units 07/20/24  1203 07/20/24  0755 07/19/24  1943 07/19/24  1607 07/19/24  1200   POCT GLUCOSE mg/dL 150* 144* 106* 118* 123*    , Renal Lab Trend:   Results from last 7 days   Lab Units 07/20/24  0341 07/19/24  0655 07/19/24  0445 07/18/24  0725 07/18/24  0725   POTASSIUM mmol/L 3.7 3.9 4.4  --  4.8   PHOSPHORUS mg/dL 2.2* 2.5 3.1   < >  --    SODIUM mmol/L 138 141 140  --  137   MAGNESIUM mg/dL 2.07  --  1.87   < >  --    EGFR mL/min/1.73m*2 >90 >90 >90  --  >90   BUN mg/dL 10 10 12  --  18   CREATININE mg/dL 0.40* 0.33* 0.41*  --  0.46*    < > = values in this interval not displayed.        Nutrition Specific Medications:  Scheduled medications  dexAMETHasone, 2 mg, intravenous, q8h  folic acid, 1 mg, oral, Daily  insulin degludec, 15 Units, subcutaneous, Nightly  insulin lispro, 0-10 Units, subcutaneous, TID  levothyroxine, 75 mcg, oral, Daily  pantoprazole, 40 mg, oral, Daily before breakfast  polyethylene glycol, 17 g, oral, Daily  sennosides-docusate sodium, 2 tablet, oral, BID    I/O:   Last BM Date:  (pta);      Dietary Orders (From admission, onward)       Start     Ordered    07/19/24 0644  NPO Diet; Effective now  Diet effective now         07/19/24 0643    07/17/24 2264  Oral supplements - Bharath  Until discontinued         Comments: Twice a day.   Question Answer Comment   Deliver with Breakfast    Deliver with Lunch    Select supplement: Bharath        07/17/24 6668                     Estimated Needs:   Total Energy Estimated Needs (kCal):  (~1900)  Method for Estimating Needs: ~25 kcal/kg actual wt  Total Protein Estimated Needs (g):  ()  Method for Estimating Needs: 1.2-1.4 g/kg actual wt  Total Fluid Estimated Needs (mL):  (per NSU)  Method for Estimating Needs: 1 ml/kcal or per NSU        Nutrition Diagnosis   Malnutrition Diagnosis  Patient has Malnutrition Diagnosis: Yes  Diagnosis Status: New  Malnutrition Diagnosis: Moderate malnutrition related to starvation  As Evidenced by: mild to moderate muscle loss, subcutaneous fat loss, energy intake meeting <75% estimated energy needs for >3 months.    Nutrition Diagnosis  Patient has Nutrition Diagnosis: Yes  Diagnosis Status (1): New  Nutrition Diagnosis 1: Swallowing difficulity  Related to (1): dysphagia  As Evidenced by (1): SLP bedside swallow eval 7/20.       Nutrition Interventions/Recommendations         Nutrition Prescription:  Individualized Nutrition Prescription Provided for : enteral nutrition support        Nutrition Interventions:   Interventions: Enteral intake, Vitamin supplement therapy  Enteral Intake: Insert enteral feeding tube, Modify composition of enteral nutrition, Modify rate of enteral nutrition  Goal: Start Glucerna 1.5 @ 15 ml/hr and increase by 10 ml q12h until goal rate of 55 ml/hr x22 hr is reached. Free H20 flush per team. Provides: 1815 kcal, 100 g protein, 918 ml free H20  Hold for 1 hr before/after synthroid admin  Vitamin Supplement Therapy: Thiamin  Goal: 100 mg IV x5-7 days  Additional Interventions: Monitor for s/s refeeding syndrome-- RFP + mag BID, replete as needed      Nutrition Monitoring and Evaluation   Food/Nutrient Related History Monitoring  Monitoring and Evaluation Plan: Energy intake  Energy Intake: Estimated energy  intake  Criteria: Meet >75% EEN        Biochemical Data, Medical Tests and Procedures  Monitoring and Evaluation Plan: Electrolyte/renal panel, Glucose/endocrine profile  Criteria: lytes WNL  Criteria: POC glucose <180 mg/dl        Time Spent (min): 45 minutes

## 2024-07-20 NOTE — ANESTHESIA PREPROCEDURE EVALUATION
Patient: Sweta Lim    Procedure Information       Date/Time: 07/19/24 2030    Procedure: Exploration and revision of neck hematoma (Bilateral)    Location: [facility] OR 23 / [facility]    Surgeons: Nick Crump MD PhD            Relevant Problems   Cardiac   (+) Aortic valve disease   (+) Benign essential hypertension   (+) Heart murmur   (+) Mixed hyperlipidemia      Pulmonary   (+) Asthma (HHS-HCC)   (+) Obstructive sleep apnea syndrome      Neuro   (+) Atherosclerosis of both carotid arteries   (+) Cervical radiculopathy   (+) Diabetic polyneuropathy associated with type 2 diabetes mellitus (Multi)   (+) Generalized anxiety disorder   (+) Moderate episode of recurrent major depressive disorder (Multi)   (+) Situational depression   (+) Stress reaction      GI   (+) Chronic diarrhea   (+) Dysphagia      Endocrine   (+) DM (diabetes mellitus), type 2 (Multi)   (+) Diabetic polyneuropathy associated with type 2 diabetes mellitus (Multi)      Musculoskeletal   (+) Cervical spondylosis with myelopathy   (+) Cervical stenosis of spine   (+) Degenerative cervical spinal stenosis   (+) Rheumatoid arthritis (Multi)   (+) Spinal stenosis of cervical region   (+) Spondylosis of thoracic spine   (+) Spondylosis, cervical, with myelopathy      Skin   (+) Dyshidrotic eczema       Clinical information reviewed:   Tobacco  Allergies  Meds   Med Hx  Surg Hx  OB Status  Fam Hx  Soc   Hx        NPO Detail:  No data recorded     Physical Exam    Airway  Mallampati: II  TM distance: >3 FB  Neck ROM: limited     Cardiovascular   Rhythm: regular  Rate: normal     Dental - normal exam     Pulmonary   Breath sounds clear to auscultation     Abdominal            Anesthesia Plan    History of general anesthesia?: yes  History of complications of general anesthesia?: no    ASA 4 - emergent     general     The patient is not a current smoker.    intravenous induction   Postoperative administration of opioids is  intended.  Trial extubation is planned.  Anesthetic plan and risks discussed with patient.    Plan discussed with resident.

## 2024-07-20 NOTE — PROGRESS NOTES
"Sweta Lim is a 68 y.o. female on day 1 of admission presenting with Degenerative cervical spinal stenosis.    Subjective   Tolerated the procedure well. Neck soft and speaking more clearly. She is very happy with the result of the operation.        Objective     Physical Exam  A&Ox3  Face symmetric  Neck hematoma resolved  BUE/LE 5/5  Incision c/d/I    Last Recorded Vitals  Blood pressure 160/72, pulse 72, temperature 36.3 °C (97.3 °F), temperature source Temporal, resp. rate 19, height 1.702 m (5' 7\"), weight 76.5 kg (168 lb 10.4 oz), SpO2 95%.  Intake/Output last 3 Shifts:  I/O last 3 completed shifts:  In: 600 (7.8 mL/kg) [I.V.:600 (7.8 mL/kg)]  Out: 1040 (13.6 mL/kg) [Urine:1010 (0.4 mL/kg/hr); Blood:30]  Weight: 76.5 kg     Relevant Results                             Assessment/Plan   Principal Problem:    Degenerative cervical spinal stenosis  Active Problems:    Cervical myelopathy (Multi)    Spondylosis, cervical, with myelopathy    Hematoma of neck    68yF h/o HTN, HLD, COPD, DEBRA, asthma, hypothyroidism, DM2, GERD, depression, anxiety, previous C5-6 ACDF, p/w neck pain and myelopathy, found to have L C4-5 paracentral disc, s/p C4-5 ACDF     7/19 Patient with increased neck swelling concerning for hematoma overnight, on exam patient with hoarse voice and difficulty swallowing, however difficulty with airway, breathing comfortably on room air      Lateral neck xray demonstrated sig incr neck swelling compared to prior c spine xrays    7/19 s/p right neck exploration and evacuation of hematoma      Plan  NAVEED  Continue to hold ASA until follow up  Arrange 2 week  follow up for wound check  PTOT-SNF           Erendira Nino MD      "

## 2024-07-20 NOTE — PROGRESS NOTES
Speech-Language Pathology  Adult Inpatient Clinical Bedside Swallow Evaluation    Patient Name: Sweta Lim  MRN: [id number]  Today's Date: 7/20/2024   Start Time: 1150  Stop Time: 1215  Time Calculation (min): 25    History of Present Illness:   The patient is a 68 year old female with  chronic progressive neck pain and balance/gait dysfunction. She has a history of a prior C5-6 ACDF. Her past imaging has shown a large left paracentric disc bulge at C4-5 resulting in severe central stenosis. She also had a right paracentric disc bulge at C6-7 resulting in severe right foraminal stenosis. She has failed medical management and wishes to proceed with surgical intervention. She presents today for perioperative evaluation in anticipation of C4-5 Anterior cervical disectomy fusion on 7/17/24 with Dr. Crump.     Assessment:   Clinical bedside swallow evaluation completed. Pt cleared for evaluation by RN. Received awake/alert and upright in bed for session. A&Ox4. Unremarkable oral mechanism examination. Reports h/o dysphagia prior to admission characterized by globus sensation and coughing w/ PO; now exacerbated, per pt. Wet/gurgly upper airway sounds and vocal quality prior to administration of PO trials; suspect r/t difficulty managing oropharyngeal secretions. Pt intermittently independently utilized oral suctioning to clear secretions. Significantly weak/hoarse vocal quality; not at baseline, per pt. Weak volitional cough prior to administration of PO trials.     Trials of ice chip x1 and tsp sip water x1 were given. Normal oral management of both trials. Significant overt clinical s/s aspiration w/ both trials characterized by increased wet/gurgly upper airway sounds and vocal quality, immediate wet coughing, SOB, and requiring oral suctioning to clear. Pt reported significant discomfort w/ swallowing. No further trials given 2/2 c/f aspiration.     Pt not appropriate for initiation of PO diet at this time.  Recommend NPO w/ frequent aggressive oral care. 1-2 ice chips/hr allowed w/ nursing for pleasure, safe swallow stimulation, and after oral care to prevent buildup of bacteria within the oropharynx. Pt will likely require MBSS to instrumentally evaluate swallow function/determine least restrictive diet; however, currently contraindicated given severity of swallow function. Question need for alternate means nutrition/hydration. Additionally question need for ENT consult given significant hoarse vocal quality. SLP will continue to follow w/ swallow re-assessment as pt appropriate/schedule permits. RN/MD aware.      Recommendations:  NPO with frequent aggressive oral care.  1-2 ice chips/hr allowed w/ nursing for pleasure, safe swallow stimulation, and after oral care to prevent buildup of bacteria within the oropharynx  Consider alternate means nutrition/hydration  Question need for ENT consult given significant hoarse vocal quality  SLP will continue to follow w/ swallow re-assessment as pt appropriate/schedule permits    Goal:   Pt will tolerate least restrictive diet with no overt clinical s/s aspiration 100% of the time.        Plan:  SLP Services Indicated: Yes  Frequency: 2x week  Discussed POC with patient  SLP - OK to Discharge    Pain:   0-10  0 = No pain.     Inpatient Education:  Extensive education provided to patient regarding current swallow function, recommendations/results, and POC.      Consultations/Referrals/Coordination of Services:   ENT

## 2024-07-20 NOTE — BRIEF OP NOTE
Date: 2024  OR Location: [facility] OR    Name: Sweta Lim : 1955, Age: 68 y.o., MRN: [id number], Sex: female    Diagnosis  Pre-op Diagnosis      * Hematoma of neck, sequela [S10.93XS] Post-op Diagnosis     * Hematoma of neck, sequela [S10.93XS]     Procedures  Exploration and revision of neck hematoma   -  ALLOGRAFT FOR SPINE SURGERY ONLY MORSELIZED      Surgeons      * Nick Crump - Primary    Resident/Fellow/Other Assistant:  Surgeons and Role:  * No surgeons found with a matching role *    Procedure Summary  Anesthesia: General  ASA: IV  Anesthesia Staff: Anesthesiologist: Brenda Rodrigues MD  Anesthesia Resident: Jazzy Ingacio DO  Estimated Blood Loss: 30mL  Intra-op Medications:   Administrations occurring from 24 to 24 0100:   Medication Name Total Dose   acetaminophen (Tylenol) tablet 650 mg Cannot be calculated   benzocaine-menthol (Cepastat Sore Throat) lozenge 1 lozenge Cannot be calculated   busPIRone (Buspar) tablet 10 mg Cannot be calculated   busPIRone (Buspar) tablet 20 mg Cannot be calculated   clonazePAM (KlonoPIN) tablet 0.5 mg Cannot be calculated   cyclobenzaprine (Flexeril) tablet 10 mg Cannot be calculated   dexAMETHasone (Decadron) injection 2 mg Cannot be calculated   dextrose 50 % injection 12.5 g Cannot be calculated   dextrose 50 % injection 25 g Cannot be calculated   escitalopram (Lexapro) tablet 20 mg Cannot be calculated   ezetimibe (Zetia) tablet 10 mg Cannot be calculated   folic acid (Folvite) tablet 1 mg Cannot be calculated   gabapentin (Neurontin) capsule 300 mg Cannot be calculated   glucagon (Glucagen) injection 1 mg Cannot be calculated   glucagon (Glucagen) injection 1 mg Cannot be calculated   heparin (porcine) injection 5,000 Units Cannot be calculated   HYDROmorphone (Dilaudid) injection 0.5 mg Cannot be calculated   insulin degludec (Tresiba) injection 15 Units Cannot be calculated   insulin lispro (HumaLOG) injection  0-10 Units Cannot be calculated   levothyroxine (Synthroid, Levoxyl) tablet 75 mcg Cannot be calculated   naloxone (Narcan) injection 0.2 mg Cannot be calculated   nicotine (Nicoderm CQ) 21 mg/24 hr patch 1 patch Cannot be calculated   oxyCODONE (Roxicodone) immediate release tablet 10 mg Cannot be calculated   oxyCODONE (Roxicodone) immediate release tablet 5 mg Cannot be calculated   pantoprazole (ProtoNix) EC tablet 40 mg Cannot be calculated   polyethylene glycol (Glycolax, Miralax) packet 17 g Cannot be calculated   primidone (Mysoline) tablet 100 mg Cannot be calculated   sennosides-docusate sodium (Kendal-Colace) 8.6-50 mg per tablet 2 tablet Cannot be calculated   simvastatin (Zocor) tablet 40 mg Cannot be calculated   traZODone (Desyrel) tablet 50 mg Cannot be calculated              Anesthesia Record               Intraprocedure I/O Totals          Intake    Magnesium Sulfate 0.00 mL    The total shown is the total volume documented since Anesthesia Start was filed.    LR infusion 600.00 mL    Total Intake 600 mL       Output    Est. Blood Loss 30 mL    Total Output 30 mL       Net    Net Volume 570 mL          Specimen: No specimens collected     Staff:   Circulator: Saira  Scrub Person: Juanjose  Scrub Person: Marta          Findings: venous oozing    Complications:  None; patient tolerated the procedure well.     Disposition: ICU - extubated and stable.  Condition: stable  Specimens Collected: No specimens collected  Attending Attestation:     Nick Crump  Phone Number: [phone]

## 2024-07-20 NOTE — PROGRESS NOTES
Physical Therapy    Physical Therapy Treatment    Patient Name: Sweta Lim  MRN: [id number]  Today's Date: 7/20/2024  Time Calculation  Start Time: 0959  Stop Time: 1040  Time Calculation (min): 41 min    Assessment/Plan   PT Assessment  Evaluation/Treatment Tolerance: Patient tolerated treatment well  Medical Staff Made Aware: Yes  End of Session Communication: Bedside nurse  End of Session Patient Position: Bed, 3 rail up, Alarm on     PT Plan  Treatment/Interventions: Bed mobility, Transfer training, Gait training, Stair training, Strengthening, Endurance training, Therapeutic exercise, Therapeutic activity  PT Plan: Ongoing PT  PT Frequency: Daily  PT Discharge Recommendations: Moderate intensity level of continued care  PT Recommended Transfer Status: Assist x1, Assistive device  PT - OK to Discharge: Yes      General Visit Information:   PT  Visit  PT Received On: 07/20/24  Response to Previous Treatment: Patient with no complaints from previous session.  General  Missed Visit: No  Missed Visit Reason:  (--)  Family/Caregiver Present: No  Prior to Session Communication: Bedside nurse (Pt NAVEED status)  Patient Position Received: Bed, 3 rail up, Alarm off, not on at start of session  General Comment: Supine.  Pt extremely pleasant, cooperative and agreeable to PT.  Eager to mobilize.  Pt able to transfer OOB to chair, and also able to ambulate further, 2x in the room.  Vitals stable.  Tolerated well.  Very grateful for PT visit.    Subjective   Precautions:  Precautions  Hearing/Visual Limitations: [place], B Hearing Aids  Medical Precautions: Fall precautions, Spinal precautions  Vital Signs:  Vital Signs  Heart Rate: 81  BP: (!) 151/94 (In chair 148/74.  End of session: 156/73)  MAP (mmHg): 94  Patient Position: Lying    Objective   Pain:  Pain Assessment  Pain Assessment: 0-10  0-10 (Numeric) Pain Score: 5 - Moderate pain (pt reporting manageable for mobility)  Cognition:  Cognition  Overall Cognitive Status:  Within Functional Limits  Orientation Level: Oriented X4  Following Commands: Follows one step commands with repetition    Activity Tolerance:  Activity Tolerance  Endurance: Tolerates 30 min exercise with multiple rests  Treatments:  Therapeutic Exercise  Therapeutic Exercise Activity 1: Supine: AP 2x10, HS, SAQ x10.  Sitting: LAQ, hip flx 2x10    Bed Mobility 1  Bed Mobility 1: Supine to sitting, Sitting to supine  Level of Assistance 1: Minimum assistance, Minimal tactile cues, Moderate verbal cues    Ambulation/Gait Training 1  Surface 1: Level tile  Device 1: Rolling walker  Assistance 1: Minimum assistance, Moderate verbal cues, Minimal tactile cues  Quality of Gait 1: Narrow base of support, Inconsistent stride length, Decreased step length, Shuffling gait, Soft knee(s), Forward flexed posture (R foot drop, but pt reports improved since surgery)  Comments/Distance (ft) 1: bed>chair, 6 short steps, 2x 25 feet with seated breaks  Transfer 1  Transfer From 1: Sit to, Stand to  Transfer to 1: Sit  Transfer Device 1: Walker  Transfer Level of Assistance 1: Minimum assistance, Minimal verbal cues, Minimal tactile cues    Outcome Measures:    [facility] Basic Mobility  Turning from your back to your side while in a flat bed without using bedrails: A little  Moving from lying on your back to sitting on the side of a flat bed without using bedrails: A little  Moving to and from bed to chair (including a wheelchair): A little  Standing up from a chair using your arms (e.g. wheelchair or bedside chair): A little  To walk in hospital room: A little  Climbing 3-5 steps with railing: Total  Basic Mobility - Total Score: 16    FSS-ICU   Ambulation 1   Rolling 4   Sitting 5   Transfer Sit-to-Stand 4   Transfer Supine-to-Sit 4   Total Score 18     Education Documentation  Precautions, taught by Lawrence Browning, PT at 7/20/2024 11:35 AM.  Learner: Patient  Readiness: Eager  Method: Explanation  Response: Verbalizes  Understanding    Body Mechanics, taught by Lawrence Browning, PT at 7/20/2024 11:35 AM.  Learner: Patient  Readiness: Eager  Method: Explanation  Response: Verbalizes Understanding    Mobility Training, taught by Lawrence Browning, PT at 7/20/2024 11:35 AM.  Learner: Patient  Readiness: Eager  Method: Explanation  Response: Verbalizes Understanding    Education Comments  No comments found.        OP EDUCATION:       Encounter Problems       Encounter Problems (Active)       Balance       Pt will score >/=24/28 on Tinetti to demonstrate decreased falls risk (Progressing)       Start:  07/18/24    Expected End:  08/01/24               Mobility       Pt will be SBA to ascend/descend 4 steps with LRAD and 7 steps with 1 handrail (Progressing)       Start:  07/18/24    Expected End:  08/01/24            Pt will be SBA ambulation 100 ft with RW (Progressing)       Start:  07/18/24    Expected End:  08/01/24               PT Transfers       Pt will be SBA for sit to stand and bed to chair transfers with RW (Progressing)       Start:  07/18/24    Expected End:  08/01/24            Pt will be [place] with bed mobility (Progressing)       Start:  07/18/24    Expected End:  08/01/24

## 2024-07-20 NOTE — ANESTHESIA PROCEDURE NOTES
Airway  Date/Time: 7/19/2024 9:11 PM  Urgency: elective    Difficult airway (Anterior circumferential neck hematoma)    Staffing  Performed: resident   Authorized by: Brenda Rodrigues MD    Performed by: Jazzy Ignacio DO  Patient location during procedure: OR    Indications and Patient Condition  Indications for airway management: anesthesia and airway protection  Spontaneous Ventilation: absent  Sedation level: deep  Preoxygenated: yes  Patient position: sniffing  MILS maintained throughout  Mask difficulty assessment: 0 - not attempted  Planned trial extubation    Final Airway Details  Final airway type: endotracheal airway      Successful airway: ETT     Successful intubation technique: video laryngoscopy  Endotracheal tube insertion site: oral  Blade: Deni  Blade size: #3  ETT size (mm): 6.5  Cormack-Lehane Classification: grade IIa - partial view of glottis  Placement verified by: chest auscultation and capnometry   Measured from: lips  ETT to lips (cm): 23  Number of attempts at approach: 1

## 2024-07-21 ENCOUNTER — APPOINTMENT (OUTPATIENT)
Dept: RADIOLOGY | Facility: HOSPITAL | Age: 69
End: 2024-07-21
Payer: MEDICARE

## 2024-07-21 VITALS
RESPIRATION RATE: 21 BRPM | TEMPERATURE: 96.8 F | HEIGHT: 67 IN | BODY MASS INDEX: 26.47 KG/M2 | OXYGEN SATURATION: 94 % | WEIGHT: 168.65 LBS | HEART RATE: 59 BPM | SYSTOLIC BLOOD PRESSURE: 106 MMHG | DIASTOLIC BLOOD PRESSURE: 86 MMHG

## 2024-07-21 LAB
ALBUMIN SERPL BCP-MCNC: 3.7 G/DL (ref 3.4–5)
ANION GAP SERPL CALC-SCNC: 14 MMOL/L (ref 10–20)
BASOPHILS # BLD AUTO: 0.02 X10*3/UL (ref 0–0.1)
BASOPHILS NFR BLD AUTO: 0.2 %
BLOOD EXPIRATION DATE: NORMAL
BLOOD EXPIRATION DATE: NORMAL
BUN SERPL-MCNC: 13 MG/DL (ref 6–23)
CA-I BLD-SCNC: 1.18 MMOL/L (ref 1.1–1.33)
CALCIUM SERPL-MCNC: 9.3 MG/DL (ref 8.6–10.6)
CHLORIDE SERPL-SCNC: 101 MMOL/L (ref 98–107)
CO2 SERPL-SCNC: 30 MMOL/L (ref 21–32)
CREAT SERPL-MCNC: 0.4 MG/DL (ref 0.5–1.05)
DISPENSE STATUS: NORMAL
DISPENSE STATUS: NORMAL
EGFRCR SERPLBLD CKD-EPI 2021: >90 ML/MIN/1.73M*2
EOSINOPHIL # BLD AUTO: 0.01 X10*3/UL (ref 0–0.7)
EOSINOPHIL NFR BLD AUTO: 0.1 %
ERYTHROCYTE [DISTWIDTH] IN BLOOD BY AUTOMATED COUNT: 14.8 % (ref 11.5–14.5)
GLUCOSE BLD MANUAL STRIP-MCNC: 117 MG/DL (ref 74–99)
GLUCOSE BLD MANUAL STRIP-MCNC: 125 MG/DL (ref 74–99)
GLUCOSE BLD MANUAL STRIP-MCNC: 128 MG/DL (ref 74–99)
GLUCOSE BLD MANUAL STRIP-MCNC: 128 MG/DL (ref 74–99)
GLUCOSE BLD MANUAL STRIP-MCNC: 137 MG/DL (ref 74–99)
GLUCOSE SERPL-MCNC: 121 MG/DL (ref 74–99)
HCT VFR BLD AUTO: 36.3 % (ref 36–46)
HGB BLD-MCNC: 12.2 G/DL (ref 12–16)
IMM GRANULOCYTES # BLD AUTO: 0.02 X10*3/UL (ref 0–0.7)
IMM GRANULOCYTES NFR BLD AUTO: 0.2 % (ref 0–0.9)
LYMPHOCYTES # BLD AUTO: 1.85 X10*3/UL (ref 1.2–4.8)
LYMPHOCYTES NFR BLD AUTO: 16 %
MAGNESIUM SERPL-MCNC: 2.01 MG/DL (ref 1.6–2.4)
MCH RBC QN AUTO: 33.9 PG (ref 26–34)
MCHC RBC AUTO-ENTMCNC: 33.6 G/DL (ref 32–36)
MCV RBC AUTO: 101 FL (ref 80–100)
MONOCYTES # BLD AUTO: 1.22 X10*3/UL (ref 0.1–1)
MONOCYTES NFR BLD AUTO: 10.5 %
NEUTROPHILS # BLD AUTO: 8.47 X10*3/UL (ref 1.2–7.7)
NEUTROPHILS NFR BLD AUTO: 73 %
NRBC BLD-RTO: 0 /100 WBCS (ref 0–0)
PHOSPHATE SERPL-MCNC: 1.9 MG/DL (ref 2.5–4.9)
PLATELET # BLD AUTO: 281 X10*3/UL (ref 150–450)
POTASSIUM SERPL-SCNC: 3.5 MMOL/L (ref 3.5–5.3)
PRODUCT BLOOD TYPE: 5100
PRODUCT BLOOD TYPE: 5100
PRODUCT CODE: NORMAL
PRODUCT CODE: NORMAL
RBC # BLD AUTO: 3.6 X10*6/UL (ref 4–5.2)
SODIUM SERPL-SCNC: 141 MMOL/L (ref 136–145)
UNIT ABO: NORMAL
UNIT ABO: NORMAL
UNIT NUMBER: NORMAL
UNIT NUMBER: NORMAL
UNIT RH: NORMAL
UNIT RH: NORMAL
UNIT VOLUME: 281
UNIT VOLUME: 336
WBC # BLD AUTO: 11.6 X10*3/UL (ref 4.4–11.3)
XM INTEP: NORMAL
XM INTEP: NORMAL

## 2024-07-21 PROCEDURE — 94640 AIRWAY INHALATION TREATMENT: CPT

## 2024-07-21 PROCEDURE — 74018 RADEX ABDOMEN 1 VIEW: CPT

## 2024-07-21 PROCEDURE — 84100 ASSAY OF PHOSPHORUS: CPT | Performed by: STUDENT IN AN ORGANIZED HEALTH CARE EDUCATION/TRAINING PROGRAM

## 2024-07-21 PROCEDURE — 74018 RADEX ABDOMEN 1 VIEW: CPT | Performed by: RADIOLOGY

## 2024-07-21 PROCEDURE — 82330 ASSAY OF CALCIUM: CPT | Performed by: STUDENT IN AN ORGANIZED HEALTH CARE EDUCATION/TRAINING PROGRAM

## 2024-07-21 PROCEDURE — 2500000004 HC RX 250 GENERAL PHARMACY W/ HCPCS (ALT 636 FOR OP/ED): Performed by: STUDENT IN AN ORGANIZED HEALTH CARE EDUCATION/TRAINING PROGRAM

## 2024-07-21 PROCEDURE — 2500000004 HC RX 250 GENERAL PHARMACY W/ HCPCS (ALT 636 FOR OP/ED)

## 2024-07-21 PROCEDURE — 2500000001 HC RX 250 WO HCPCS SELF ADMINISTERED DRUGS (ALT 637 FOR MEDICARE OP): Performed by: STUDENT IN AN ORGANIZED HEALTH CARE EDUCATION/TRAINING PROGRAM

## 2024-07-21 PROCEDURE — 2500000002 HC RX 250 W HCPCS SELF ADMINISTERED DRUGS (ALT 637 FOR MEDICARE OP, ALT 636 FOR OP/ED): Performed by: STUDENT IN AN ORGANIZED HEALTH CARE EDUCATION/TRAINING PROGRAM

## 2024-07-21 PROCEDURE — 82947 ASSAY GLUCOSE BLOOD QUANT: CPT

## 2024-07-21 PROCEDURE — 83735 ASSAY OF MAGNESIUM: CPT | Performed by: STUDENT IN AN ORGANIZED HEALTH CARE EDUCATION/TRAINING PROGRAM

## 2024-07-21 PROCEDURE — 1100000001 HC PRIVATE ROOM DAILY

## 2024-07-21 PROCEDURE — 85025 COMPLETE CBC W/AUTO DIFF WBC: CPT | Performed by: STUDENT IN AN ORGANIZED HEALTH CARE EDUCATION/TRAINING PROGRAM

## 2024-07-21 PROCEDURE — 36415 COLL VENOUS BLD VENIPUNCTURE: CPT | Performed by: STUDENT IN AN ORGANIZED HEALTH CARE EDUCATION/TRAINING PROGRAM

## 2024-07-21 ASSESSMENT — PAIN - FUNCTIONAL ASSESSMENT
PAIN_FUNCTIONAL_ASSESSMENT: 0-10

## 2024-07-21 ASSESSMENT — PAIN SCALES - GENERAL
PAINLEVEL_OUTOF10: 4
PAINLEVEL_OUTOF10: 6
PAINLEVEL_OUTOF10: 5 - MODERATE PAIN
PAINLEVEL_OUTOF10: 6
PAINLEVEL_OUTOF10: 4
PAINLEVEL_OUTOF10: 7
PAINLEVEL_OUTOF10: 4
PAINLEVEL_OUTOF10: 5 - MODERATE PAIN
PAINLEVEL_OUTOF10: 5 - MODERATE PAIN
PAINLEVEL_OUTOF10: 7
PAINLEVEL_OUTOF10: 5 - MODERATE PAIN
PAINLEVEL_OUTOF10: 4
PAINLEVEL_OUTOF10: 6
PAINLEVEL_OUTOF10: 7

## 2024-07-21 ASSESSMENT — PAIN DESCRIPTION - DESCRIPTORS
DESCRIPTORS: ACHING;BURNING

## 2024-07-21 ASSESSMENT — PAIN DESCRIPTION - LOCATION: LOCATION: NECK

## 2024-07-21 NOTE — PROGRESS NOTES
"Sweta Lim is a 68 y.o. female on day 2 of admission presenting with Degenerative cervical spinal stenosis.    Subjective   Tolerated cortrak placement    Objective     Physical Exam  A&Ox3  Hypophonic   Face symmetric  Neck hematoma resolved  RUE D5 B5 T5 HG/IO 5  RLE HF5 KE5 DF4 PF 5  LUE D5 B5 T5 HG/IO 5  LLE HF5 KE5 DF/PF 5  Incision c/d/I  Cortrak in place    Last Recorded Vitals  Blood pressure 146/70, pulse 70, temperature 35.6 °C (96.1 °F), temperature source Temporal, resp. rate 16, height 1.702 m (5' 7\"), weight 76.5 kg (168 lb 10.4 oz), SpO2 100%.  Intake/Output last 3 Shifts:  I/O last 3 completed shifts:  In: 600 (7.8 mL/kg) [I.V.:600 (7.8 mL/kg)]  Out: 1090 (14.2 mL/kg) [Urine:1060 (0.4 mL/kg/hr); Blood:30]  Weight: 76.5 kg     Relevant Results                      Assessment/Plan   Principal Problem:    Degenerative cervical spinal stenosis  Active Problems:    Cervical myelopathy (Multi)    Spondylosis, cervical, with myelopathy    Hematoma of neck    68yF h/o HTN, HLD, COPD, DEBRA, asthma, hypothyroidism, DM2, GERD, depression, anxiety, previous C5-6 ACDF, p/w neck pain and myelopathy, found to have L C4-5 paracentral disc, s/p C4-5 ACDF     7/19 Patient with increased neck swelling concerning for hematoma overnight, on exam patient with hoarse voice and difficulty swallowing, however difficulty with airway, breathing comfortably on room air      Lateral neck xray demonstrated sig incr neck swelling compared to prior c spine xrays    7/19 s/p right neck exploration and evacuation of hematoma      Plan  Floor  SLP recs - NPO, will attempt MBS  Nutrition recs  Continue to hold ASA, ok to restart POD14  Arrange 2 week  follow up for wound check  PTOT-SNF           Jose Yuan MD      "

## 2024-07-21 NOTE — CARE PLAN
Problem: Fall/Injury  Goal: Not fall by end of shift  Outcome: Progressing  Goal: Be free from injury by end of the shift  Outcome: Progressing  Goal: Verbalize understanding of personal risk factors for fall in the hospital  Outcome: Progressing  Goal: Verbalize understanding of risk factor reduction measures to prevent injury from fall in the home  Outcome: Progressing  Goal: Use assistive devices by end of the shift  Outcome: Progressing  Goal: Pace activities to prevent fatigue by end of the shift  Outcome: Progressing   The patient's goals for the shift include   Problem: Skin  Goal: Participates in plan/prevention/treatment measures  Outcome: Progressing  Goal: Prevent/manage excess moisture  Outcome: Progressing  Goal: Prevent/minimize sheer/friction injuries  Outcome: Progressing  Goal: Promote/optimize nutrition  Outcome: Progressing  Goal: Promote skin healing  Outcome: Progressing        The clinical goals for the shift include Patient will remain free from falls and injuries throughout shift

## 2024-07-21 NOTE — POST-PROCEDURE NOTE
Small bore feeding tube placement    12 FR small bore feeding tube placed in right nares times 1 attempt using IRIS video technology.  Tube secured with tapie @ 85 cm.  Pt refused bridle but tolerated placement of iris well.  X ray ordered for confirmation of placement.  No epistaxis.

## 2024-07-22 ENCOUNTER — APPOINTMENT (OUTPATIENT)
Dept: RADIOLOGY | Facility: HOSPITAL | Age: 69
End: 2024-07-22
Payer: MEDICARE

## 2024-07-22 LAB
ALBUMIN SERPL BCP-MCNC: 3.4 G/DL (ref 3.4–5)
ANION GAP SERPL CALC-SCNC: 14 MMOL/L (ref 10–20)
BASOPHILS # BLD AUTO: 0.02 X10*3/UL (ref 0–0.1)
BASOPHILS NFR BLD AUTO: 0.2 %
BUN SERPL-MCNC: 16 MG/DL (ref 6–23)
CA-I BLD-SCNC: 1.13 MMOL/L (ref 1.1–1.33)
CALCIUM SERPL-MCNC: 8.7 MG/DL (ref 8.6–10.6)
CHLORIDE SERPL-SCNC: 101 MMOL/L (ref 98–107)
CO2 SERPL-SCNC: 28 MMOL/L (ref 21–32)
CREAT SERPL-MCNC: 0.37 MG/DL (ref 0.5–1.05)
EGFRCR SERPLBLD CKD-EPI 2021: >90 ML/MIN/1.73M*2
EOSINOPHIL # BLD AUTO: 0 X10*3/UL (ref 0–0.7)
EOSINOPHIL NFR BLD AUTO: 0 %
ERYTHROCYTE [DISTWIDTH] IN BLOOD BY AUTOMATED COUNT: 14.6 % (ref 11.5–14.5)
GLUCOSE BLD MANUAL STRIP-MCNC: 114 MG/DL (ref 74–99)
GLUCOSE SERPL-MCNC: 124 MG/DL (ref 74–99)
HCT VFR BLD AUTO: 33.6 % (ref 36–46)
HGB BLD-MCNC: 11.6 G/DL (ref 12–16)
IMM GRANULOCYTES # BLD AUTO: 0.03 X10*3/UL (ref 0–0.7)
IMM GRANULOCYTES NFR BLD AUTO: 0.4 % (ref 0–0.9)
LYMPHOCYTES # BLD AUTO: 0.61 X10*3/UL (ref 1.2–4.8)
LYMPHOCYTES NFR BLD AUTO: 7.1 %
MAGNESIUM SERPL-MCNC: 1.99 MG/DL (ref 1.6–2.4)
MCH RBC QN AUTO: 33.2 PG (ref 26–34)
MCHC RBC AUTO-ENTMCNC: 34.5 G/DL (ref 32–36)
MCV RBC AUTO: 96 FL (ref 80–100)
MONOCYTES # BLD AUTO: 0.54 X10*3/UL (ref 0.1–1)
MONOCYTES NFR BLD AUTO: 6.3 %
NEUTROPHILS # BLD AUTO: 7.35 X10*3/UL (ref 1.2–7.7)
NEUTROPHILS NFR BLD AUTO: 86 %
NRBC BLD-RTO: 0 /100 WBCS (ref 0–0)
PHOSPHATE SERPL-MCNC: 2.6 MG/DL (ref 2.5–4.9)
PLATELET # BLD AUTO: 280 X10*3/UL (ref 150–450)
POTASSIUM SERPL-SCNC: 3.8 MMOL/L (ref 3.5–5.3)
RBC # BLD AUTO: 3.49 X10*6/UL (ref 4–5.2)
SODIUM SERPL-SCNC: 139 MMOL/L (ref 136–145)
WBC # BLD AUTO: 8.6 X10*3/UL (ref 4.4–11.3)

## 2024-07-22 PROCEDURE — 2500000001 HC RX 250 WO HCPCS SELF ADMINISTERED DRUGS (ALT 637 FOR MEDICARE OP): Performed by: STUDENT IN AN ORGANIZED HEALTH CARE EDUCATION/TRAINING PROGRAM

## 2024-07-22 PROCEDURE — 92526 ORAL FUNCTION THERAPY: CPT | Mod: GN

## 2024-07-22 PROCEDURE — 2500000004 HC RX 250 GENERAL PHARMACY W/ HCPCS (ALT 636 FOR OP/ED): Performed by: STUDENT IN AN ORGANIZED HEALTH CARE EDUCATION/TRAINING PROGRAM

## 2024-07-22 PROCEDURE — 80069 RENAL FUNCTION PANEL: CPT | Performed by: STUDENT IN AN ORGANIZED HEALTH CARE EDUCATION/TRAINING PROGRAM

## 2024-07-22 PROCEDURE — 2500000004 HC RX 250 GENERAL PHARMACY W/ HCPCS (ALT 636 FOR OP/ED)

## 2024-07-22 PROCEDURE — 82947 ASSAY GLUCOSE BLOOD QUANT: CPT

## 2024-07-22 PROCEDURE — 2500000005 HC RX 250 GENERAL PHARMACY W/O HCPCS: Performed by: NEUROLOGICAL SURGERY

## 2024-07-22 PROCEDURE — 94640 AIRWAY INHALATION TREATMENT: CPT

## 2024-07-22 PROCEDURE — 74230 X-RAY XM SWLNG FUNCJ C+: CPT

## 2024-07-22 PROCEDURE — 82330 ASSAY OF CALCIUM: CPT | Performed by: STUDENT IN AN ORGANIZED HEALTH CARE EDUCATION/TRAINING PROGRAM

## 2024-07-22 PROCEDURE — 83735 ASSAY OF MAGNESIUM: CPT | Performed by: STUDENT IN AN ORGANIZED HEALTH CARE EDUCATION/TRAINING PROGRAM

## 2024-07-22 PROCEDURE — 36415 COLL VENOUS BLD VENIPUNCTURE: CPT | Performed by: STUDENT IN AN ORGANIZED HEALTH CARE EDUCATION/TRAINING PROGRAM

## 2024-07-22 PROCEDURE — 74230 X-RAY XM SWLNG FUNCJ C+: CPT | Performed by: RADIOLOGY

## 2024-07-22 PROCEDURE — 2500000002 HC RX 250 W HCPCS SELF ADMINISTERED DRUGS (ALT 637 FOR MEDICARE OP, ALT 636 FOR OP/ED): Performed by: STUDENT IN AN ORGANIZED HEALTH CARE EDUCATION/TRAINING PROGRAM

## 2024-07-22 PROCEDURE — 85025 COMPLETE CBC W/AUTO DIFF WBC: CPT | Performed by: STUDENT IN AN ORGANIZED HEALTH CARE EDUCATION/TRAINING PROGRAM

## 2024-07-22 PROCEDURE — 92611 MOTION FLUOROSCOPY/SWALLOW: CPT | Mod: GN

## 2024-07-22 PROCEDURE — 1100000001 HC PRIVATE ROOM DAILY

## 2024-07-22 RX ORDER — THIAMINE HYDROCHLORIDE 100 MG/ML
100 INJECTION, SOLUTION INTRAMUSCULAR; INTRAVENOUS DAILY
Status: DISCONTINUED | OUTPATIENT
Start: 2024-07-22 | End: 2024-07-27 | Stop reason: HOSPADM

## 2024-07-22 SDOH — ECONOMIC STABILITY: HOUSING INSECURITY: IN THE PAST 12 MONTHS, HOW MANY TIMES HAVE YOU MOVED WHERE YOU WERE LIVING?: 0

## 2024-07-22 SDOH — ECONOMIC STABILITY: HOUSING INSECURITY: AT ANY TIME IN THE PAST 12 MONTHS, WERE YOU HOMELESS OR LIVING IN A SHELTER (INCLUDING NOW)?: PATIENT DECLINED

## 2024-07-22 SDOH — SOCIAL STABILITY: SOCIAL INSECURITY: HAVE YOU HAD ANY THOUGHTS OF HARMING ANYONE ELSE?: NO

## 2024-07-22 SDOH — SOCIAL STABILITY: SOCIAL INSECURITY: ABUSE: ADULT

## 2024-07-22 SDOH — SOCIAL STABILITY: SOCIAL INSECURITY: DO YOU FEEL UNSAFE GOING BACK TO THE PLACE WHERE YOU ARE LIVING?: NO

## 2024-07-22 SDOH — SOCIAL STABILITY: SOCIAL INSECURITY: DOES ANYONE TRY TO KEEP YOU FROM HAVING/CONTACTING OTHER FRIENDS OR DOING THINGS OUTSIDE YOUR HOME?: NO

## 2024-07-22 SDOH — SOCIAL STABILITY: SOCIAL INSECURITY: WERE YOU ABLE TO COMPLETE ALL THE BEHAVIORAL HEALTH SCREENINGS?: YES

## 2024-07-22 SDOH — SOCIAL STABILITY: SOCIAL INSECURITY: ARE YOU OR HAVE YOU BEEN THREATENED OR ABUSED PHYSICALLY, EMOTIONALLY, OR SEXUALLY BY ANYONE?: NO

## 2024-07-22 SDOH — SOCIAL STABILITY: SOCIAL INSECURITY: HAVE YOU HAD THOUGHTS OF HARMING ANYONE ELSE?: NO

## 2024-07-22 SDOH — ECONOMIC STABILITY: TRANSPORTATION INSECURITY
IN THE PAST 12 MONTHS, HAS THE LACK OF TRANSPORTATION KEPT YOU FROM MEDICAL APPOINTMENTS OR FROM GETTING MEDICATIONS?: PATIENT DECLINED

## 2024-07-22 SDOH — SOCIAL STABILITY: SOCIAL INSECURITY: DO YOU FEEL ANYONE HAS EXPLOITED OR TAKEN ADVANTAGE OF YOU FINANCIALLY OR OF YOUR PERSONAL PROPERTY?: NO

## 2024-07-22 SDOH — ECONOMIC STABILITY: TRANSPORTATION INSECURITY
IN THE PAST 12 MONTHS, HAS LACK OF TRANSPORTATION KEPT YOU FROM MEETINGS, WORK, OR FROM GETTING THINGS NEEDED FOR DAILY LIVING?: PATIENT DECLINED

## 2024-07-22 SDOH — SOCIAL STABILITY: SOCIAL INSECURITY: HAS ANYONE EVER THREATENED TO HURT YOUR FAMILY OR YOUR PETS?: NO

## 2024-07-22 SDOH — ECONOMIC STABILITY: INCOME INSECURITY: HOW HARD IS IT FOR YOU TO PAY FOR THE VERY BASICS LIKE FOOD, HOUSING, MEDICAL CARE, AND HEATING?: PATIENT DECLINED

## 2024-07-22 SDOH — ECONOMIC STABILITY: INCOME INSECURITY: IN THE LAST 12 MONTHS, WAS THERE A TIME WHEN YOU WERE NOT ABLE TO PAY THE MORTGAGE OR RENT ON TIME?: PATIENT DECLINED

## 2024-07-22 SDOH — SOCIAL STABILITY: SOCIAL INSECURITY: ARE THERE ANY APPARENT SIGNS OF INJURIES/BEHAVIORS THAT COULD BE RELATED TO ABUSE/NEGLECT?: NO

## 2024-07-22 ASSESSMENT — ACTIVITIES OF DAILY LIVING (ADL)
PATIENT'S MEMORY ADEQUATE TO SAFELY COMPLETE DAILY ACTIVITIES?: YES
TOILETING: NEEDS ASSISTANCE
GROOMING: INDEPENDENT
FEEDING YOURSELF: INDEPENDENT
ASSISTIVE_DEVICE: BRACE RLE;WALKER
HEARING - RIGHT EAR: HEARING AID
BATHING: NEEDS ASSISTANCE
JUDGMENT_ADEQUATE_SAFELY_COMPLETE_DAILY_ACTIVITIES: YES
ADEQUATE_TO_COMPLETE_ADL: YES
HEARING - LEFT EAR: HEARING AID
DRESSING YOURSELF: NEEDS ASSISTANCE
WALKS IN HOME: NEEDS ASSISTANCE

## 2024-07-22 ASSESSMENT — COGNITIVE AND FUNCTIONAL STATUS - GENERAL
HELP NEEDED FOR BATHING: A LITTLE
DRESSING REGULAR LOWER BODY CLOTHING: A LITTLE
WALKING IN HOSPITAL ROOM: A LITTLE
MOBILITY SCORE: 18
TOILETING: A LITTLE
PATIENT BASELINE BEDBOUND: NO
DRESSING REGULAR UPPER BODY CLOTHING: A LITTLE
CLIMB 3 TO 5 STEPS WITH RAILING: TOTAL
MOVING TO AND FROM BED TO CHAIR: A LITTLE
STANDING UP FROM CHAIR USING ARMS: A LITTLE
PERSONAL GROOMING: A LITTLE
DAILY ACTIVITIY SCORE: 19

## 2024-07-22 ASSESSMENT — PAIN - FUNCTIONAL ASSESSMENT
PAIN_FUNCTIONAL_ASSESSMENT: 0-10

## 2024-07-22 ASSESSMENT — LIFESTYLE VARIABLES
HOW OFTEN DO YOU HAVE 6 OR MORE DRINKS ON ONE OCCASION: NEVER
SKIP TO QUESTIONS 9-10: 1
AUDIT-C TOTAL SCORE: 0
HOW MANY STANDARD DRINKS CONTAINING ALCOHOL DO YOU HAVE ON A TYPICAL DAY: PATIENT DOES NOT DRINK
HOW OFTEN DO YOU HAVE A DRINK CONTAINING ALCOHOL: NEVER
AUDIT-C TOTAL SCORE: 0

## 2024-07-22 ASSESSMENT — PATIENT HEALTH QUESTIONNAIRE - PHQ9
SUM OF ALL RESPONSES TO PHQ9 QUESTIONS 1 & 2: 0
1. LITTLE INTEREST OR PLEASURE IN DOING THINGS: NOT AT ALL
2. FEELING DOWN, DEPRESSED OR HOPELESS: NOT AT ALL

## 2024-07-22 ASSESSMENT — PAIN SCALES - GENERAL
PAINLEVEL_OUTOF10: 7
PAINLEVEL_OUTOF10: 6
PAINLEVEL_OUTOF10: 5 - MODERATE PAIN
PAINLEVEL_OUTOF10: 3
PAINLEVEL_OUTOF10: 5 - MODERATE PAIN
PAINLEVEL_OUTOF10: 3
PAINLEVEL_OUTOF10: 6
PAINLEVEL_OUTOF10: 3

## 2024-07-22 ASSESSMENT — PAIN DESCRIPTION - LOCATION: LOCATION: NECK

## 2024-07-22 ASSESSMENT — PAIN DESCRIPTION - ORIENTATION: ORIENTATION: MID

## 2024-07-22 NOTE — PROCEDURES
Speech-Language Pathology  Adult Inpatient Modified Barium Swallow Study (MBSS)    Patient Name: Sweta Lim  MRN: [id number]  Today's Date: 7/22/2024   Start Time: 1145  Stop Time: 1215  Time Calculation (min): 30    Initial MBSS: Yes    Respiratory Status: nasal cannula, NAD    History of Present Illness:   The patient is a 68 year old female with  chronic progressive neck pain and balance/gait dysfunction. She has a history of a prior C5-6 ACDF. Her past imaging has shown a large left paracentric disc bulge at C4-5 resulting in severe central stenosis. She also had a right paracentric disc bulge at C6-7 resulting in severe right foraminal stenosis. She has failed medical management and wishes to proceed with surgical intervention. She presents today for perioperative evaluation in anticipation of C4-5 Anterior cervical disectomy fusion on 7/17/24 with Dr. Crump.     Impression:   Modified Barium Swallow Study completed. Verbal consent obtained. Pt conversant/cooperative throughout study.     Severe-profound oropharyngeal dysphagia. Trials of thin liquids via tsp/straw, mildly (nectar) thick liquids via straw, moderately (honey) thick liquids via straw, and purees were given. Mildly prolonged manipulation of purees w/ lingual pumping. Inconsistent posterior loss w/ liquid consistencies.     Of note, pt w/ white column between posterior pharyngeal wall and cervical spine; team made aware.     Consistent delayed pharyngeal swallow initiation (exacerbated w/ thinner consistencies), incomplete epiglottic inversion/laryngeal vestibular closure, and reduced UES opening across all trials/consistencies. Additional reduced pharyngeal constriction resulted in mod-severe residue w/ all trials/consistencies. Pharyngeal deficits resulted in aspiration w/ thin and mildly (nectar) thick liquids during/after the swallow, aspiration of moderately (honey) thick liquids after the swallow, and penetration w/ purees. Pt  consistently sensed airway invasion and attempted to clear w/ throat clearing/coughing w/ inconsistent success. Required >1 min in between trials for coughing/expectoration of mucous and SOB. No further trials given 2/2 severity of swallow function.     Recommend NPO w/ frequent aggressive oral care. 5-7 ice chips/hr allowed for pleasure, safe swallow stimulation, and after oral care to prevent buildup of bacteria within the oropharynx. Repeat MBSS in 1-2 weeks. SLP will continue to follow w/ swallow re-assessment/to determine readiness for repeat instrumental as pt appropriate/schedule permits. RN/MD aware.    [place]:  Thin: 7 - Material enters airway, passes below the folds, not ejected despite effort.   Nectar: 7 - Material enters airway, passes below the folds, not ejected despite effort.   Honey: 7 - Material enters airway, passes below the folds, not ejected despite effort.   Puree: 3 - Material enters airway, remains above the folds, not ejected from airway.    Recommendations:  NPO with frequent aggressive oral care.  5-7 ice chips/hr allowed for pleasure, safe swallow stimulation, and after oral care to prevent buildup of bacteria within the oropharynx  SLP will continue to follow w/ swallow re-assessment/to determine readiness for repeat instrumental as pt appropriate/schedule permits    Goal:   Pt will tolerate least restrictive diet with no overt clinical s/s aspiration 100% of the time.        Plan:  SLP Services Indicated: Yes  Frequency: 2x week  Discussed POC with patient  SLP - OK to Discharge    Pain:   0-10  0 = No pain.     Inpatient Education:  Extensive education provided to patient regarding current swallow function, recommendations/results, and POC.      Consultations/Referrals/Coordination of Services:   N/A

## 2024-07-22 NOTE — CARE PLAN
Problem: Fall/Injury  Goal: Not fall by end of shift  Outcome: Progressing  Goal: Be free from injury by end of the shift  Outcome: Progressing  Goal: Verbalize understanding of personal risk factors for fall in the hospital  Outcome: Progressing  Goal: Verbalize understanding of risk factor reduction measures to prevent injury from fall in the home  Outcome: Progressing  Goal: Use assistive devices by end of the shift  Outcome: Progressing  Goal: Pace activities to prevent fatigue by end of the shift  Outcome: Progressing     Problem: Skin  Goal: Participates in plan/prevention/treatment measures  Outcome: Progressing  Goal: Prevent/manage excess moisture  Outcome: Progressing  Goal: Prevent/minimize sheer/friction injuries  Outcome: Progressing  Goal: Promote/optimize nutrition  Outcome: Progressing  Goal: Promote skin healing  Outcome: Progressing   The patient's goals for the shift include      The clinical goals for the shift include Patient will remain free from falls and injuries throughout shift

## 2024-07-22 NOTE — PROGRESS NOTES
Speech-Language Pathology  Adult Inpatient Swallow Treatment    Patient Name: Sweta Lim  MRN: [id number]  Today's Date: 7/22/2024   Start Time: 940  Stop Time: 1000  Time Calculation (min): 20    Impression:   Swallow re-assessment completed. Pt received awake/alert and upright in bed for session w/ dobhoff in place. Persistent significantly weak/hoarse vocal quality and reports continuing to utilize oral suctioning intermittently to manage oropharyngeal secretions. No wet/gurgly upper airway sounds prior to PO trials noted this date. Persistent weak cough prior to administration of PO trials.     Trials of ice chips x2, tsp sips water x2, and single/consecutive sips water were given. Normal oral management of all trials. Inconsistent overt clinical s/s aspiration characterized by immediate throat clearing w/ expectoration of mucous following final tsp and single sip water trials + immediate wet vocal quality following consecutive sips water via straw. No further trials given 2/2 c/f aspiration.     Given improvement in management of PO trials this date though w/ persistent overt clinical s/s aspiration, recommend MBSS to instrumentally evaluate swallow function/determine least restrictive diet; will complete as orders placed/radiology schedule permits.  Recommend NPO w/ frequent aggressive oral care until MBSS can be completed. 5-7 ice chips/hr allowed for pleasure, safe swallow stimulation, and after oral care to prevent buildup of bacteria within the oropharynx. RN/MD aware.      Recommendations:  NPO with frequent aggressive oral care.  5-7 ice chips/hr allowed for pleasure, safe swallow stimulation, and after oral care to prevent buildup of bacteria within the oropharynx  MBSS to instrumentally evaluate swallow function/determine least restrictive diet; will complete as orders placed/radiology schedule permits    Goal:   Pt will tolerate least restrictive diet with no overt clinical s/s aspiration 100% of  the time.        Plan:  SLP Services Indicated: Yes  Frequency: 2x week  Discussed POC with patient  SLP - OK to Discharge    Pain:   0-10  0 = No pain.     Inpatient Education:  Extensive education provided to patient regarding current swallow function, recommendations/results, and POC.      Consultations/Referrals/Coordination of Services:   N/A

## 2024-07-22 NOTE — PROGRESS NOTES
"Sweta Lim is a 68 y.o. female on day 3 of admission presenting with Degenerative cervical spinal stenosis.    Subjective   NAEON. Stable vitals and exam. Neck is soft and voice is unchanged.       Objective     Physical Exam  Neck soft with ecchymosis that is stable/decreasing. No significant swelling  A&Ox3  Hypophonic   Face symmetric  RUE D5 B5 T5 HG/IO 5  RLE HF5 KE5 DF4 PF 5  LUE D5 B5 T5 HG/IO 5  LLE HF5 KE5 DF/PF 5  Incision c/d/I  Cortrak in place    Last Recorded Vitals  Blood pressure 125/56, pulse 55, temperature 36 °C (96.8 °F), resp. rate 17, height 1.702 m (5' 7\"), weight 76.5 kg (168 lb 10.4 oz), SpO2 99%.  Intake/Output last 3 Shifts:  I/O last 3 completed shifts:  In: 120 (1.6 mL/kg) [NG/GT:120]  Out: 250 (3.3 mL/kg) [Urine:250 (0.1 mL/kg/hr)]  Weight: 76.5 kg     Relevant Results                              Assessment/Plan   Principal Problem:    Degenerative cervical spinal stenosis  Active Problems:    Cervical myelopathy (Multi)    Spondylosis, cervical, with myelopathy    Hematoma of neck    Pt is a 68yF h/o HTN, HLD, COPD, DEBRA, asthma, hypothyroidism, DM2, GERD, depression, anxiety, previous C5-6 ACDF, p/w neck pain and myelopathy, found to have L C4-5 paracentral disc, s/p C4-5 ACDF     7/19 Patient with increased neck swelling concerning for hematoma overnight, on exam patient with hoarse voice and difficulty swallowing, however difficulty with airway, breathing comfortably on room air      Lateral neck xray demonstrated sig incr neck swelling compared to prior c spine xrays     7/19 s/p right neck exploration and evacuation of hematoma      Plan  Floor  SLP recs -Appreciate recs re timing of MBS or any other intervention to assess swallowing  Nutrition recs  Continue to hold ASA, ok to restart POD14  Arrange 2 week  follow up for wound check  PTOT-SNF              Erendira Nino MD      "

## 2024-07-22 NOTE — CONSULTS
Nutrition Note:   Nutrition Assessment    Reason for Assessment: Provider consult order (Reconsult for nutrition assessment)    This patient was fully assessed on 7/20 - detailed nutrition recommendations provided. This service was reconsulted for nutrition assessment.     SLP informed RDN and RN this morning that she is doing better today; therefore, plan is for MBSS.     Nutrition Interventions/Recommendations         Nutrition Prescription:  Individualized Nutrition Prescription Provided for : diet vs enteral nutrition:   If pt passes MBSS, then diet consistency per SLP recommendations.   If pt fails MBSS and requires enteral nutrition, then please see tube feeding recommendations on 7/20 as they still remain appropriate.       Nutrition Monitoring and Evaluation   Food/Nutrient Related History Monitoring  Additional Plans: Plan to monitor route of nutrition and provide ONS if appropriate vs tube feeding    Time Spent (min): 30 minutes

## 2024-07-22 NOTE — CARE PLAN
Problem: Fall/Injury  Goal: Not fall by end of shift  Outcome: Progressing  Goal: Be free from injury by end of the shift  Outcome: Progressing  Goal: Verbalize understanding of personal risk factors for fall in the hospital  Outcome: Progressing  Goal: Verbalize understanding of risk factor reduction measures to prevent injury from fall in the home  Outcome: Progressing  Goal: Pace activities to prevent fatigue by end of the shift  Outcome: Progressing     Problem: Skin  Goal: Participates in plan/prevention/treatment measures  Outcome: Progressing  Flowsheets (Taken 7/22/2024 0933)  Participates in plan/prevention/treatment measures: Discuss with provider PT/OT consult  Goal: Prevent/manage excess moisture  Outcome: Progressing  Flowsheets (Taken 7/22/2024 0933)  Prevent/manage excess moisture: Cleanse incontinence/protect with barrier cream  Goal: Prevent/minimize sheer/friction injuries  Outcome: Progressing  Flowsheets (Taken 7/22/2024 0933)  Prevent/minimize sheer/friction injuries:   Turn/reposition every 2 hours/use positioning/transfer devices   Increase activity/out of bed for meals  Goal: Promote/optimize nutrition  Outcome: Progressing  Flowsheets (Taken 7/22/2024 0933)  Promote/optimize nutrition: Discuss with provider if NPO > 2 days  Goal: Promote skin healing  Outcome: Progressing  Flowsheets (Taken 7/22/2024 0933)  Promote skin healing:   Protective dressings over bony prominences   Assess skin/pad under line(s)/device(s)     Problem: Chronic Conditions and Co-morbidities  Goal: Patient's chronic conditions and co-morbidity symptoms are monitored and maintained or improved  Outcome: Progressing     Problem: Diabetes  Goal: Increase stability of blood glucose readings by end of shift  Outcome: Progressing  Goal: Maintain electrolyte levels within acceptable range throughout shift  Outcome: Progressing  Goal: Maintain glucose levels >70mg/dl to <250mg/dl throughout shift  Outcome: Progressing  Goal:  No changes in neurological exam by end of shift  Outcome: Progressing  Goal: Vital signs within normal range for age by end of shift  Outcome: Progressing     Problem: Pain  Goal: Takes deep breaths with improved pain control throughout the shift  Outcome: Progressing  Goal: Turns in bed with improved pain control throughout the shift  Outcome: Progressing  Goal: Performs ADL's with improved pain control throughout shift  Outcome: Progressing  Goal: Participates in PT with improved pain control throughout the shift  Outcome: Progressing  Goal: Free from acute confusion related to pain meds throughout the shift  Outcome: Progressing

## 2024-07-23 LAB
ALBUMIN SERPL BCP-MCNC: 3.7 G/DL (ref 3.4–5)
ANION GAP SERPL CALC-SCNC: 13 MMOL/L (ref 10–20)
BASOPHILS # BLD AUTO: 0.01 X10*3/UL (ref 0–0.1)
BASOPHILS NFR BLD AUTO: 0.1 %
BUN SERPL-MCNC: 16 MG/DL (ref 6–23)
CALCIUM SERPL-MCNC: 9.2 MG/DL (ref 8.6–10.6)
CHLORIDE SERPL-SCNC: 100 MMOL/L (ref 98–107)
CO2 SERPL-SCNC: 30 MMOL/L (ref 21–32)
CREAT SERPL-MCNC: 0.4 MG/DL (ref 0.5–1.05)
EGFRCR SERPLBLD CKD-EPI 2021: >90 ML/MIN/1.73M*2
EOSINOPHIL # BLD AUTO: 0.01 X10*3/UL (ref 0–0.7)
EOSINOPHIL NFR BLD AUTO: 0.1 %
ERYTHROCYTE [DISTWIDTH] IN BLOOD BY AUTOMATED COUNT: 14.6 % (ref 11.5–14.5)
GLUCOSE BLD MANUAL STRIP-MCNC: 139 MG/DL (ref 74–99)
GLUCOSE BLD MANUAL STRIP-MCNC: 151 MG/DL (ref 74–99)
GLUCOSE BLD MANUAL STRIP-MCNC: 161 MG/DL (ref 74–99)
GLUCOSE BLD MANUAL STRIP-MCNC: 178 MG/DL (ref 74–99)
GLUCOSE SERPL-MCNC: 141 MG/DL (ref 74–99)
HCT VFR BLD AUTO: 40.3 % (ref 36–46)
HGB BLD-MCNC: 13.2 G/DL (ref 12–16)
IMM GRANULOCYTES # BLD AUTO: 0.03 X10*3/UL (ref 0–0.7)
IMM GRANULOCYTES NFR BLD AUTO: 0.4 % (ref 0–0.9)
LYMPHOCYTES # BLD AUTO: 1.13 X10*3/UL (ref 1.2–4.8)
LYMPHOCYTES NFR BLD AUTO: 16.4 %
MAGNESIUM SERPL-MCNC: 2.08 MG/DL (ref 1.6–2.4)
MCH RBC QN AUTO: 33.7 PG (ref 26–34)
MCHC RBC AUTO-ENTMCNC: 32.8 G/DL (ref 32–36)
MCV RBC AUTO: 103 FL (ref 80–100)
MONOCYTES # BLD AUTO: 0.45 X10*3/UL (ref 0.1–1)
MONOCYTES NFR BLD AUTO: 6.5 %
NEUTROPHILS # BLD AUTO: 5.26 X10*3/UL (ref 1.2–7.7)
NEUTROPHILS NFR BLD AUTO: 76.5 %
NRBC BLD-RTO: 0 /100 WBCS (ref 0–0)
PHOSPHATE SERPL-MCNC: 2.3 MG/DL (ref 2.5–4.9)
PLATELET # BLD AUTO: 355 X10*3/UL (ref 150–450)
POTASSIUM SERPL-SCNC: 4.1 MMOL/L (ref 3.5–5.3)
RBC # BLD AUTO: 3.92 X10*6/UL (ref 4–5.2)
SODIUM SERPL-SCNC: 139 MMOL/L (ref 136–145)
WBC # BLD AUTO: 6.9 X10*3/UL (ref 4.4–11.3)

## 2024-07-23 PROCEDURE — 97530 THERAPEUTIC ACTIVITIES: CPT | Mod: GP,CQ

## 2024-07-23 PROCEDURE — 2500000004 HC RX 250 GENERAL PHARMACY W/ HCPCS (ALT 636 FOR OP/ED): Performed by: STUDENT IN AN ORGANIZED HEALTH CARE EDUCATION/TRAINING PROGRAM

## 2024-07-23 PROCEDURE — 36415 COLL VENOUS BLD VENIPUNCTURE: CPT | Performed by: STUDENT IN AN ORGANIZED HEALTH CARE EDUCATION/TRAINING PROGRAM

## 2024-07-23 PROCEDURE — 83735 ASSAY OF MAGNESIUM: CPT | Performed by: STUDENT IN AN ORGANIZED HEALTH CARE EDUCATION/TRAINING PROGRAM

## 2024-07-23 PROCEDURE — 80069 RENAL FUNCTION PANEL: CPT | Performed by: STUDENT IN AN ORGANIZED HEALTH CARE EDUCATION/TRAINING PROGRAM

## 2024-07-23 PROCEDURE — 2500000002 HC RX 250 W HCPCS SELF ADMINISTERED DRUGS (ALT 637 FOR MEDICARE OP, ALT 636 FOR OP/ED): Performed by: STUDENT IN AN ORGANIZED HEALTH CARE EDUCATION/TRAINING PROGRAM

## 2024-07-23 PROCEDURE — 94640 AIRWAY INHALATION TREATMENT: CPT

## 2024-07-23 PROCEDURE — 85025 COMPLETE CBC W/AUTO DIFF WBC: CPT | Performed by: STUDENT IN AN ORGANIZED HEALTH CARE EDUCATION/TRAINING PROGRAM

## 2024-07-23 PROCEDURE — 2500000004 HC RX 250 GENERAL PHARMACY W/ HCPCS (ALT 636 FOR OP/ED)

## 2024-07-23 PROCEDURE — 2500000001 HC RX 250 WO HCPCS SELF ADMINISTERED DRUGS (ALT 637 FOR MEDICARE OP): Performed by: STUDENT IN AN ORGANIZED HEALTH CARE EDUCATION/TRAINING PROGRAM

## 2024-07-23 PROCEDURE — 82947 ASSAY GLUCOSE BLOOD QUANT: CPT

## 2024-07-23 PROCEDURE — 2500000004 HC RX 250 GENERAL PHARMACY W/ HCPCS (ALT 636 FOR OP/ED): Performed by: PHYSICIAN ASSISTANT

## 2024-07-23 PROCEDURE — 1100000001 HC PRIVATE ROOM DAILY

## 2024-07-23 PROCEDURE — 97116 GAIT TRAINING THERAPY: CPT | Mod: GP,CQ

## 2024-07-23 RX ORDER — HYDRALAZINE HYDROCHLORIDE 20 MG/ML
5 INJECTION INTRAMUSCULAR; INTRAVENOUS EVERY 6 HOURS PRN
Status: DISCONTINUED | OUTPATIENT
Start: 2024-07-23 | End: 2024-07-27 | Stop reason: HOSPADM

## 2024-07-23 ASSESSMENT — COGNITIVE AND FUNCTIONAL STATUS - GENERAL
DRESSING REGULAR LOWER BODY CLOTHING: A LITTLE
HELP NEEDED FOR BATHING: A LITTLE
DRESSING REGULAR UPPER BODY CLOTHING: A LITTLE
TOILETING: A LITTLE
CLIMB 3 TO 5 STEPS WITH RAILING: A LOT
MOVING TO AND FROM BED TO CHAIR: A LITTLE
PERSONAL GROOMING: A LITTLE
STANDING UP FROM CHAIR USING ARMS: A LITTLE
DAILY ACTIVITIY SCORE: 19
TURNING FROM BACK TO SIDE WHILE IN FLAT BAD: A LOT
MOBILITY SCORE: 16
MOVING TO AND FROM BED TO CHAIR: A LITTLE
WALKING IN HOSPITAL ROOM: A LITTLE
CLIMB 3 TO 5 STEPS WITH RAILING: A LOT
MOVING FROM LYING ON BACK TO SITTING ON SIDE OF FLAT BED WITH BEDRAILS: A LITTLE
WALKING IN HOSPITAL ROOM: A LITTLE
MOBILITY SCORE: 16
TURNING FROM BACK TO SIDE WHILE IN FLAT BAD: A LOT
STANDING UP FROM CHAIR USING ARMS: A LITTLE
MOVING FROM LYING ON BACK TO SITTING ON SIDE OF FLAT BED WITH BEDRAILS: A LITTLE

## 2024-07-23 ASSESSMENT — PAIN SCALES - GENERAL
PAINLEVEL_OUTOF10: 6
PAINLEVEL_OUTOF10: 7
PAINLEVEL_OUTOF10: 4
PAINLEVEL_OUTOF10: 4
PAINLEVEL_OUTOF10: 6

## 2024-07-23 ASSESSMENT — PAIN - FUNCTIONAL ASSESSMENT
PAIN_FUNCTIONAL_ASSESSMENT: 0-10
PAIN_FUNCTIONAL_ASSESSMENT: 0-10

## 2024-07-23 NOTE — NURSING NOTE
Patient arrived from NICU. Alert and oriented x4. Able to voice needs. Very pleasant.  Belongings at bedside. Tube feed started @ 155cc/hr. Oriented to surroundings. Call light within reach.

## 2024-07-23 NOTE — CARE PLAN
The patient's goals for the shift include      The clinical goals for the shift include patient will remain hemodynamically stable through end of shift    Over the shift, the patient did not make progress toward the following goals. Barriers to progression include . Recommendations to address these barriers include .

## 2024-07-23 NOTE — PROGRESS NOTES
This LSW met with the patient and her son at bedside to discuss her physical therapy recommendation of moderate intensity. After our discussion this LSW provided the patient with a skilled list. The patient reported that she will review the list tonight and will be prepared to render her selection tomorrow morning.

## 2024-07-23 NOTE — PROGRESS NOTES
Physical Therapy    Physical Therapy Treatment    Patient Name: Sweta Lim  MRN: [id number]  Today's Date: 7/23/2024  Time Calculation  Start Time: 1157  Stop Time: 1223  Time Calculation (min): 26 min    Assessment/Plan   PT Assessment  PT Assessment Results: Decreased strength, Decreased endurance, Impaired balance, Decreased mobility, Decreased cognition, Decreased safety awareness, Impaired judgement  Rehab Prognosis: Good  Evaluation/Treatment Tolerance: Patient tolerated treatment well  Medical Staff Made Aware: Yes  Strengths: Attitude of self  End of Session Communication: Bedside nurse  Assessment Comment: Tolerated increased amb distance this date. Continues to demo weakness and decreased safety awareness. Continue to rec MOD intensity PT.  End of Session Patient Position: Bed, 3 rail up, Alarm on     PT Plan  Treatment/Interventions: Bed mobility, Transfer training, Gait training, Stair training, Strengthening, Endurance training, Therapeutic exercise, Therapeutic activity  PT Plan: Ongoing PT  PT Frequency: Daily  PT Discharge Recommendations: Moderate intensity level of continued care  PT Recommended Transfer Status: Assist x1, Assistive device  PT - OK to Discharge: Yes      General Visit Information:   PT  Visit  PT Received On: 07/23/24  Response to Previous Treatment: Patient with no complaints from previous session.  General  Family/Caregiver Present: No  Prior to Session Communication: Bedside nurse  Patient Position Received: Bed, 3 rail up, Alarm off, not on at start of session  Preferred Learning Style: verbal, visual  General Comment: Patient pleasant and willing to participate in therapy session. Easily distracted at times and requiring increased cues to focus on task at hand.    Subjective   Precautions:  Precautions  Medical Precautions: Fall precautions  Post-Surgical Precautions: Spinal precautions  Precautions Comment: unable to state spinal precautions  Vital Signs:       Objective    Pain:  Pain Assessment  0-10 (Numeric) Pain Score: 6  Pain Type: Surgical pain  Cognition:  Cognition  Orientation Level: Oriented X4  Coordination:     Postural Control:  Static Standing Balance  Static Standing-Balance Support: No upper extremity supported  Static Standing-Level of Assistance: Contact guard  Static Standing-Comment/Number of Minutes: static stance with narrow NORRIS for :30 sec    Activity Tolerance:  Activity Tolerance  Endurance: Tolerates 10 - 20 min exercise with multiple rests  Treatments:       Bed Mobility  Bed Mobility: Yes  Bed Mobility 1  Bed Mobility 1: Supine to sitting, Sitting to supine  Level of Assistance 1: Minimum assistance, Minimal tactile cues, Moderate verbal cues  Bed Mobility Comments 1: HOB slightly elevated    Ambulation/Gait Training  Ambulation/Gait Training Performed: Yes  Ambulation/Gait Training 1  Surface 1: Level tile, Carpet  Device 1: Rolling walker  Assistance 1: Contact guard, Moderate verbal cues  Quality of Gait 1: Narrow base of support, Inconsistent stride length, Decreased step length, Shuffling gait, Soft knee(s), Forward flexed posture  Comments/Distance (ft) 1: 50 ft x 2  Transfers  Transfer: Yes  Transfer 1  Transfer From 1: Sit to, Stand to  Transfer to 1: Stand, Sit  Technique 1: Sit to stand, Stand to sit  Transfer Device 1: Walker  Transfer Level of Assistance 1: Minimum assistance, Minimal verbal cues, Minimal tactile cues  Trials/Comments 1: x3 trials    Outcome Measures:  [facility] Basic Mobility  Turning from your back to your side while in a flat bed without using bedrails: A little  Moving from lying on your back to sitting on the side of a flat bed without using bedrails: A lot  Moving to and from bed to chair (including a wheelchair): A little  Standing up from a chair using your arms (e.g. wheelchair or bedside chair): A little  To walk in hospital room: A little  Climbing 3-5 steps with railing: A lot  Basic Mobility - Total Score:  16    Education Documentation  Precautions, taught by Mary Maloney PTA at 7/23/2024  1:58 PM.  Learner: Patient  Readiness: Eager  Method: Explanation  Response: Needs Reinforcement  Comment: Education provided on spinal precautions and safety/sequencing when amb with fww    Body Mechanics, taught by Mary Maloney PTA at 7/23/2024  1:58 PM.  Learner: Patient  Readiness: Eager  Method: Explanation  Response: Needs Reinforcement  Comment: Education provided on spinal precautions and safety/sequencing when amb with fww    Mobility Training, taught by Mary Mlaoney PTA at 7/23/2024  1:58 PM.  Learner: Patient  Readiness: Eager  Method: Explanation  Response: Needs Reinforcement  Comment: Education provided on spinal precautions and safety/sequencing when amb with fww    Education Comments  No comments found.        OP EDUCATION:       Encounter Problems       Encounter Problems (Active)       Balance       Pt will score >/=24/28 on Tinetti to demonstrate decreased falls risk (Progressing)       Start:  07/18/24    Expected End:  08/01/24               Mobility       Pt will be SBA to ascend/descend 4 steps with LRAD and 7 steps with 1 handrail (Progressing)       Start:  07/18/24    Expected End:  08/01/24            Pt will be SBA ambulation 100 ft with RW (Progressing)       Start:  07/18/24    Expected End:  08/01/24               PT Transfers       Pt will be SBA for sit to stand and bed to chair transfers with RW (Progressing)       Start:  07/18/24    Expected End:  08/01/24            Pt will be [place] with bed mobility (Progressing)       Start:  07/18/24    Expected End:  08/01/24               Pain - Adult

## 2024-07-23 NOTE — NURSING NOTE
Skin check completed with Fatou Hastings RN. Patient with surgical incisions, no additional findings noted.

## 2024-07-23 NOTE — CARE PLAN
The patient's goals for the shift include        Problem: Fall/Injury  Goal: Not fall by end of shift  Outcome: Met  Goal: Be free from injury by end of the shift  Outcome: Met     Problem: Pain - Adult  Goal: Verbalizes/displays adequate comfort level or baseline comfort level  Outcome: Met     Problem: Safety - Adult  Goal: Free from fall injury  Outcome: Met

## 2024-07-23 NOTE — PROGRESS NOTES
"Sweta Lim is a 68 y.o. female on day 4 of admission presenting with Degenerative cervical spinal stenosis.    Subjective   No acute events overnight, was transferred to floor, neck is soft and voice is unchanged.       Objective     Physical Exam  Neck soft with ecchymosis that is stable/decreasing. No significant swelling  A&Ox3  Hypophonic   Face symmetric  RUE D5 B5 T5 HG/IO 5  RLE HF5 KE5 DF4 PF 5  LUE D5 B5 T5 HG/IO 5  LLE HF5 KE5 DF/PF 5  Incision c/d/I  Cortrak in place    Last Recorded Vitals  Blood pressure 178/83, pulse 62, temperature 36.8 °C (98.2 °F), temperature source Temporal, resp. rate 17, height 1.702 m (5' 7\"), weight 76.5 kg (168 lb 10.4 oz), SpO2 93%.  Intake/Output last 3 Shifts:  I/O last 3 completed shifts:  In: 600 (7.8 mL/kg) [I.V.:50 (0.7 mL/kg); NG/GT:550]  Out: 150 (2 mL/kg) [Urine:150 (0.1 mL/kg/hr)]  Weight: 76.5 kg         Assessment/Plan   Principal Problem:    Degenerative cervical spinal stenosis  Active Problems:    Cervical myelopathy (Multi)    Spondylosis, cervical, with myelopathy    Hematoma of neck    Pt is a 68yF h/o HTN, HLD, COPD, DEBRA, asthma, hypothyroidism, DM2, GERD, depression, anxiety, previous C5-6 ACDF, p/w neck pain and myelopathy, found to have L C4-5 paracentral disc, s/p C4-5 ACDF     7/19 Patient with increased neck swelling concerning for hematoma overnight, on exam patient with hoarse voice and difficulty swallowing, however difficulty with airway, breathing comfortably on room air      Lateral neck xray demonstrated sig incr neck swelling compared to prior c spine xrays     7/19 s/p right neck exploration and evacuation of hematoma      Plan  Floor  She failed her SLP, so she needs to have her Cortrak bridled  Nutrition recs  Follow up HTN meds as she has been hypertensive  Continue to hold ASA, ok to restart POD14  Arrange 2 week  follow up for wound check  PTOT-SNF         Lawrence Cline MD      "

## 2024-07-24 ENCOUNTER — APPOINTMENT (OUTPATIENT)
Dept: RADIOLOGY | Facility: HOSPITAL | Age: 69
End: 2024-07-24
Payer: MEDICARE

## 2024-07-24 LAB
ALBUMIN SERPL BCP-MCNC: 3.7 G/DL (ref 3.4–5)
ANION GAP SERPL CALC-SCNC: 13 MMOL/L (ref 10–20)
BASOPHILS # BLD AUTO: 0.03 X10*3/UL (ref 0–0.1)
BASOPHILS NFR BLD AUTO: 0.5 %
BUN SERPL-MCNC: 16 MG/DL (ref 6–23)
CALCIUM SERPL-MCNC: 9.2 MG/DL (ref 8.6–10.6)
CHLORIDE SERPL-SCNC: 100 MMOL/L (ref 98–107)
CO2 SERPL-SCNC: 32 MMOL/L (ref 21–32)
CREAT SERPL-MCNC: 0.44 MG/DL (ref 0.5–1.05)
EGFRCR SERPLBLD CKD-EPI 2021: >90 ML/MIN/1.73M*2
EOSINOPHIL # BLD AUTO: 0.01 X10*3/UL (ref 0–0.7)
EOSINOPHIL NFR BLD AUTO: 0.2 %
ERYTHROCYTE [DISTWIDTH] IN BLOOD BY AUTOMATED COUNT: 14.6 % (ref 11.5–14.5)
GLUCOSE BLD MANUAL STRIP-MCNC: 139 MG/DL (ref 74–99)
GLUCOSE BLD MANUAL STRIP-MCNC: 141 MG/DL (ref 74–99)
GLUCOSE BLD MANUAL STRIP-MCNC: 148 MG/DL (ref 74–99)
GLUCOSE BLD MANUAL STRIP-MCNC: 174 MG/DL (ref 74–99)
GLUCOSE SERPL-MCNC: 124 MG/DL (ref 74–99)
HCT VFR BLD AUTO: 42.4 % (ref 36–46)
HGB BLD-MCNC: 13.9 G/DL (ref 12–16)
IMM GRANULOCYTES # BLD AUTO: 0.04 X10*3/UL (ref 0–0.7)
IMM GRANULOCYTES NFR BLD AUTO: 0.7 % (ref 0–0.9)
LYMPHOCYTES # BLD AUTO: 1.2 X10*3/UL (ref 1.2–4.8)
LYMPHOCYTES NFR BLD AUTO: 19.6 %
MAGNESIUM SERPL-MCNC: 1.97 MG/DL (ref 1.6–2.4)
MCH RBC QN AUTO: 33.9 PG (ref 26–34)
MCHC RBC AUTO-ENTMCNC: 32.8 G/DL (ref 32–36)
MCV RBC AUTO: 103 FL (ref 80–100)
MONOCYTES # BLD AUTO: 0.58 X10*3/UL (ref 0.1–1)
MONOCYTES NFR BLD AUTO: 9.5 %
NEUTROPHILS # BLD AUTO: 4.26 X10*3/UL (ref 1.2–7.7)
NEUTROPHILS NFR BLD AUTO: 69.5 %
NRBC BLD-RTO: 0 /100 WBCS (ref 0–0)
PHOSPHATE SERPL-MCNC: 2.8 MG/DL (ref 2.5–4.9)
PLATELET # BLD AUTO: 397 X10*3/UL (ref 150–450)
POTASSIUM SERPL-SCNC: 3.5 MMOL/L (ref 3.5–5.3)
RBC # BLD AUTO: 4.1 X10*6/UL (ref 4–5.2)
SODIUM SERPL-SCNC: 141 MMOL/L (ref 136–145)
WBC # BLD AUTO: 6.1 X10*3/UL (ref 4.4–11.3)

## 2024-07-24 PROCEDURE — 2500000004 HC RX 250 GENERAL PHARMACY W/ HCPCS (ALT 636 FOR OP/ED)

## 2024-07-24 PROCEDURE — 2500000001 HC RX 250 WO HCPCS SELF ADMINISTERED DRUGS (ALT 637 FOR MEDICARE OP): Performed by: STUDENT IN AN ORGANIZED HEALTH CARE EDUCATION/TRAINING PROGRAM

## 2024-07-24 PROCEDURE — 2500000001 HC RX 250 WO HCPCS SELF ADMINISTERED DRUGS (ALT 637 FOR MEDICARE OP): Performed by: PHYSICIAN ASSISTANT

## 2024-07-24 PROCEDURE — 36415 COLL VENOUS BLD VENIPUNCTURE: CPT | Performed by: STUDENT IN AN ORGANIZED HEALTH CARE EDUCATION/TRAINING PROGRAM

## 2024-07-24 PROCEDURE — 80069 RENAL FUNCTION PANEL: CPT | Performed by: STUDENT IN AN ORGANIZED HEALTH CARE EDUCATION/TRAINING PROGRAM

## 2024-07-24 PROCEDURE — 74018 RADEX ABDOMEN 1 VIEW: CPT | Performed by: RADIOLOGY

## 2024-07-24 PROCEDURE — 2500000004 HC RX 250 GENERAL PHARMACY W/ HCPCS (ALT 636 FOR OP/ED): Performed by: PHYSICIAN ASSISTANT

## 2024-07-24 PROCEDURE — 2500000002 HC RX 250 W HCPCS SELF ADMINISTERED DRUGS (ALT 637 FOR MEDICARE OP, ALT 636 FOR OP/ED): Performed by: STUDENT IN AN ORGANIZED HEALTH CARE EDUCATION/TRAINING PROGRAM

## 2024-07-24 PROCEDURE — 94640 AIRWAY INHALATION TREATMENT: CPT

## 2024-07-24 PROCEDURE — 2500000004 HC RX 250 GENERAL PHARMACY W/ HCPCS (ALT 636 FOR OP/ED): Performed by: STUDENT IN AN ORGANIZED HEALTH CARE EDUCATION/TRAINING PROGRAM

## 2024-07-24 PROCEDURE — 82947 ASSAY GLUCOSE BLOOD QUANT: CPT

## 2024-07-24 PROCEDURE — 83735 ASSAY OF MAGNESIUM: CPT | Performed by: STUDENT IN AN ORGANIZED HEALTH CARE EDUCATION/TRAINING PROGRAM

## 2024-07-24 PROCEDURE — C9113 INJ PANTOPRAZOLE SODIUM, VIA: HCPCS

## 2024-07-24 PROCEDURE — 85025 COMPLETE CBC W/AUTO DIFF WBC: CPT | Performed by: STUDENT IN AN ORGANIZED HEALTH CARE EDUCATION/TRAINING PROGRAM

## 2024-07-24 PROCEDURE — 1100000001 HC PRIVATE ROOM DAILY

## 2024-07-24 PROCEDURE — 74018 RADEX ABDOMEN 1 VIEW: CPT

## 2024-07-24 RX ORDER — PANTOPRAZOLE SODIUM 40 MG/10ML
40 INJECTION, POWDER, LYOPHILIZED, FOR SOLUTION INTRAVENOUS
Status: DISCONTINUED | OUTPATIENT
Start: 2024-07-24 | End: 2024-07-27 | Stop reason: HOSPADM

## 2024-07-24 RX ORDER — POLYETHYLENE GLYCOL 3350 17 G/17G
17 POWDER, FOR SOLUTION ORAL 3 TIMES DAILY
Status: DISCONTINUED | OUTPATIENT
Start: 2024-07-24 | End: 2024-07-27 | Stop reason: HOSPADM

## 2024-07-24 RX ORDER — BISACODYL 10 MG/1
10 SUPPOSITORY RECTAL DAILY PRN
Status: DISCONTINUED | OUTPATIENT
Start: 2024-07-24 | End: 2024-07-27 | Stop reason: HOSPADM

## 2024-07-24 RX ORDER — PANTOPRAZOLE SODIUM 40 MG/1
40 TABLET, DELAYED RELEASE ORAL
Status: DISCONTINUED | OUTPATIENT
Start: 2024-07-24 | End: 2024-07-27 | Stop reason: HOSPADM

## 2024-07-24 RX ORDER — LIDOCAINE HYDROCHLORIDE 20 MG/ML
1 JELLY TOPICAL ONCE
Status: DISCONTINUED | OUTPATIENT
Start: 2024-07-24 | End: 2024-07-27 | Stop reason: HOSPADM

## 2024-07-24 RX ORDER — POLYETHYLENE GLYCOL 3350 17 G/17G
17 POWDER, FOR SOLUTION ORAL DAILY
Status: CANCELLED
Start: 2024-07-24

## 2024-07-24 ASSESSMENT — PAIN DESCRIPTION - ORIENTATION: ORIENTATION: RIGHT;LEFT

## 2024-07-24 ASSESSMENT — COGNITIVE AND FUNCTIONAL STATUS - GENERAL
HELP NEEDED FOR BATHING: A LOT
MOVING FROM LYING ON BACK TO SITTING ON SIDE OF FLAT BED WITH BEDRAILS: A LITTLE
DAILY ACTIVITIY SCORE: 12
WALKING IN HOSPITAL ROOM: A LOT
MOVING TO AND FROM BED TO CHAIR: A LITTLE
EATING MEALS: A LOT
CLIMB 3 TO 5 STEPS WITH RAILING: A LOT
DRESSING REGULAR LOWER BODY CLOTHING: A LOT
TOILETING: A LOT
TURNING FROM BACK TO SIDE WHILE IN FLAT BAD: A LITTLE
DRESSING REGULAR UPPER BODY CLOTHING: A LOT
PERSONAL GROOMING: A LOT
STANDING UP FROM CHAIR USING ARMS: A LOT
MOBILITY SCORE: 15

## 2024-07-24 ASSESSMENT — PAIN SCALES - GENERAL
PAINLEVEL_OUTOF10: 5 - MODERATE PAIN
PAINLEVEL_OUTOF10: 7
PAINLEVEL_OUTOF10: 7
PAINLEVEL_OUTOF10: 8

## 2024-07-24 ASSESSMENT — PAIN DESCRIPTION - LOCATION
LOCATION: LEG
LOCATION: NECK

## 2024-07-24 ASSESSMENT — PAIN - FUNCTIONAL ASSESSMENT: PAIN_FUNCTIONAL_ASSESSMENT: 0-10

## 2024-07-24 NOTE — CONSULTS
Wound Care Consult     Visit Date: 7/24/2024      Patient Name: Sweta Lim         MRN: [id number]           YOB: 1955     Reason for Consult: throat incision     Wound History: Pt is s/p C4-5 ACDF on 7/17 and hematoma evacuation at same site on 7/19; now with c/f wound drainage    Wound Assessment:  Wound 07/17/24 Incision Throat Anterior (Active)   Site Assessment Clean;Dry 07/24/24 0000   Kendal-Wound Assessment Clean;Dry 07/24/24 0000   Margins Attached edges 07/24/24 0000   Closure Glue;Open to air 07/24/24 0000   Sutures/Staple Line Approximated 07/24/24 0000   Drainage Description None 07/24/24 0000   Drainage Amount None 07/24/24 0000   Dressing Open to air 07/24/24 0000   Adhesive Closure Strips Intact 07/24/24 0000       Wound 07/19/24 Incision Neck (Active)   Wound Image   07/24/24 1202   Site Assessment Purple;Edema 07/24/24 1202   Kendal-Wound Assessment Edema;Ecchymotic 07/24/24 1202   Wound Length (cm) 0.2 cm 07/24/24 1202   Wound Width (cm) 4 cm 07/24/24 1202   Wound Surface Area (cm^2) 0.8 cm^2 07/24/24 1202   Margins Attached edges 07/24/24 0000   Closure Glue 07/24/24 1202   Sutures/Staple Line Approximated 07/24/24 0000   Drainage Description None 07/24/24 0000   Drainage Amount None 07/24/24 1202   Dressing Open to air 07/24/24 1202     Wound Team Summary Assessment: Pt seen sitting up in chair. Throat incision approximated with dermabond. Periwound edematous and bruised. Wound cleaned with Vashe wound cleanser soak. Only old SS drainage noted on skin. No active drainage seen. No odor. Pt's entire upper body gently cleaned with CHG wipes, and new gown placed (old one soiled with wound drainage). Gauze tucked into neckline to monitor for new drainage. Assessment findings and recs d/w primary RN.      Wound Team Plan: Keep throat incision clean and dry. Recommend gently cleaning BID using Vashe wound cleanser (stocked on most units, can also order from  via [facility] #[id number]). May  soak 4x4 with Vashe and let rest on wound for at least 20 seconds. Please monitor for new drainage and signs of infection. Contact Neurosurgery and/or Wound Care with concerns.     Provider, please review.      Tatiana Sawyer RN, CWON  7/24/2024  12:04 PM

## 2024-07-24 NOTE — CARE PLAN
Problem: Fall/Injury  Goal: Verbalize understanding of personal risk factors for fall in the hospital  Outcome: Progressing  Goal: Verbalize understanding of risk factor reduction measures to prevent injury from fall in the home  Outcome: Progressing  Goal: Use assistive devices by end of the shift  Outcome: Progressing  Goal: Pace activities to prevent fatigue by end of the shift  Outcome: Progressing     Problem: Skin  Goal: Participates in plan/prevention/treatment measures  Outcome: Progressing  Goal: Prevent/manage excess moisture  Outcome: Progressing  Goal: Prevent/minimize sheer/friction injuries  Outcome: Progressing  Goal: Promote/optimize nutrition  Outcome: Progressing  Goal: Promote skin healing  Outcome: Progressing   The patient's goals for the shift include      The clinical goals for the shift include pt will remain pain free

## 2024-07-24 NOTE — PROGRESS NOTES
Physical Therapy                 Therapy Communication Note    Patient Name: Sweta Lim  MRN: [id number]  Today's Date: 7/24/2024     Discipline: Physical Therapy    Missed Visit Reason: Missed Visit Reason:  (RN in room about to give patient an enema)    Missed Time: Attempt    Comment:

## 2024-07-24 NOTE — PROGRESS NOTES
"Sweta Lim is a 68 y.o. female on day 5 of admission presenting with Degenerative cervical spinal stenosis.    Subjective   No acute events overnight, neck swelling appears stable, patient denies difficulty breathing       Objective     Physical Exam  Neck soft with ecchymosis that is stable/decreasing. No significant swelling  A&Ox3  Hypophonic   Face symmetric  RUE D5 B5 T5 HG/IO 5  RLE HF5 KE5 DF4 PF 5  LUE D5 B5 T5 HG/IO 5  LLE HF5 KE5 DF/PF 5  Incision displays some leakage  Cortrak in place    Last Recorded Vitals  Blood pressure 173/74, pulse 53, temperature 36.3 °C (97.3 °F), temperature source Temporal, resp. rate 17, height 1.702 m (5' 7\"), weight 76.5 kg (168 lb 10.4 oz), SpO2 96%.  Intake/Output last 3 Shifts:  I/O last 3 completed shifts:  In: 1100 (14.4 mL/kg) [I.V.:50 (0.7 mL/kg); NG/GT:1050]  Out: 150 (2 mL/kg) [Urine:150 (0.1 mL/kg/hr)]  Weight: 76.5 kg         Assessment/Plan   Principal Problem:    Degenerative cervical spinal stenosis  Active Problems:    Cervical myelopathy (Multi)    Spondylosis, cervical, with myelopathy    Hematoma of neck    Pt is a 68yF h/o HTN, HLD, COPD, DEBRA, asthma, hypothyroidism, DM2, GERD, depression, anxiety, previous C5-6 ACDF, p/w neck pain and myelopathy, found to have L C4-5 paracentral disc, s/p C4-5 ACDF     7/19 Patient with increased neck swelling concerning for hematoma overnight, on exam patient with hoarse voice and difficulty swallowing, however difficulty with airway, breathing comfortably on room air      Lateral neck xray demonstrated sig incr neck swelling compared to prior c spine xrays     7/19 s/p right neck exploration and evacuation of hematoma   7/22 Failed MBS     Plan  Floor  She failed her SLP, she is still in need of having Cortrak bridled  Nutrition recs  Follow up HTN meds as she has been hypertensive- does not appear to be on any home meds  Consult wound care for incision leak  Discontinue dex  Continue to hold ASA, ok to restart " POD14  Arrange 2 week  follow up for wound check  PTOT-SNF         Lawrence Cline MD

## 2024-07-24 NOTE — PROGRESS NOTES
LSW met with the patient at her bedside for a follow-up discussion concerning skilled nursing options. The patient and her son received a list of skilled nursing facilities yesterday and were ready to make their selections. The patient's choices are listed below in order of preference. [facility], 2. [facility], 3. [facility] and 4. [facility]. All facilities are aware that the patient has a Bridled Dobhoff.     Addendum:   [facility]  [facility].   [facility] will accept. LSW will wait to her from [place] then move forward to complete the referral transaction.   2. [facility] is reviewing.    Addendum:  [facility] will accept and have initiated precert.

## 2024-07-25 LAB
ALBUMIN SERPL BCP-MCNC: 3.6 G/DL (ref 3.4–5)
ANION GAP SERPL CALC-SCNC: 13 MMOL/L (ref 10–20)
BASOPHILS # BLD AUTO: 0.06 X10*3/UL (ref 0–0.1)
BASOPHILS NFR BLD AUTO: 0.8 %
BUN SERPL-MCNC: 18 MG/DL (ref 6–23)
CALCIUM SERPL-MCNC: 9.3 MG/DL (ref 8.6–10.6)
CHLORIDE SERPL-SCNC: 102 MMOL/L (ref 98–107)
CO2 SERPL-SCNC: 30 MMOL/L (ref 21–32)
CREAT SERPL-MCNC: 0.39 MG/DL (ref 0.5–1.05)
EGFRCR SERPLBLD CKD-EPI 2021: >90 ML/MIN/1.73M*2
EOSINOPHIL # BLD AUTO: 0.09 X10*3/UL (ref 0–0.7)
EOSINOPHIL NFR BLD AUTO: 1.3 %
ERYTHROCYTE [DISTWIDTH] IN BLOOD BY AUTOMATED COUNT: 15.1 % (ref 11.5–14.5)
GLUCOSE BLD MANUAL STRIP-MCNC: 122 MG/DL (ref 74–99)
GLUCOSE BLD MANUAL STRIP-MCNC: 147 MG/DL (ref 74–99)
GLUCOSE BLD MANUAL STRIP-MCNC: 157 MG/DL (ref 74–99)
GLUCOSE BLD MANUAL STRIP-MCNC: 160 MG/DL (ref 74–99)
GLUCOSE BLD MANUAL STRIP-MCNC: 167 MG/DL (ref 74–99)
GLUCOSE SERPL-MCNC: 137 MG/DL (ref 74–99)
HCT VFR BLD AUTO: 41.9 % (ref 36–46)
HGB BLD-MCNC: 13.5 G/DL (ref 12–16)
IMM GRANULOCYTES # BLD AUTO: 0.05 X10*3/UL (ref 0–0.7)
IMM GRANULOCYTES NFR BLD AUTO: 0.7 % (ref 0–0.9)
LYMPHOCYTES # BLD AUTO: 1.72 X10*3/UL (ref 1.2–4.8)
LYMPHOCYTES NFR BLD AUTO: 24.2 %
MAGNESIUM SERPL-MCNC: 1.99 MG/DL (ref 1.6–2.4)
MCH RBC QN AUTO: 33.8 PG (ref 26–34)
MCHC RBC AUTO-ENTMCNC: 32.2 G/DL (ref 32–36)
MCV RBC AUTO: 105 FL (ref 80–100)
MONOCYTES # BLD AUTO: 0.82 X10*3/UL (ref 0.1–1)
MONOCYTES NFR BLD AUTO: 11.5 %
NEUTROPHILS # BLD AUTO: 4.36 X10*3/UL (ref 1.2–7.7)
NEUTROPHILS NFR BLD AUTO: 61.5 %
NRBC BLD-RTO: 0 /100 WBCS (ref 0–0)
PHOSPHATE SERPL-MCNC: 3.5 MG/DL (ref 2.5–4.9)
PLATELET # BLD AUTO: 357 X10*3/UL (ref 150–450)
POTASSIUM SERPL-SCNC: 3.6 MMOL/L (ref 3.5–5.3)
RBC # BLD AUTO: 4 X10*6/UL (ref 4–5.2)
SODIUM SERPL-SCNC: 141 MMOL/L (ref 136–145)
WBC # BLD AUTO: 7.1 X10*3/UL (ref 4.4–11.3)

## 2024-07-25 PROCEDURE — 94640 AIRWAY INHALATION TREATMENT: CPT

## 2024-07-25 PROCEDURE — 2500000004 HC RX 250 GENERAL PHARMACY W/ HCPCS (ALT 636 FOR OP/ED)

## 2024-07-25 PROCEDURE — 2500000001 HC RX 250 WO HCPCS SELF ADMINISTERED DRUGS (ALT 637 FOR MEDICARE OP): Performed by: STUDENT IN AN ORGANIZED HEALTH CARE EDUCATION/TRAINING PROGRAM

## 2024-07-25 PROCEDURE — C9113 INJ PANTOPRAZOLE SODIUM, VIA: HCPCS

## 2024-07-25 PROCEDURE — 97530 THERAPEUTIC ACTIVITIES: CPT | Mod: GP,CQ

## 2024-07-25 PROCEDURE — 97116 GAIT TRAINING THERAPY: CPT | Mod: GP,CQ

## 2024-07-25 PROCEDURE — 36415 COLL VENOUS BLD VENIPUNCTURE: CPT | Performed by: STUDENT IN AN ORGANIZED HEALTH CARE EDUCATION/TRAINING PROGRAM

## 2024-07-25 PROCEDURE — 97110 THERAPEUTIC EXERCISES: CPT | Mod: GP,CQ

## 2024-07-25 PROCEDURE — 82947 ASSAY GLUCOSE BLOOD QUANT: CPT

## 2024-07-25 PROCEDURE — 2500000002 HC RX 250 W HCPCS SELF ADMINISTERED DRUGS (ALT 637 FOR MEDICARE OP, ALT 636 FOR OP/ED): Performed by: STUDENT IN AN ORGANIZED HEALTH CARE EDUCATION/TRAINING PROGRAM

## 2024-07-25 PROCEDURE — 1100000001 HC PRIVATE ROOM DAILY

## 2024-07-25 PROCEDURE — 97535 SELF CARE MNGMENT TRAINING: CPT | Mod: GO

## 2024-07-25 PROCEDURE — 85025 COMPLETE CBC W/AUTO DIFF WBC: CPT | Performed by: STUDENT IN AN ORGANIZED HEALTH CARE EDUCATION/TRAINING PROGRAM

## 2024-07-25 PROCEDURE — 97530 THERAPEUTIC ACTIVITIES: CPT | Mod: GO

## 2024-07-25 PROCEDURE — 2500000004 HC RX 250 GENERAL PHARMACY W/ HCPCS (ALT 636 FOR OP/ED): Performed by: PHYSICIAN ASSISTANT

## 2024-07-25 PROCEDURE — 84100 ASSAY OF PHOSPHORUS: CPT | Performed by: STUDENT IN AN ORGANIZED HEALTH CARE EDUCATION/TRAINING PROGRAM

## 2024-07-25 PROCEDURE — 83735 ASSAY OF MAGNESIUM: CPT | Performed by: STUDENT IN AN ORGANIZED HEALTH CARE EDUCATION/TRAINING PROGRAM

## 2024-07-25 ASSESSMENT — PAIN DESCRIPTION - DESCRIPTORS: DESCRIPTORS: BURNING;ACHING

## 2024-07-25 ASSESSMENT — PAIN DESCRIPTION - LOCATION
LOCATION: THROAT
LOCATION: NECK
LOCATION: THROAT

## 2024-07-25 ASSESSMENT — COGNITIVE AND FUNCTIONAL STATUS - GENERAL
MOBILITY SCORE: 17
CLIMB 3 TO 5 STEPS WITH RAILING: A LITTLE
DAILY ACTIVITIY SCORE: 20
DRESSING REGULAR LOWER BODY CLOTHING: A LITTLE
MOVING TO AND FROM BED TO CHAIR: A LITTLE
TURNING FROM BACK TO SIDE WHILE IN FLAT BAD: A LITTLE
MOBILITY SCORE: 21
DAILY ACTIVITIY SCORE: 20
HELP NEEDED FOR BATHING: A LITTLE
DRESSING REGULAR LOWER BODY CLOTHING: A LITTLE
WALKING IN HOSPITAL ROOM: A LITTLE
TOILETING: A LITTLE
WALKING IN HOSPITAL ROOM: A LITTLE
HELP NEEDED FOR BATHING: A LITTLE
STANDING UP FROM CHAIR USING ARMS: A LITTLE
TURNING FROM BACK TO SIDE WHILE IN FLAT BAD: A LITTLE
TOILETING: A LITTLE
PERSONAL GROOMING: A LITTLE
MOVING FROM LYING ON BACK TO SITTING ON SIDE OF FLAT BED WITH BEDRAILS: A LITTLE
PERSONAL GROOMING: A LITTLE
CLIMB 3 TO 5 STEPS WITH RAILING: A LOT

## 2024-07-25 ASSESSMENT — PAIN SCALES - GENERAL
PAINLEVEL_OUTOF10: 4
PAINLEVEL_OUTOF10: 6
PAINLEVEL_OUTOF10: 7

## 2024-07-25 ASSESSMENT — PAIN DESCRIPTION - ORIENTATION: ORIENTATION: ANTERIOR

## 2024-07-25 ASSESSMENT — PAIN - FUNCTIONAL ASSESSMENT
PAIN_FUNCTIONAL_ASSESSMENT: 0-10
PAIN_FUNCTIONAL_ASSESSMENT: 0-10

## 2024-07-25 ASSESSMENT — ACTIVITIES OF DAILY LIVING (ADL): HOME_MANAGEMENT_TIME_ENTRY: 15

## 2024-07-25 NOTE — PROGRESS NOTES
Occupational Therapy    Occupational Therapy Treatment    Name: Sewta Lim  MRN: [id number]  : 1955  Date: 24  Room: [address] [place], [place] [address]      Time Calculation  Start Time: 1500  Stop Time: 1539  Time Calculation (min): 39 min    Assessment:  OT Assessment: Pt demos progress towards goals, however continues to require assist w/ self care and functional mobiity/transfers. OT continues to recommend Mod intensity services prior to d/c home to allow for a safe and functional d/c to the least restrictive living environment.  End of Session Communication: Bedside nurse  End of Session Patient Position: Bed, 3 rail up, Alarm off, not on at start of session  Plan:  Treatment Interventions: ADL retraining, Functional transfer training, UE strengthening/ROM, Endurance training, Cognitive reorientation, Patient/family training, Equipment evaluation/education, Compensatory technique education  OT Frequency: 3 times per week  OT Discharge Recommendations: Moderate intensity level of continued care  OT Recommended Transfer Status: Assist of 1  OT - OK to Discharge: Yes (upon medical clearance)    Subjective   General:  OT Last Visit  OT Received On: 24  Reason for Referral: Degenerative cervical spinal stenosis, Spondylosis, cervical, with myelopathy s/p C4-5 Anterior cervical disectomy fusion  Past Medical History Relevant to Rehab: HTN, HLD, COPD, DEBRA, asthma, hypothyroidism, DM2, GERD, depression, anxiety, previous C5-6 ACDF  Prior to Session Communication: Bedside nurse  Patient Position Received: Bed, 3 rail up, Alarm off, not on at start of session  Family/Caregiver Present: No  General Comment: RN clears pt for therapy. Pt pleasant and agreeable to OT session   Precautions:  Hearing/Visual Limitations: [place]  Medical Precautions: Fall precautions  Post-Surgical Precautions: Spinal precautions  Precautions Comment: NPO- confirm w/ RN prior to providing food/liquids  Vitals:  Vital Signs  Heart Rate: 86  SpO2: 90  %  Patient Position: Other (Comment) (s/p functional mobility around unit. EDU on PLB w/ pt able to raise SpO2 to 96%)  Lines/Tubes/Drains:  NG/OG/Feeding Tube Other (Comment) Right nostril (Active)   Number of days: 4       Cognition:  Overall Cognitive Status: Within Functional Limits    Pain Assessment:  Pain Assessment  Pain Assessment: 0-10  0-10 (Numeric) Pain Score: 6  Pain Type: Acute pain, Surgical pain  Pain Location: Ear (radiates from surgical incision to bilateral ears)     Objective   Activities of Daily Living:     Grooming  Grooming Level of Assistance: Close supervision  Grooming Where Assessed: Standing sinkside  Grooming Comments: Washing hands standing at sink w/ close supervision needed to decreased safety awareness    UE Dressing  UE Dressing Level of Assistance: Independent  UE Dressing Where Assessed: Edge of bed  UE Dressing Comments: A for line management    Toileting  Toileting Level of Assistance: Close supervision  Where Assessed: Toilet  Toileting Comments: Pt performs toileting in bathroom w/ pt able to manage clothing (no undergarments) and rochelle hygigne w/o assist    Functional Standing Tolerance:  Functional Mobility  Functional Mobility Performed: Yes  Functional Mobility 1  Surface 1: Level tile, Carpet  Device 1: Rolling walker  Assistance 1: Contact guard  Comments 1: Mod household distance from bed around unit and back to room w/ 1 seated rest break. Verbal cues for walker management and safety awareness. No LOB noted  Bed Mobility/Transfers:   Bed Mobility  Bed Mobility: Yes  Bed Mobility 1  Bed Mobility 1: Supine to sitting, Sitting to supine  Level of Assistance 1: Distant supervision  Bed Mobility Comments 1: EOB elevated VC to maintain Spinal precautions    Transfers  Transfer: Yes  Transfer 1  Transfer From 1: Sit to  Transfer to 1: Stand  Technique 1: Sit to stand  Transfer Device 1: Walker  Transfer Level of Assistance 1: Close supervision  Trials/Comments 1: x3 w/ VC  "for safety awareness as pt reports light headedness immediately following stand that does lessen w/ time. EDU on OH and management techniques. Pt able to verbalize techniques following repeated edu throughout session. Pt is noted to be impulsive w/ STS.  Transfers 2  Transfer From 2: Stand to  Transfer to 2: Sit  Technique 2: Stand to sit  Transfer Device 2: Walker  Trials/Comments 2: VC for safety awareness w/ little contreras over. Pt pops on seats and does not reach back for seat. Continued edu required    Toilet Transfers  Toilet Transfer From: Walker  Toilet Transfer Type: To and from  Toilet Transfer to: Standard toilet  Toilet Transfer Technique: Ambulating  Toilet Transfers: Contact guard  Toilet Transfers Comments: Close Sup-CGA required d/t line management, decreased safety awareness and unstediness     Toilet Transfers  Toilet Transfer From: Walker  Toilet Transfer Type: To and from  Toilet Transfer to: Standard toilet  Toilet Transfer Technique: Ambulating  Toilet Transfers: Contact guard  Toilet Transfers Comments: Close Sup-CGA required d/t line management, decreased safety awareness and unstediness    Balance:  Static Sitting Balance  Static Sitting-Balance Support: Feet supported  Static Sitting-Level of Assistance: Independent  Static Standing Balance  Static Standing-Balance Support: Bilateral upper extremity supported  Static Standing-Level of Assistance: Close supervision  Static Standing-Comment/Number of Minutes: w/ FWW  Therapy/Activity:      Therapeutic Activity  Therapeutic Activity Performed: Yes  Therapeutic Activity 1: Pt becomes emotional during session stating \"I guess I'm feeling sorry for myself.\" Theraputic use of self to help pt w/ perspective on her current physical status and the amount of progress she has made thus far. EDU on use of STGs to change her perspective on her progress. Pt very receptive to EDU and demos an immediate improvement in mood following session.     Outcome " Measures:  [facility] Daily Activity  Putting on and taking off regular lower body clothing: A little  Bathing (including washing, rinsing, drying): A little  Putting on and taking off regular upper body clothing: None  Toileting, which includes using toilet, bedpan or urinal: A little  Taking care of personal grooming such as brushing teeth: A little  Eating Meals: None  Daily Activity - Total Score: 20     Education Documentation  Body Mechanics, taught by Rut Austin OT at 7/25/2024  4:12 PM.  Learner: Patient  Readiness: Acceptance  Method: Explanation  Response: Verbalizes Understanding, Needs Reinforcement    Precautions, taught by Rut Austin OT at 7/25/2024  4:12 PM.  Learner: Patient  Readiness: Acceptance  Method: Explanation  Response: Verbalizes Understanding, Needs Reinforcement    ADL Training, taught by Rut Austin OT at 7/25/2024  4:12 PM.  Learner: Patient  Readiness: Acceptance  Method: Explanation  Response: Verbalizes Understanding, Needs Reinforcement    Education Comments  No comments found.        Goals:  Encounter Problems       Encounter Problems (Active)       ADLs       Patient with complete lower body dressing with modified independent level of assistance donning and doffing all LE clothes  with PRN adaptive equipment while supported sitting and standing (Progressing)       Start:  07/18/24    Expected End:  08/08/24            Patient will complete toileting including hygiene clothing management/hygiene with modified independent level of assistance and grab bars. (Progressing)       Start:  07/18/24    Expected End:  08/08/24               BALANCE       Pt will maintain dynamic standing balance during ADL task with modified independent level of assistance in order to demonstrate decreased risk of falling and improved postural control. (Progressing)       Start:  07/18/24    Expected End:  08/08/24               COGNITION/SAFETY       Patient will recall and adhere to spine  precautions during all functional mobility/ADL tasks in order to demonstrate improved understanding and promote healing post op (Progressing)       Start:  07/18/24    Expected End:  08/08/24            Patient will score WFL on standardized cognitive assessment with verbal cues and within reasonable time frame (Progressing)       Start:  07/18/24    Expected End:  08/08/24               MOBILITY       Patient will perform Functional mobility max Household distances/Community Distances with modified independent level of assistance and least restrictive device in order to improve safety and functional mobility. (Progressing)       Start:  07/18/24    Expected End:  08/08/24               TRANSFERS       Patient will complete sit to stand transfer with modified independent level of assistance and least restrictive device in order to improve safety and prepare for out of bed mobility. (Progressing)       Start:  07/18/24    Expected End:  08/08/24 07/25/24 at 4:14 PM   JETT GUNN, OT   [phone]

## 2024-07-25 NOTE — PROGRESS NOTES
Physical Therapy    Physical Therapy Treatment    Patient Name: Sweta Lim  MRN: [id number]  Today's Date: 7/25/2024  Room: [address] [place] [address]  Time Calculation  Start Time: 0840  Stop Time: 0918  Time Calculation (min): 38 min       Assessment/Plan   PT Assessment  PT Assessment Results: Decreased strength, Decreased endurance, Impaired balance, Decreased mobility, Decreased cognition, Decreased safety awareness, Impaired judgement  Rehab Prognosis: Good  Evaluation/Treatment Tolerance: Patient tolerated treatment well  Medical Staff Made Aware: Yes  Strengths: Attitude of self  Barriers to Participation: Support of Caregivers  End of Session Communication:  (respiratory therapist)  End of Session Patient Position: Up in chair (with respiratory therapist)     PT Plan  Treatment/Interventions: Bed mobility, Transfer training, Gait training, Stair training, Strengthening, Endurance training, Therapeutic exercise, Therapeutic activity  PT Plan: Ongoing PT  PT Frequency: Daily  PT Discharge Recommendations: Moderate intensity level of continued care  PT Recommended Transfer Status: Assist x1, Assistive device  PT - OK to Discharge: Yes  Assessment: Patient is progressing Well with therapy this date. Pt has decreased safety awareness and limited attention span/disorganized thoughts, but progressing with ambulation well. Would continue to benefit from continued skilled PT to address all mobility deficits; Patient remains appropriate for MOD intensity therapy when medically appropriate for discharge from acute stay.  Will continue to follow.     General Visit Information:   PT  Visit  PT Received On: 07/25/24  Prior to Session Communication: Bedside nurse  Patient Position Received: Bed, 3 rail up, Alarm off, not on at start of session     Subjective   Subjective: Pt pleasant but overall emotional from recent events being in the hospital. Reports many symptoms including upset stomach, neck discomfort (posterior), and  occasional dizziness  Precautions:  Precautions  Hearing/Visual Limitations: [place] (does not have hearing aids at hospital)  Medical Precautions: Fall precautions  Post-Surgical Precautions: Spinal precautions  Vital Signs:       Objective   Pain:  Pain Assessment  Pain Assessment: 0-10  0-10 (Numeric) Pain Score: 4  Pain Type: Surgical pain  Pain Location: Throat  Pain Descriptors: Burning, Aching  Pain Frequency: Constant/continuous  Pain Interventions: Medication (See MAR)  Cognition:  Cognition  Orientation Level: Oriented X4  Following Commands: Follows one step commands with repetition  Safety Judgment: Decreased awareness of need for safety precautions  Cognition Comments: Pt easily distracted, needs repetitive cues. Overall decreased safety awareness  Insight: Mild  Impulsive: Mildly     Static Sitting balance:  Static Sitting Balance  Static Sitting-Balance Support: Feet supported  Static Sitting-Level of Assistance: Distant supervision  Static Sitting-Comment/Number of Minutes: 15m      Lines/Tubes/Drains:  NG/OG/Feeding Tube Other (Comment) Right nostril (Active)   Number of days: 4     PT Treatments:  Therapeutic Exercise  Therapeutic Exercise Activity 1: standing BLE therex 12x marches/mini squats @ SBA with vc/visual demo for proper technique  Therapeutic Activity  Therapeutic Activity 1: Reviewed and educated pt on (cervical) spinal precautions. Pt voices understanding however noncompliant throughout session requiring frequent cues to avoid excessive rotation     Bed Mobility 1  Bed Mobility 1: Supine to sitting  Level of Assistance 1: Close supervision  Bed Mobility Comments 1: cues for log roll  Ambulation/Gait Training 1  Surface 1: Level tile  Device 1: Rolling walker  Assistance 1: Contact guard, Moderate verbal cues  Quality of Gait 1: Narrow base of support, Inconsistent stride length, Decreased step length, Shuffling gait, Forward flexed posture (R foot drop)  Comments/Distance (ft) 1: 125'x2,  vc for safety with obstacle negotiation, proper distance from fww and maintaining spinal precautions. Poor short term memory  Transfer 1  Transfer From 1: Sit to  Transfer to 1: Stand  Transfer Device 1: Walker  Transfer Level of Assistance 1: Close supervision  Trials/Comments 1: x3, vc for handplacement    Activity tolerance:  Activity Tolerance  Endurance: Tolerates 10 - 20 min exercise with multiple rests    Outcome Measures:  [facility] Basic Mobility  Turning from your back to your side while in a flat bed without using bedrails: A little  Moving from lying on your back to sitting on the side of a flat bed without using bedrails: A little  Moving to and from bed to chair (including a wheelchair): A little  Standing up from a chair using your arms (e.g. wheelchair or bedside chair): A little  To walk in hospital room: A little  Climbing 3-5 steps with railing: A lot  Basic Mobility - Total Score: 17      Education Documentation  Precautions, taught by Estephania Velazquez PTA at 7/25/2024  9:33 AM.  Learner: Patient  Readiness: Acceptance  Method: Explanation  Response: Verbalizes Understanding, Needs Reinforcement    Body Mechanics, taught by Estephania Velazquez PTA at 7/25/2024  9:33 AM.  Learner: Patient  Readiness: Acceptance  Method: Explanation  Response: Verbalizes Understanding, Needs Reinforcement    Mobility Training, taught by Estephania Velazquez PTA at 7/25/2024  9:33 AM.  Learner: Patient  Readiness: Acceptance  Method: Explanation  Response: Verbalizes Understanding, Needs Reinforcement    Handouts, taught by Estephania Velazquez PTA at 7/25/2024  9:33 AM.  Learner: Patient  Readiness: Acceptance  Method: Explanation  Response: Verbalizes Understanding, Needs Reinforcement    Body Mechanics, taught by Estephania Velazquez PTA at 7/25/2024  9:33 AM.  Learner: Patient  Readiness: Acceptance  Method: Explanation  Response: Verbalizes Understanding, Needs Reinforcement    Precautions, taught by Estephania Velazquez PTA at 7/25/2024  9:33 AM.  Learner:  Patient  Readiness: Acceptance  Method: Explanation  Response: Verbalizes Understanding, Needs Reinforcement    ADL Training, taught by Estephania Velazquez PTA at 7/25/2024  9:33 AM.  Learner: Patient  Readiness: Acceptance  Method: Explanation  Response: Verbalizes Understanding, Needs Reinforcement    Education Comments  No comments found.          OP EDUCATION:       Encounter Problems       Encounter Problems (Active)       Balance       Pt will score >/=24/28 on Tinetti to demonstrate decreased falls risk (Progressing)       Start:  07/18/24    Expected End:  08/01/24               Mobility       Pt will be SBA to ascend/descend 4 steps with LRAD and 7 steps with 1 handrail (Progressing)       Start:  07/18/24    Expected End:  08/01/24            Pt will be SBA ambulation 100 ft with RW (Progressing)       Start:  07/18/24    Expected End:  08/01/24               PT Transfers       Pt will be SBA for sit to stand and bed to chair transfers with RW (Progressing)       Start:  07/18/24    Expected End:  08/01/24            Pt will be [place] with bed mobility (Progressing)       Start:  07/18/24    Expected End:  08/01/24               Pain - Adult

## 2024-07-25 NOTE — PROGRESS NOTES
"Sweta Lim is a 68 y.o. female on day 6 of admission presenting with Degenerative cervical spinal stenosis.    Subjective   No acute events overnight, neck swelling appears stable, patient denies difficulty breathing, in good spirits       Objective     Physical Exam  Neck soft with ecchymosis that is stable/decreasing. No significant swelling  A&Ox3  Hypophonic   Face symmetric  RUE D5 B5 T5 HG/IO 5  RLE HF5 KE5 DF4 PF 5  LUE D5 B5 T5 HG/IO 5  LLE HF5 KE5 DF/PF 5  Cortrak in place    Last Recorded Vitals  Blood pressure 146/67, pulse 63, temperature 36 °C (96.8 °F), temperature source Temporal, resp. rate 18, height 1.702 m (5' 7\"), weight 76.5 kg (168 lb 10.4 oz), SpO2 96%.  Intake/Output last 3 Shifts:  I/O last 3 completed shifts:  In: 1270 (16.6 mL/kg) [NG/GT:1270]  Out: - (0 mL/kg)   Weight: 76.5 kg         Assessment/Plan   Principal Problem:    Degenerative cervical spinal stenosis  Active Problems:    Cervical myelopathy (Multi)    Spondylosis, cervical, with myelopathy    Hematoma of neck    Pt is a 68yF h/o HTN, HLD, COPD, DEBRA, asthma, hypothyroidism, DM2, GERD, depression, anxiety, previous C5-6 ACDF, p/w neck pain and myelopathy, found to have L C4-5 paracentral disc, s/p C4-5 ACDF     7/19 Patient with increased neck swelling concerning for hematoma overnight, on exam patient with hoarse voice and difficulty swallowing, however difficulty with airway, breathing comfortably on room air      Lateral neck xray demonstrated sig incr neck swelling compared to prior c spine xrays     7/19 s/p right neck exploration and evacuation of hematoma   7/22 Failed MBS     Plan  Floor  Has bridled Cortrak  Nutrition recs  Follow up HTN meds as she has been hypertensive- does not appear to be on any home meds  Continue to hold ASA, ok to restart POD14  Arrange 2 week  follow up for wound check  PTOT-SNF         Lawrence Cline MD      "

## 2024-07-25 NOTE — CARE PLAN
The patient's goals for the shift include  safety    The clinical goals for the shift include patient will increase mobility      Problem: Fall/Injury  Goal: Verbalize understanding of personal risk factors for fall in the hospital  Outcome: Progressing  Goal: Verbalize understanding of risk factor reduction measures to prevent injury from fall in the home  Outcome: Progressing  Goal: Use assistive devices by end of the shift  Outcome: Progressing  Goal: Pace activities to prevent fatigue by end of the shift  Outcome: Progressing  Goal: Not fall by end of shift  Outcome: Progressing  Goal: Be free from injury by end of the shift  Outcome: Progressing     Problem: Skin  Goal: Participates in plan/prevention/treatment measures  Outcome: Progressing  Goal: Prevent/manage excess moisture  Outcome: Progressing  Goal: Prevent/minimize sheer/friction injuries  Outcome: Progressing  Goal: Promote/optimize nutrition  Outcome: Progressing  Goal: Promote skin healing  Outcome: Progressing     Problem: Discharge Planning  Goal: Discharge to home or other facility with appropriate resources  Outcome: Progressing     Problem: Chronic Conditions and Co-morbidities  Goal: Patient's chronic conditions and co-morbidity symptoms are monitored and maintained or improved  Outcome: Progressing     Problem: Diabetes  Goal: Achieve decreasing blood glucose levels by end of shift  Outcome: Progressing  Goal: Increase stability of blood glucose readings by end of shift  Outcome: Progressing  Goal: Decrease in ketones present in urine by end of shift  Outcome: Progressing  Goal: Maintain electrolyte levels within acceptable range throughout shift  Outcome: Progressing  Goal: Maintain glucose levels >70mg/dl to <250mg/dl throughout shift  Outcome: Progressing  Goal: No changes in neurological exam by end of shift  Outcome: Progressing  Goal: Learn about and adhere to nutrition recommendations by end of shift  Outcome: Progressing  Goal: Vital  signs within normal range for age by end of shift  Outcome: Progressing  Goal: Increase self care and/or family involovement by end of shift  Outcome: Progressing  Goal: Receive DSME education by end of shift  Outcome: Progressing     Problem: Pain  Goal: Takes deep breaths with improved pain control throughout the shift  Outcome: Progressing  Goal: Turns in bed with improved pain control throughout the shift  Outcome: Progressing  Goal: Walks with improved pain control throughout the shift  Outcome: Progressing  Goal: Performs ADL's with improved pain control throughout shift  Outcome: Progressing  Goal: Participates in PT with improved pain control throughout the shift  Outcome: Progressing  Goal: Free from opioid side effects throughout the shift  Outcome: Progressing  Goal: Free from acute confusion related to pain meds throughout the shift  Outcome: Progressing

## 2024-07-26 VITALS
DIASTOLIC BLOOD PRESSURE: 84 MMHG | SYSTOLIC BLOOD PRESSURE: 169 MMHG | RESPIRATION RATE: 18 BRPM | HEIGHT: 67 IN | BODY MASS INDEX: 26.47 KG/M2 | OXYGEN SATURATION: 94 % | TEMPERATURE: 98.1 F | WEIGHT: 168.65 LBS | HEART RATE: 72 BPM

## 2024-07-26 LAB
GLUCOSE BLD MANUAL STRIP-MCNC: 158 MG/DL (ref 74–99)
GLUCOSE BLD MANUAL STRIP-MCNC: 163 MG/DL (ref 74–99)
GLUCOSE BLD MANUAL STRIP-MCNC: 166 MG/DL (ref 74–99)

## 2024-07-26 PROCEDURE — 2500000001 HC RX 250 WO HCPCS SELF ADMINISTERED DRUGS (ALT 637 FOR MEDICARE OP): Performed by: STUDENT IN AN ORGANIZED HEALTH CARE EDUCATION/TRAINING PROGRAM

## 2024-07-26 PROCEDURE — 2500000004 HC RX 250 GENERAL PHARMACY W/ HCPCS (ALT 636 FOR OP/ED)

## 2024-07-26 PROCEDURE — 99239 HOSP IP/OBS DSCHRG MGMT >30: CPT | Performed by: NURSE PRACTITIONER

## 2024-07-26 PROCEDURE — 2500000002 HC RX 250 W HCPCS SELF ADMINISTERED DRUGS (ALT 637 FOR MEDICARE OP, ALT 636 FOR OP/ED): Performed by: STUDENT IN AN ORGANIZED HEALTH CARE EDUCATION/TRAINING PROGRAM

## 2024-07-26 PROCEDURE — 2500000004 HC RX 250 GENERAL PHARMACY W/ HCPCS (ALT 636 FOR OP/ED): Performed by: PHYSICIAN ASSISTANT

## 2024-07-26 PROCEDURE — C9113 INJ PANTOPRAZOLE SODIUM, VIA: HCPCS

## 2024-07-26 PROCEDURE — 82947 ASSAY GLUCOSE BLOOD QUANT: CPT

## 2024-07-26 RX ORDER — POLYETHYLENE GLYCOL 3350 17 G/17G
17 POWDER, FOR SOLUTION ORAL 3 TIMES DAILY
Start: 2024-07-26

## 2024-07-26 RX ORDER — CYCLOBENZAPRINE HCL 10 MG
10 TABLET ORAL 3 TIMES DAILY PRN
Start: 2024-07-26

## 2024-07-26 RX ORDER — AMOXICILLIN 250 MG
2 CAPSULE ORAL 2 TIMES DAILY
Start: 2024-07-26

## 2024-07-26 RX ORDER — METHOTREXATE 2.5 MG/1
20 TABLET ORAL
Start: 2024-07-28

## 2024-07-26 RX ORDER — BISACODYL 10 MG/1
10 SUPPOSITORY RECTAL DAILY PRN
Start: 2024-07-26

## 2024-07-26 RX ORDER — HEPARIN SODIUM 5000 [USP'U]/ML
5000 INJECTION, SOLUTION INTRAVENOUS; SUBCUTANEOUS EVERY 8 HOURS
Start: 2024-07-26

## 2024-07-26 RX ORDER — INSULIN DEGLUDEC 100 U/ML
15 INJECTION, SOLUTION SUBCUTANEOUS NIGHTLY
Start: 2024-07-26

## 2024-07-26 RX ORDER — OXYCODONE HYDROCHLORIDE 5 MG/1
5 TABLET ORAL EVERY 6 HOURS PRN
Qty: 28 TABLET | Refills: 0 | Status: SHIPPED | OUTPATIENT
Start: 2024-07-26 | End: 2024-08-02

## 2024-07-26 RX ORDER — BUSPIRONE HYDROCHLORIDE 10 MG/1
TABLET ORAL
Qty: 450 TABLET | Refills: 1 | Status: SHIPPED | OUTPATIENT
Start: 2024-07-26

## 2024-07-26 RX ORDER — ACETAMINOPHEN 325 MG/1
650 TABLET ORAL EVERY 6 HOURS PRN
Start: 2024-07-26

## 2024-07-26 ASSESSMENT — COGNITIVE AND FUNCTIONAL STATUS - GENERAL
MOVING TO AND FROM BED TO CHAIR: A LITTLE
DRESSING REGULAR UPPER BODY CLOTHING: A LITTLE
TURNING FROM BACK TO SIDE WHILE IN FLAT BAD: A LITTLE
MOBILITY SCORE: 19
CLIMB 3 TO 5 STEPS WITH RAILING: A LITTLE
TOILETING: A LITTLE
WALKING IN HOSPITAL ROOM: A LITTLE
WALKING IN HOSPITAL ROOM: A LITTLE
DRESSING REGULAR LOWER BODY CLOTHING: A LITTLE
STANDING UP FROM CHAIR USING ARMS: A LITTLE
MOBILITY SCORE: 20
HELP NEEDED FOR BATHING: A LITTLE
PERSONAL GROOMING: A LITTLE
EATING MEALS: A LITTLE
DAILY ACTIVITIY SCORE: 18
MOVING TO AND FROM BED TO CHAIR: A LITTLE
STANDING UP FROM CHAIR USING ARMS: A LITTLE
CLIMB 3 TO 5 STEPS WITH RAILING: A LITTLE

## 2024-07-26 ASSESSMENT — PAIN DESCRIPTION - LOCATION: LOCATION: HEAD

## 2024-07-26 ASSESSMENT — PAIN SCALES - WONG BAKER: WONGBAKER_NUMERICALRESPONSE: NO HURT

## 2024-07-26 ASSESSMENT — PAIN SCALES - GENERAL
PAINLEVEL_OUTOF10: 0 - NO PAIN
PAINLEVEL_OUTOF10: 0 - NO PAIN
PAINLEVEL_OUTOF10: 5 - MODERATE PAIN

## 2024-07-26 ASSESSMENT — PAIN - FUNCTIONAL ASSESSMENT: PAIN_FUNCTIONAL_ASSESSMENT: 0-10

## 2024-07-26 NOTE — DISCHARGE SUMMARY
Discharge Diagnosis  Degenerative cervical spinal stenosis    Issues Requiring Follow-Up  none    Test Results Pending At Discharge  Pending Labs       No current pending labs.            Hospital Course  Sweta Lim is a 68 year old female with history of HTN, HLD, COPD, DEBRA, asthma, hypothyroidism, DM2, GERD, depression, anxiety, previous C5-6 ACDF, neck pain and myelopathy, found to have L C4-5 paracentral disc who presented for scheduled surgery.    7/16 s/p C4-5 ACDF  7/18 uprights with hardware in appropriate position   7/19 patient with increased neck swelling concerning for hematoma overnight, on exam patient with hoarse voice and difficulty swallowing, however difficulty with airway, breathing comfortably on room air, lateral neck xray demonstrated sig incr neck swelling compared to prior c spine xrays  7/19 s/p right neck exploration and evacuation of hematoma   7/20 SLP evaluation, recommended NPO, Cortrak placed   7/22 repeat SLP evaluation/MBS failed  7/23 small area of serosanguinous incisional leakage noted, wound care consulted and daily dressing changes recommended     PT/OT eval recommended SNF placement    Discharged with detailed instructions and scheduled outpatient follow up        Pertinent Physical Exam At Time of Discharge  Physical Exam  General:  in bed, awake, no distress  HEENT: normocephalic; TANYA; bridled feeding tube in place to nares; tongue midline  Cardiac: S1 & S2 regular  Resp: equal chest expansion, lungs clear bilaterally  Abd: BS+ x4, soft, non-tender  Extr: no edema, pulses palpable x4  Neuro: A&Ox3, hypophonic, follows commands, FAYE's, RUE D5 B5 T5 HG/IO 5  RLE HF5 KE5 DF4 PF 5  LUE D5 B5 T5 HG/IO 5  LLE HF5 KE5 DF/PF 5  Skin: neck incision clean, dry, intact  Psyche: calm, cooperative    Home Medications     Medication List      START taking these medications     acetaminophen 325 mg tablet; Commonly known as: Tylenol; 2 tablets (650   mg) by nasogastric tube route every 6  hours if needed for mild pain (1 -   3).   bisacodyl 10 mg suppository; Commonly known as: Dulcolax; Insert 1   suppository (10 mg) into the rectum once daily as needed for constipation.   cyclobenzaprine 10 mg tablet; Commonly known as: Flexeril; Take 1 tablet   (10 mg) by mouth 3 times a day as needed for muscle spasms. Via   nasogastric tube   heparin (porcine) 5,000 unit/mL injection; Inject 1 mL (5,000 Units)   under the skin every 8 hours. Continue until ambulatory   oxyCODONE 5 mg immediate release tablet; Commonly known as: Roxicodone;   1 tablet (5 mg) by nasogastric tube route every 6 hours if needed for   severe pain (7 - 10) for up to 7 days.   polyethylene glycol 17 gram packet; Commonly known as: Glycolax,   Miralax; 17 g by nasogastric tube route 3 times a day.   sennosides-docusate sodium 8.6-50 mg tablet; Commonly known as:   Kendal-Colace; 2 tablets by nasogastric tube route 2 times a day.     CHANGE how you take these medications     escitalopram 20 mg tablet; Commonly known as: Lexapro; Take 1 tablet (20   mg) by mouth once daily.; What changed: Another medication with the same   name was removed. Continue taking this medication, and follow the   directions you see here.   insulin degludec 100 unit/mL (3 mL) injection; Commonly known as:   Tresiba FlexTouch; Inject 15 Units under the skin once daily at bedtime.   Take as directed per insulin instructions.; What changed: See the new   instructions.   methotrexate 2.5 mg tablet; Commonly known as: Trexall; Take 8 tablets   (20 mg total) by mouth 1 (one) time per week.  DO NOT RESUME MEDICATION   UNTIL DISCUSSED AT FOLLOW UP APPOINTMENT WITH NEUROSURGERY  Follow   directions carefully, and ask to explain any part you do not understand.   Take exactly as directed.; Start taking on: July 28, 2024; What changed:   See the new instructions.     CONTINUE taking these medications     alendronate 70 mg tablet; Commonly known as: Fosamax   busPIRone 10 mg  tablet; Commonly known as: Buspar; 2 tablets each   morning and 1 tablet each afternoon and 2 tablets each evening   clonazePAM 1 mg tablet; Commonly known as: KlonoPIN; 1/2 tablet daily   each morning and 1 tablet daily each night , this is a reduced dose   colchicine 0.6 mg tablet   folic acid 1 mg tablet; Commonly known as: Folvite   FreeStyle Lancets 28 gauge; Generic drug: FreeStyle lancets   FreeStyle Test strip; Generic drug: blood sugar diagnostic   levothyroxine 75 mcg tablet; Commonly known as: Synthroid, Levoxyl   omeprazole 40 mg DR capsule; Commonly known as: PriLOSEC; TAKE 1 CAPSULE   BY MOUTH TWICE A DAY   primidone 50 mg tablet; Commonly known as: Mysoline   traZODone 50 mg tablet; Commonly known as: Desyrel; TAKE 1 -2 TABLETS AT   BEDTIME   Trelegy Ellipta 100-62.5-25 mcg blister with device; Generic drug:   fluticasone-umeclidin-vilanter   Zetia 10 mg tablet; Generic drug: ezetimibe     STOP taking these medications     chlorhexidine 0.12 % solution; Commonly known as: Peridex   chlorhexidine 4 % external liquid; Commonly known as: Hibiclens   nicotine 7 mg/24 hr patch; Commonly known as: Nicoderm CQ   nicotine polacrilex 2 mg lozenge; Commonly known as: Nicorette   nicotine polacrilex 4 mg lozenge; Commonly known as: Commit     ASK your doctor about these medications     simvastatin 40 mg tablet; Commonly known as: Zocor; TAKE 1 TABLET BY   MOUTH EVERY DAY     I spent a total of 35 minutes with the patient and family and greater than 50% was spent in counseling and coordination of care.   Outpatient Follow-Up  Future Appointments   Date Time Provider Department Center   8/7/2024 10:00 AM RENETTA Peterson-CNP [id number] [place]   8/14/2024  8:00 AM Jessica Vasquez, PhD [id number] [place]   9/3/2024  3:00 PM RENETTA Hathaway-ELVIE [id number] [place]   10/8/2024  9:30 AM Nick Crump MD PhD [id number] [place]   10/16/2024 10:40 AM Jonathan Andrews DO [id number] [place]   11/4/2024  3:00 PM Leah REYES  Viviane APRN-CNP [id number] Ronald Castillo APRN-CNP

## 2024-07-26 NOTE — PROGRESS NOTES
Physical Therapy    Physical Therapy Treatment    Patient Name: Sweta Lim  MRN: [id number]  Today's Date: 7/26/2024  Room: [address] [place] [address]  Time Calculation  Start Time: 1209  Stop Time: 1248  Time Calculation (min): 39 min       Assessment/Plan   PT Assessment  PT Assessment Results: Decreased strength, Decreased endurance, Impaired balance, Decreased mobility, Decreased cognition, Decreased safety awareness, Impaired judgement  Rehab Prognosis: Good  Evaluation/Treatment Tolerance: Treatment limited secondary to agitation  Medical Staff Made Aware: Yes  Barriers to Participation: Coping skills  End of Session Communication: Bedside nurse  End of Session Patient Position: Bed, 3 rail up, Alarm off, not on at start of session (with nurse)     PT Plan  Treatment/Interventions: Bed mobility, Transfer training, Gait training, Stair training, Strengthening, Endurance training, Therapeutic exercise, Therapeutic activity  PT Plan: Ongoing PT  PT Frequency: Daily  PT Discharge Recommendations: Moderate intensity level of continued care  PT Recommended Transfer Status: Assist x1, Assistive device  PT - OK to Discharge: Yes  Assessment: Patient is progressing Fairly with therapy this date. Tx limited by agitation see notes below. Lacks insight and overall safety awareness, very impulsive at times. Would continue to benefit from continued skilled PT to address all mobility deficits; Patient remains appropriate for MOD intensity therapy when medically appropriate for discharge from acute stay.  Will continue to follow.     General Visit Information:   PT  Visit  PT Received On: 07/26/24  Prior to Session Communication: Bedside nurse  Patient Position Received: Bed, 3 rail up, Alarm off, not on at start of session     Subjective   Subjective: Pt pleasant and agreeable to therapy upon approach  Precautions:  Precautions  Hearing/Visual Limitations: [place]  Medical Precautions: Fall precautions  Post-Surgical Precautions: Spinal  precautions  Vital Signs:       Objective   Pain:  Pain Assessment  Pain Assessment: 0-10  0-10 (Numeric) Pain Score: 0 - No pain  Cognition:  Cognition  Orientation Level: Oriented X4  Following Commands: Follows one step commands with repetition  Safety Judgment:  (decr need for assistance and safety prec)  Balance/Neuromuscular Re-Education  Balance/Neuromuscular Re-Education Activity Performed: Yes  Balance/Neuromuscular Re-Education Activity 1: 10x anterior/posterior/lateral side steps with fww @ CGA for improved dyn std balance       Dynamic Standing Balance:  Dynamic Standing Balance  Dynamic Standing-Balance Support: Bilateral upper extremity supported  Dynamic Standing-Balance: Turning  Dynamic Standing-Comments: CGA        Lines/Tubes/Drains:  NG/OG/Feeding Tube Other (Comment) Right nostril (Active)   Number of days: 5       PT Treatments:     Therapeutic Activity  Therapeutic Activity 1: Pt started session pleasant, pt became nauseous requesting to back to room. Therapist suggested to sit on bed if still nauseous and could bring commode over if unable to make it to bathroom. Pt became very angry and irritable yelling at this therapist. Very impulsive with decreased safety awareness. Attempts to educate pt on safety, unable to leave alone in bathroom/standing close to pt for assist d/t decreased balance. Pt becomes increasingly agitated and not  receptive to education.  Therapeutic Activity 2: Reviewed spinal precautions, pt noncompliant with frequent cues throughout session to prevent excessive rotation. No carryover  Balance/Neuromuscular Re-Education  Balance/Neuromuscular Re-Education Activity Performed: Yes  Balance/Neuromuscular Re-Education Activity 1: 10x anterior/posterior/lateral side steps with fww @ CGA for improved dyn std balance  Bed Mobility 1  Bed Mobility 1: Supine to sitting  Level of Assistance 1: Distant supervision  Bed Mobility Comments 1: vc to maintain spinal  precautions  Ambulation/Gait Training 1  Surface 1: Level tile  Device 1: Rolling walker  Assistance 1: Contact guard, Moderate verbal cues  Quality of Gait 1: Narrow base of support, Inconsistent stride length, Decreased step length, Shuffling gait, Forward flexed posture (R foot drop)  Comments/Distance (ft) 1: 150', 100' mild-moderate gait deviation, several LOB with turns. Pt reports dizziness often. Decreased safety awareness  Transfer 1  Transfer From 1: Sit to  Transfer to 1: Stand  Transfer Device 1: Walker  Transfer Level of Assistance 1: Close supervision  Trials/Comments 1: vc for handplacement     Outcome Measures:  [facility] Basic Mobility  Turning from your back to your side while in a flat bed without using bedrails: None  Moving from lying on your back to sitting on the side of a flat bed without using bedrails: A little  Moving to and from bed to chair (including a wheelchair): A little  Standing up from a chair using your arms (e.g. wheelchair or bedside chair): A little  To walk in hospital room: A little  Climbing 3-5 steps with railing: A little  Basic Mobility - Total Score: 19        Education Documentation  Precautions, taught by Estephania Velazquez PTA at 7/26/2024  1:08 PM.  Learner: Patient  Readiness: Nonacceptance  Method: Explanation  Response: Needs Reinforcement    Body Mechanics, taught by Estephania Velazquez PTA at 7/26/2024  1:08 PM.  Learner: Patient  Readiness: Nonacceptance  Method: Explanation  Response: Needs Reinforcement    Mobility Training, taught by Estephania Velazquez PTA at 7/26/2024  1:08 PM.  Learner: Patient  Readiness: Nonacceptance  Method: Explanation  Response: Needs Reinforcement    Handouts, taught by Estephania Velazquez PTA at 7/26/2024  1:08 PM.  Learner: Patient  Readiness: Nonacceptance  Method: Explanation  Response: Needs Reinforcement    Body Mechanics, taught by Estephania Velazquez PTA at 7/26/2024  1:08 PM.  Learner: Patient  Readiness: Nonacceptance  Method: Explanation  Response: Needs  Reinforcement    Precautions, taught by Estephania Velazquez PTA at 7/26/2024  1:08 PM.  Learner: Patient  Readiness: Nonacceptance  Method: Explanation  Response: Needs Reinforcement    ADL Training, taught by Estephania Velazquez PTA at 7/26/2024  1:08 PM.  Learner: Patient  Readiness: Nonacceptance  Method: Explanation  Response: Needs Reinforcement    Education Comments  No comments found.          OP EDUCATION:       Encounter Problems       Encounter Problems (Active)       Balance       Pt will score >/=24/28 on Tinetti to demonstrate decreased falls risk (Progressing)       Start:  07/18/24    Expected End:  08/01/24               Mobility       Pt will be SBA to ascend/descend 4 steps with LRAD and 7 steps with 1 handrail (Progressing)       Start:  07/18/24    Expected End:  08/01/24            Pt will be SBA ambulation 100 ft with RW (Progressing)       Start:  07/18/24    Expected End:  08/01/24               PT Transfers       Pt will be SBA for sit to stand and bed to chair transfers with RW (Progressing)       Start:  07/18/24    Expected End:  08/01/24            Pt will be [place] with bed mobility (Progressing)       Start:  07/18/24    Expected End:  08/01/24               Pain - Adult

## 2024-07-26 NOTE — CARE PLAN
The patient's goals for the shift include  no falls    The clinical goals for the shift include pt will remain safe      Problem: Fall/Injury  Goal: Verbalize understanding of personal risk factors for fall in the hospital  Outcome: Progressing  Goal: Verbalize understanding of risk factor reduction measures to prevent injury from fall in the home  Outcome: Progressing  Goal: Use assistive devices by end of the shift  Outcome: Progressing  Goal: Pace activities to prevent fatigue by end of the shift  Outcome: Progressing  Goal: Not fall by end of shift  Outcome: Progressing  Goal: Be free from injury by end of the shift  Outcome: Progressing     Problem: Skin  Goal: Participates in plan/prevention/treatment measures  Outcome: Progressing  Goal: Prevent/manage excess moisture  Outcome: Progressing  Goal: Prevent/minimize sheer/friction injuries  Outcome: Progressing  Goal: Promote/optimize nutrition  Outcome: Progressing  Goal: Promote skin healing  Outcome: Progressing     Problem: Discharge Planning  Goal: Discharge to home or other facility with appropriate resources  Outcome: Progressing     Problem: Chronic Conditions and Co-morbidities  Goal: Patient's chronic conditions and co-morbidity symptoms are monitored and maintained or improved  Outcome: Progressing     Problem: Diabetes  Goal: Achieve decreasing blood glucose levels by end of shift  Outcome: Progressing  Goal: Increase stability of blood glucose readings by end of shift  Outcome: Progressing  Goal: Decrease in ketones present in urine by end of shift  Outcome: Progressing  Goal: Maintain electrolyte levels within acceptable range throughout shift  Outcome: Progressing  Goal: Maintain glucose levels >70mg/dl to <250mg/dl throughout shift  Outcome: Progressing  Goal: No changes in neurological exam by end of shift  Outcome: Progressing  Goal: Learn about and adhere to nutrition recommendations by end of shift  Outcome: Progressing  Goal: Vital signs  within normal range for age by end of shift  Outcome: Progressing  Goal: Increase self care and/or family involovement by end of shift  Outcome: Progressing  Goal: Receive DSME education by end of shift  Outcome: Progressing     Problem: Pain  Goal: Takes deep breaths with improved pain control throughout the shift  Outcome: Progressing  Goal: Turns in bed with improved pain control throughout the shift  Outcome: Progressing  Goal: Walks with improved pain control throughout the shift  Outcome: Progressing  Goal: Performs ADL's with improved pain control throughout shift  Outcome: Progressing  Goal: Participates in PT with improved pain control throughout the shift  Outcome: Progressing  Goal: Free from opioid side effects throughout the shift  Outcome: Progressing  Goal: Free from acute confusion related to pain meds throughout the shift  Outcome: Progressing

## 2024-07-26 NOTE — PROGRESS NOTES
"Sweta Lim is a 68 y.o. female on day 7 of admission presenting with Degenerative cervical spinal stenosis.    Subjective   No acute events overnight, neck swelling appears stable or marginally decreased, patient denies difficulty breathing       Objective     Physical Exam  Neck soft with ecchymosis that is stable/decreasing. No significant swelling  A&Ox3  Hypophonic   Face symmetric  RUE D5 B5 T5 HG/IO 5  RLE HF5 KE5 DF4 PF 5  LUE D5 B5 T5 HG/IO 5  LLE HF5 KE5 DF/PF 5  Cortrak in place    Last Recorded Vitals  Blood pressure (S) 146/65, pulse 65, temperature 36 °C (96.8 °F), resp. rate 18, height 1.702 m (5' 7\"), weight 76.5 kg (168 lb 10.4 oz), SpO2 94%.  Intake/Output last 3 Shifts:  I/O last 3 completed shifts:  In: 1803 (23.6 mL/kg) [NG/GT:1803]  Out: - (0 mL/kg)   Weight: 76.5 kg         Assessment/Plan   Principal Problem:    Degenerative cervical spinal stenosis  Active Problems:    Cervical myelopathy (Multi)    Spondylosis, cervical, with myelopathy    Hematoma of neck    Pt is a 68yF h/o HTN, HLD, COPD, DEBRA, asthma, hypothyroidism, DM2, GERD, depression, anxiety, previous C5-6 ACDF, p/w neck pain and myelopathy, found to have L C4-5 paracentral disc, s/p C4-5 ACDF     7/19 Patient with increased neck swelling concerning for hematoma overnight, on exam patient with hoarse voice and difficulty swallowing, however difficulty with airway, breathing comfortably on room air      Lateral neck xray demonstrated sig incr neck swelling compared to prior c spine xrays     7/19 s/p right neck exploration and evacuation of hematoma   7/22 Failed MBS     Plan  Floor  Has bridled Cortrak  Nutrition recs  Follow up HTN meds as she has been hypertensive- does not appear to be on any home meds  Pending precert for [facility]  Continue to hold ASA, ok to restart POD14  Arrange 2 week  follow up for wound check  PTOT-SNF         Lawrence Cline MD      "

## 2024-07-28 ENCOUNTER — DOCUMENTATION (OUTPATIENT)
Dept: BEHAVIORAL HEALTH | Facility: CLINIC | Age: 69
End: 2024-07-28
Payer: MEDICARE

## 2024-07-28 DIAGNOSIS — F41.1 GAD (GENERALIZED ANXIETY DISORDER): ICD-10-CM

## 2024-07-28 DIAGNOSIS — Z79.899 LONG-TERM CURRENT USE OF BENZODIAZEPINE: ICD-10-CM

## 2024-07-28 RX ORDER — CLONAZEPAM 1 MG/1
TABLET ORAL
Qty: 45 TABLET | Refills: 0 | Status: SHIPPED | OUTPATIENT
Start: 2024-07-28

## 2024-07-28 NOTE — PROGRESS NOTES
Nonbillable note : patient needs urine drug screen done within one month. She recently was discharged from the hospital post surgery, reviewed hospital discharge instructions in the [facility] emr which includes clonazepam as an approved med to continue on discharge instructions , reviewed med order history and oarrs . Ordered urine drug screen and benzodiazepine confirm, this will  in one month  . Ran oarrs , oarrs does not have any concerns , no other controlled meds on oarrs . Patient is scheduled for September for an appointment . She will be out of clonazepam refills soon , ordered one month of clonazepam script at the same dose she is currently taking .kpacer cns

## 2024-07-31 NOTE — OP NOTE
Exploration and revision of neck hematoma (B) Operative Note     Date: 2024  OR Location: [facility] OR    Name: Sweta Lim, : 1955, Age: 68 y.o., MRN: [id number], Sex: female    Diagnosis  Pre-op Diagnosis      * Hematoma of neck, sequela [S10.93XS] Post-op Diagnosis     * Hematoma of neck, sequela [S10.93XS]     Procedures  Wound exploration and evacuation of prevertebral hematoma    Surgeons      * Nick Crump - Primary    Resident/Fellow/Other Assistant:  Surgeons and Role:  * Josh Huerta - Resident    Procedure Summary  Anesthesia: General  ASA: IV  Anesthesia Staff: Anesthesiologist: Brenda Rodrigues MD  Anesthesia Resident: Jazzy Ignacio DO  Estimated Blood Loss: 30 mL  Intra-op Medications:   Administrations occurring from 24 to 24 0100:   Medication Name Total Dose   acetaminophen (Tylenol) tablet 650 mg Cannot be calculated   benzocaine-menthol (Cepastat Sore Throat) lozenge 1 lozenge Cannot be calculated   busPIRone (Buspar) tablet 10 mg Cannot be calculated   busPIRone (Buspar) tablet 20 mg Cannot be calculated   clonazePAM (KlonoPIN) tablet 0.5 mg Cannot be calculated   cyclobenzaprine (Flexeril) tablet 10 mg Cannot be calculated   dexAMETHasone (Decadron) injection 2 mg Cannot be calculated   dextrose 50 % injection 12.5 g Cannot be calculated   dextrose 50 % injection 25 g Cannot be calculated   escitalopram (Lexapro) tablet 20 mg Cannot be calculated   ezetimibe (Zetia) tablet 10 mg Cannot be calculated   folic acid (Folvite) tablet 1 mg Cannot be calculated   gabapentin (Neurontin) capsule 300 mg Cannot be calculated   glucagon (Glucagen) injection 1 mg Cannot be calculated   glucagon (Glucagen) injection 1 mg Cannot be calculated   heparin (porcine) injection 5,000 Units Cannot be calculated   HYDROmorphone (Dilaudid) injection 0.5 mg 0.5 mg   insulin degludec (Tresiba) injection 15 Units Cannot be calculated   insulin lispro (HumaLOG) injection 0-10  Units Cannot be calculated   levothyroxine (Synthroid, Levoxyl) tablet 75 mcg Cannot be calculated   naloxone (Narcan) injection 0.2 mg Cannot be calculated   nicotine (Nicoderm CQ) 21 mg/24 hr patch 1 patch Cannot be calculated   oxyCODONE (Roxicodone) immediate release tablet 10 mg Cannot be calculated   oxyCODONE (Roxicodone) immediate release tablet 5 mg Cannot be calculated   pantoprazole (ProtoNix) EC tablet 40 mg Cannot be calculated   polyethylene glycol (Glycolax, Miralax) packet 17 g Cannot be calculated   primidone (Mysoline) tablet 100 mg Cannot be calculated   sennosides-docusate sodium (Kendal-Colace) 8.6-50 mg per tablet 2 tablet Cannot be calculated   simvastatin (Zocor) tablet 40 mg Cannot be calculated   traZODone (Desyrel) tablet 50 mg Cannot be calculated         Intraprocedure I/O Totals       None           Specimen: No specimens collected     Staff:   Circulator: Saira Leigh Person: Juanjose Leigh Person: Marta         Drains and/or Catheters:   NG/OG/Feeding Tube Other (Comment) Right nostril (Active)   Tube Status Continuous tube feeding 07/26/24 1330   Placement Verification X-ray 07/26/24 1330   Distal Tube Measurement 70 cm 07/24/24 1750   Site Assessment Clean;Dry;Intact 07/26/24 1330   Drainage Appearance None 07/25/24 0900   NG/OG Interventions Irrigated 07/22/24 1600   Irrigant Tap water 07/26/24 1330   Response To Intervention No resistance met 07/26/24 1330   Tube Securement Nasal bridle 07/26/24 1330   Tube Feeding Frequency Continuous 07/26/24 1330   Tube Feeding Glucerna 07/26/24 1330   Tube Feeding Strength Full strength 07/26/24 1330   Tube Feeding Method Continuous per pump 07/25/24 0900   Tube Feeding Rate (ml/hr) 55 mL/hr 07/26/24 1330   Tube Feeding Bag Changed Yes 07/26/24 1330   Feeding Tube Flushed With Tap water 07/26/24 1330   Intake (mL) 585 mL 07/26/24 1646   Intake - Flush (mL) 250 mL 07/26/24 1646   Gastric Aspirate - Residual Returned 0 mL 07/24/24 2100            Implants: none    Findings: venous oozing from longus colli muscle    Indications: Sweta Lim is an 68 y.o. female who is having surgery for Hematoma of neck, sequela [S10.93XS]. She had undergone a C4-C5 ACDF on July 17, 2024.  There were no intraoperative complications and good hemostasis was achieved prior to wound closure.  On the night of postoperative day 1, she developed bruising and swelling around her incision.  She also began to have difficulty swallowing.  She did not have any respiratory distress.  She was transferred to the ICU for close observation.  Subsequent imaging showed the interval development of a large postsurgical prevertebral hematoma.  I discussed multiple treatment options with the patient, including further observation as well as wound exploration.  The decision was made to proceed with surgery.    The patient was seen in the preoperative area. The risks, benefits, complications, treatment options, non-operative alternatives, expected recovery and outcomes were discussed with the patient. The possibilities of reaction to medication, pulmonary aspiration, injury to surrounding structures, bleeding, recurrent infection, the need for additional procedures, failure to diagnose a condition, and creating a complication requiring transfusion or operation were discussed with the patient. The patient concurred with the proposed plan, giving informed consent.  The site of surgery was properly noted/marked if necessary per policy. The patient has been actively warmed in preoperative area. Preoperative antibiotics have been ordered and given within 1 hours of incision. Venous thrombosis prophylaxis have been ordered including bilateral sequential compression devices    Procedure Details: The patient was brought to the operating room.  After patient identification and procedure confirmation, an endotracheal intubation was performed.  The field was prepped and draped in the usual sterile  fashion.  A time out was performed and perioperative intravenous antibiotics were confirmed.  The prior anterior neck incision was opened with Metzenbaum scissors.  The platysmal was then opened.  We immediately encountered an expansive hematoma.  This was evacuated with tumor forceps and suction down to the anterior cervical spine.  The wound was copiously irrigated with further evacuation of the hematoma.  At this time, we did observe some venous bleeding from the left longus colli muscle.  This was stopped with bipolar electrocautery.  The prevertebral space was thoroughly inspected with gross removal of all observable hematoma.  We then achieved complete hemostatis with a combination of Irrisept irrigation, Floseal, and Surgicel powder.  We then turned our attention to the closure.  The platysmal and subcutaneous layers were closed with interrupted 3-0 Vicryl sutures.  The skin was closed with topical adhesive.    Complications:  None; patient tolerated the procedure well.    Disposition: ICU - extubated and stable.  Condition: stable       Attending Attestation: I was present and scrubbed for the entire procedure.    Nick Crump  Phone Number: [phone]

## 2024-08-02 ENCOUNTER — NURSING HOME VISIT (OUTPATIENT)
Dept: POST ACUTE CARE | Facility: EXTERNAL LOCATION | Age: 69
End: 2024-08-02
Payer: MEDICARE

## 2024-08-02 DIAGNOSIS — J44.9 OSA AND COPD OVERLAP SYNDROME (MULTI): ICD-10-CM

## 2024-08-02 DIAGNOSIS — G47.33 OSA AND COPD OVERLAP SYNDROME (MULTI): ICD-10-CM

## 2024-08-02 DIAGNOSIS — I10 DIABETES MELLITUS WITH COINCIDENT HYPERTENSION (MULTI): ICD-10-CM

## 2024-08-02 DIAGNOSIS — E11.9 DIABETES MELLITUS WITH COINCIDENT HYPERTENSION (MULTI): ICD-10-CM

## 2024-08-02 DIAGNOSIS — M48.02 CERVICAL STENOSIS OF SPINE: ICD-10-CM

## 2024-08-02 DIAGNOSIS — Z98.890 H/O CERVICAL SPINE SURGERY: Primary | ICD-10-CM

## 2024-08-02 DIAGNOSIS — R53.1 GENERALIZED WEAKNESS: ICD-10-CM

## 2024-08-02 PROCEDURE — 99304 1ST NF CARE SF/LOW MDM 25: CPT | Performed by: STUDENT IN AN ORGANIZED HEALTH CARE EDUCATION/TRAINING PROGRAM

## 2024-08-02 NOTE — LETTER
Patient: Sweta Lim  : 1955    Encounter Date: 2024    Date of Service:       HPI/Subjective  Sweta Lim is a 69 y.o. female  With past medical history of hypertension, hyperlipidemia, COPD, DEBRA, asthma, hypothyroidism, type 2 diabetes, major depressive disorder, OJRGE, history of C5-C6 ACDF.  She is status post C4-C5 ACDF on 2024, status post right neck exploration and evacuation of hematoma on 2024.  Her stay was complicated by failed swallow evaluation requiring Corpak placement.  She is admitted to [facility] for skilled rehabilitation.  She is currently on 2.5 L nasal cannula oxygen from hospital but does not c/o SOB/ROBERTS.  NG tube remains in place with tube feeds running.  Currently pain is well-controlled.  Denies any nausea, vomiting, abdominal pain.  Plan of care discussed, concerns addressed.  Patient to follow-up with neurosurgery on discharge.  No other complaints or concerns at this time.      Other Medical, Surgical, Family, Social Hx, Allergies per chart in PCC.   Medication list reviewed. Please see PCC.     Objective:   Physical Exam     Vital signs reviewed. Please see chart in PCC.     General: NAD. NCAT. AOx3  HEENT: PERRLA. EOMI. MMM. Nares patent bl.  Cardiovascular: RRR. S1/S2 wnl.   Respiratory: Minimal bilateral scattered rhonchi. No acute respiratory distress.  Saturating well on nasal cannula oxygen  GI: Soft, NT abdomen. BS present x 4.  Corpak in place  MSK: Generalized weakness.  Status post cervical spine surgery.  Extremities: No major edema.   Skin: No visible rashes or bruises.   Neuro: Cranial Nerves grossly intact.  Right foot drop  Psych: Mood wnl.          REVIEW OF SYSTEMS   ROS reviewed within HPI and is otherwise negative       Assessment and Plan  Encounter Diagnoses   Name Primary?   • H/O cervical spine surgery Yes   • Cervical stenosis of spine    • Generalized weakness    • Diabetes mellitus with coincident hypertension (Multi)    • DEBRA and  COPD overlap syndrome (Multi)        - Continue pain medication regimen  - Diabetic control.  Continue current medications for diabetes control.  Monitor blood glucose levels.  Low-carb diet recommended.  - Continue supplemental O2 and wean as tolerated.  - Monitor blood pressure.  Continue current blood pressure medication regimen.  -Pre-Admission hospital notes reviewed  -Pertinent radiology, if any, reviewed   -Current rehab plan reviewed; continue pending any major changes  -Current medication therapy reviewed. Continue and monitor for adverse effects.  -Monitor vitals  -Monitor labs  -Continue home medications for chronic medical conditions.   -PT/OT as tolerated  -Low carb, Low sodium, Low fat diet advised         Charting was completed using voice recognition technology and may include unintended errors.    Joao Pitts MD       Electronically Signed By: Joao Pitts MD   9/23/24 12:24 AM

## 2024-08-07 ENCOUNTER — APPOINTMENT (OUTPATIENT)
Dept: NEUROSURGERY | Facility: CLINIC | Age: 69
End: 2024-08-07
Payer: MEDICARE

## 2024-08-14 ENCOUNTER — TELEPHONE (OUTPATIENT)
Dept: NEUROSURGERY | Facility: CLINIC | Age: 69
End: 2024-08-14

## 2024-08-14 ENCOUNTER — APPOINTMENT (OUTPATIENT)
Dept: PSYCHOLOGY | Facility: CLINIC | Age: 69
End: 2024-08-14
Payer: MEDICARE

## 2024-08-14 ENCOUNTER — TELEPHONE (OUTPATIENT)
Dept: PRIMARY CARE | Facility: CLINIC | Age: 69
End: 2024-08-14

## 2024-08-14 NOTE — TELEPHONE ENCOUNTER
Ears hurt and then it goes away  this has been going on for 2-3 months   She is in rehab currently  O Mahad so she cannot come in

## 2024-08-16 ENCOUNTER — TELEPHONE (OUTPATIENT)
Dept: NEUROSURGERY | Facility: CLINIC | Age: 69
End: 2024-08-16
Payer: MEDICARE

## 2024-08-19 ENCOUNTER — APPOINTMENT (OUTPATIENT)
Dept: PRIMARY CARE | Facility: CLINIC | Age: 69
End: 2024-08-19
Payer: MEDICARE

## 2024-08-20 NOTE — PROGRESS NOTES
Sweta Lim is a 69 y.o. female who is here for her 1st post op visit. She underwent exploration and revision of neck hematoma on 07/19/2024, at [facility] by Dr. Nick Crump. She had undergone ACDF C4 - 5 on 07/17/2024, at [facility] by Dr. Crump as well. Post op course was complicated by dysphagia for which she had a feeding tube placed.  She was discharged to SNF on 07/26/2024.    Since discharge from the hospital, she feels better overall, but states she is not walking as well and is falling because of dropped right foot. She is awaiting brace for drop foot. She continues with feeding tube (NG). She is able to swallow water and apple sauce. She tolerating pain medications. Activity level - she is able to complete ADLs with much assistance.    Smoker: YES  Anticoagulation: No    On exam:  A&O x 3, speech clear / fluent  Respirations even / unlabored  FAYE  SURGICAL INCISION is: well approximated, no erythema / swelling / drainage  Gait - not tested    Sweta Lim is progressing slowly post operatively. Her family with her is not happy with care at [facility]. She agrees to follow up with Dr. Crump as previously scheduled, 10/08/2024, with cervical spine x-rays prior to visit.  Sarai Hooker, RENETTA-CNP

## 2024-08-22 ENCOUNTER — APPOINTMENT (OUTPATIENT)
Dept: NEUROSURGERY | Facility: CLINIC | Age: 69
End: 2024-08-22
Payer: MEDICARE

## 2024-08-22 ENCOUNTER — TELEPHONE (OUTPATIENT)
Dept: NEUROSURGERY | Facility: CLINIC | Age: 69
End: 2024-08-22

## 2024-08-22 VITALS
SYSTOLIC BLOOD PRESSURE: 118 MMHG | DIASTOLIC BLOOD PRESSURE: 68 MMHG | HEIGHT: 67 IN | BODY MASS INDEX: 21.97 KG/M2 | TEMPERATURE: 98 F | WEIGHT: 140 LBS

## 2024-08-22 DIAGNOSIS — G95.9 CERVICAL MYELOPATHY (MULTI): ICD-10-CM

## 2024-08-22 DIAGNOSIS — Z98.1 S/P CERVICAL SPINAL FUSION: Primary | ICD-10-CM

## 2024-08-22 PROCEDURE — 1159F MED LIST DOCD IN RCRD: CPT | Performed by: NURSE PRACTITIONER

## 2024-08-22 PROCEDURE — 1125F AMNT PAIN NOTED PAIN PRSNT: CPT | Performed by: NURSE PRACTITIONER

## 2024-08-22 PROCEDURE — 1111F DSCHRG MED/CURRENT MED MERGE: CPT | Performed by: NURSE PRACTITIONER

## 2024-08-22 PROCEDURE — 3074F SYST BP LT 130 MM HG: CPT | Performed by: NURSE PRACTITIONER

## 2024-08-22 PROCEDURE — 1160F RVW MEDS BY RX/DR IN RCRD: CPT | Performed by: NURSE PRACTITIONER

## 2024-08-22 PROCEDURE — 3078F DIAST BP <80 MM HG: CPT | Performed by: NURSE PRACTITIONER

## 2024-08-22 PROCEDURE — 3044F HG A1C LEVEL LT 7.0%: CPT | Performed by: NURSE PRACTITIONER

## 2024-08-22 PROCEDURE — 3008F BODY MASS INDEX DOCD: CPT | Performed by: NURSE PRACTITIONER

## 2024-08-22 PROCEDURE — 99024 POSTOP FOLLOW-UP VISIT: CPT | Performed by: NURSE PRACTITIONER

## 2024-08-22 PROCEDURE — 4004F PT TOBACCO SCREEN RCVD TLK: CPT | Performed by: NURSE PRACTITIONER

## 2024-08-22 ASSESSMENT — PATIENT HEALTH QUESTIONNAIRE - PHQ9
2. FEELING DOWN, DEPRESSED OR HOPELESS: SEVERAL DAYS
1. LITTLE INTEREST OR PLEASURE IN DOING THINGS: SEVERAL DAYS
SUM OF ALL RESPONSES TO PHQ9 QUESTIONS 1 AND 2: 2

## 2024-08-22 ASSESSMENT — PAIN SCALES - GENERAL: PAINLEVEL: 5

## 2024-08-22 ASSESSMENT — ENCOUNTER SYMPTOMS: OCCASIONAL FEELINGS OF UNSTEADINESS: 1

## 2024-08-27 PROBLEM — S10.93XA HEMATOMA OF NECK: Status: RESOLVED | Noted: 2024-05-29 | Resolved: 2024-08-27

## 2024-08-27 ASSESSMENT — ENCOUNTER SYMPTOMS
WHEEZING: 0
CONSTITUTIONAL NEGATIVE: 1
SHORTNESS OF BREATH: 0

## 2024-08-27 NOTE — PROGRESS NOTES
Subjective   Patient ID: Sweta Lim is a 69 y.o. female who presents for Hospital Follow-up.    ACP  Last physical: 11/3/23-has next appt scheduled  Last labs-8/21/2024 cbc -wbc high/h/h high, cmp-ca high/liver enz high/sugar high/kidney function nl  7/2024 A1c 6.6, tsh nl  5/2024 A1c 7.4, microalb abn  3/2024 tsh nl, t4 low  7/2023 A1c 5.9  6/2023 Sugar (aka glucose), kidney function, liver function and electrolytes in the CMP (comprehensive metabolic panel) were normal.  Hdl and trigs normal  Ldl high at 135. Goal <100. Decr fats (decrease portions of snacks and desserts) and incr fiber (increase veggies to at least 2 a day and at least 1 fruit a day).  CBC (complete blood count) was normal which looks at infection and anemia markers.  12/2023 A1c 6.5  Due for lipid    Does she want a flu shot? No   Bps at home- 130/80  Does pt have a cpap? Yes too snug then tremor starts  *If yes, using nightly? Cannot   Using trelegy inhaler daily consistently? Yes   *Does pt have an albuterol inh (rescue inhaler) at home? No needs one   *Did pt do 24hr urine?  No   If no, does she still have the container? No    *Why is pt not taking the simvastatin? They gave her a different med   When did she go home from the skilled nursing facility? Friday   Is she getting home health? Aunt is doing home health for her.   Was there any plan for high blood counts or high liver enzymes or high calcium at last labs?  No, she was unaware of this   Does she want to discuss stopping smoking? Yes 6 weeks ago   Any other questions or concerns that pt wants to discuss today?  Has a sore buttock from the hospital  Discuss the cycobenzaprine, and alendronate- says she's taking 8 tablets once a week?   Ears       HCC    Sees endo and psychiatry    Aunt is here with pt and helping her at home  Pt has stopped smoking    HPI  Due for lipid-print    Hard to hear; lf ear crackling and pain    Voice loss-after surgery  Since fri, stronger on feet per  aunt    Has a sore on buttock from the hospital-using a barrier cream    Discuss the cycobenzaprine, and methotrexate- says she's taking 8 tablets once a week?   Ears     Pt underwent exploration and revision of neck hematoma on 07/19/2024, at [facility] by Dr. Nick Crump. She had undergone ACDF C4 - 5 on 07/17/2024, at [facility] by Dr. Crump as well. Post op course was complicated by dysphagia for which she had a feeding tube placed.  She was discharged to SNF on 07/26/2024.  Went home from snf 8/23/24     Since discharge from the hospital, she feels better overall, but states she is not walking as well and is falling because of dropped right foot. She is awaiting brace for drop foot. She continues with feeding tube (NG). She is able to swallow water and apple sauce. She tolerating pain medications. Activity level - she is able to complete ADLs with much assistance.    ER 8/24/24 for ng tube evaluation-can't flush it and can't speak clearly  ng tube removed  Aunt is helping her at home-romel    Patient Care Team     Relationship Specialty Notifications Start End   Tiara Deal MD Referring Physician Endocrinology  8/27/24     Address:  [address] [place] [address]        Review of Systems   Constitutional: Negative.    HENT:  Positive for ear pain.    Respiratory:  Negative for shortness of breath and wheezing.    Cardiovascular:  Negative for chest pain.   Skin:  Positive for wound.       Objective   Visit Vitals  /80   Pulse 74   Temp 36.2 °C (97.1 °F)      BP Readings from Last 3 Encounters:   08/28/24 148/80   08/22/24 118/68   07/26/24 169/84     Wt Readings from Last 3 Encounters:   08/28/24 70.9 kg (156 lb 6.4 oz)   08/22/24 63.5 kg (140 lb)   07/20/24 76.5 kg (168 lb 10.4 oz)       Physical Exam  Constitutional:       General: She is not in acute distress.     Appearance: Normal appearance.   HENT:      Right Ear: Ear canal and external ear normal.      Left Ear: Ear canal  and external ear normal.      Ears:      Comments: Fluid behind tms  Skin:     Comments: 5mm open sore on rt buttock  No redness incr around area  No discharge noted   Neurological:      Mental Status: She is alert.       Assessment/Plan   Diagnoses and all orders for this visit:  Mixed hyperlipidemia  Microalbuminuria  Dyslipidemia  -     simvastatin (Zocor) 40 mg tablet; Take 1 tablet (40 mg) by mouth once daily.  Abnormal CBC  -     Referral to Hematology and Oncology; Future  Elevated liver enzymes  -     Referral to Hepatology; Future  Mild intermittent asthma without complication ([facility]-HCC)  -     albuterol 90 mcg/actuation inhaler; Inhale 2 puffs every 6 hours if needed for wheezing.  Skin ulcer, unspecified ulcer stage (Multi)  -     mupirocin (Bactroban) 2 % ointment; Apply topically 3 times a day for 10 days. apply to affected area  -     doxycycline (Vibramycin) 100 mg capsule; Take 1 capsule (100 mg) by mouth 2 times a day for 10 days. Take with at least 8 ounces (large glass) of water, do not lie down for 30 minutes after  Osteoporosis, disuse  -     alendronate (Fosamax) 70 mg tablet; Take 1 tablet (70 mg) by mouth every 7 days. BEFORE FIRST FOOD, DRINK OR MEDICINE OF THE DAY. DISSOLVE IN 4OZ OF WATER  Benign head tremor  -     primidone (Mysoline) 50 mg tablet; Take 2 tablets (100 mg) by mouth 2 times a day.  Screen for STD (sexually transmitted disease)  Other fatigue  -     Hepatitis Panel, Acute; Future

## 2024-08-28 ENCOUNTER — APPOINTMENT (OUTPATIENT)
Dept: PRIMARY CARE | Facility: CLINIC | Age: 69
End: 2024-08-28
Payer: MEDICARE

## 2024-08-28 VITALS
HEIGHT: 67 IN | OXYGEN SATURATION: 96 % | DIASTOLIC BLOOD PRESSURE: 88 MMHG | SYSTOLIC BLOOD PRESSURE: 138 MMHG | HEART RATE: 74 BPM | TEMPERATURE: 97.1 F | BODY MASS INDEX: 24.55 KG/M2 | WEIGHT: 156.4 LBS

## 2024-08-28 DIAGNOSIS — E78.2 MIXED HYPERLIPIDEMIA: Primary | ICD-10-CM

## 2024-08-28 DIAGNOSIS — M81.8 OSTEOPOROSIS, DISUSE: ICD-10-CM

## 2024-08-28 DIAGNOSIS — R53.83 OTHER FATIGUE: ICD-10-CM

## 2024-08-28 DIAGNOSIS — G25.0 BENIGN HEAD TREMOR: ICD-10-CM

## 2024-08-28 DIAGNOSIS — R79.89 ABNORMAL CBC: ICD-10-CM

## 2024-08-28 DIAGNOSIS — L98.499 SKIN ULCER, UNSPECIFIED ULCER STAGE (MULTI): ICD-10-CM

## 2024-08-28 DIAGNOSIS — R74.8 ELEVATED LIVER ENZYMES: ICD-10-CM

## 2024-08-28 DIAGNOSIS — E78.5 DYSLIPIDEMIA: ICD-10-CM

## 2024-08-28 DIAGNOSIS — R80.9 MICROALBUMINURIA: ICD-10-CM

## 2024-08-28 DIAGNOSIS — J45.20 MILD INTERMITTENT ASTHMA WITHOUT COMPLICATION (HHS-HCC): ICD-10-CM

## 2024-08-28 LAB
NON-UH HIE CALCULATED LDL CHOLESTEROL: 112 MG/DL (ref 60–130)
NON-UH HIE CHOLESTEROL: 179 MG/DL (ref 100–200)
NON-UH HIE HDL CHOLESTEROL: 47 MG/DL (ref 40–60)
NON-UH HIE HEPATITIS A ANTIBODY, IGM: NONREACTIVE
NON-UH HIE HEPATITIS B CORE ANTIBODY, IGM: NONREACTIVE
NON-UH HIE HEPATITIS B SURFACE ANTIGEN: NONREACTIVE
NON-UH HIE HEPATITIS C ANTIBODY: NONREACTIVE
NON-UH HIE TOTAL CHOL/HDL CHOL RATIO: 3.8
NON-UH HIE TRIGLYCERIDES: 101 MG/DL (ref 30–150)

## 2024-08-28 PROCEDURE — 3044F HG A1C LEVEL LT 7.0%: CPT | Performed by: NURSE PRACTITIONER

## 2024-08-28 PROCEDURE — 3008F BODY MASS INDEX DOCD: CPT | Performed by: NURSE PRACTITIONER

## 2024-08-28 PROCEDURE — 3079F DIAST BP 80-89 MM HG: CPT | Performed by: NURSE PRACTITIONER

## 2024-08-28 PROCEDURE — 1124F ACP DISCUSS-NO DSCNMKR DOCD: CPT | Performed by: NURSE PRACTITIONER

## 2024-08-28 PROCEDURE — 99215 OFFICE O/P EST HI 40 MIN: CPT | Performed by: NURSE PRACTITIONER

## 2024-08-28 PROCEDURE — 1159F MED LIST DOCD IN RCRD: CPT | Performed by: NURSE PRACTITIONER

## 2024-08-28 PROCEDURE — 3077F SYST BP >= 140 MM HG: CPT | Performed by: NURSE PRACTITIONER

## 2024-08-28 PROCEDURE — 3075F SYST BP GE 130 - 139MM HG: CPT | Performed by: NURSE PRACTITIONER

## 2024-08-28 PROCEDURE — 4004F PT TOBACCO SCREEN RCVD TLK: CPT | Performed by: NURSE PRACTITIONER

## 2024-08-28 RX ORDER — SULFAMETHOXAZOLE AND TRIMETHOPRIM 800; 160 MG/1; MG/1
1 TABLET ORAL 2 TIMES DAILY
Qty: 14 TABLET | Refills: 0 | Status: CANCELLED | OUTPATIENT
Start: 2024-08-28 | End: 2024-09-04

## 2024-08-28 RX ORDER — ALENDRONATE SODIUM 70 MG/1
70 TABLET ORAL
Qty: 12 TABLET | Refills: 4 | Status: SHIPPED | OUTPATIENT
Start: 2024-08-28

## 2024-08-28 RX ORDER — PRIMIDONE 50 MG/1
100 TABLET ORAL 2 TIMES DAILY
Qty: 120 TABLET | Refills: 11 | Status: SHIPPED | OUTPATIENT
Start: 2024-08-28

## 2024-08-28 RX ORDER — DOXYCYCLINE 100 MG/1
100 CAPSULE ORAL 2 TIMES DAILY
Qty: 20 CAPSULE | Refills: 0 | Status: SHIPPED | OUTPATIENT
Start: 2024-08-28 | End: 2024-09-07

## 2024-08-28 RX ORDER — SIMVASTATIN 40 MG/1
40 TABLET, FILM COATED ORAL DAILY
Qty: 90 TABLET | Refills: 2 | Status: SHIPPED | OUTPATIENT
Start: 2024-08-28

## 2024-08-28 RX ORDER — MUPIROCIN 20 MG/G
OINTMENT TOPICAL 3 TIMES DAILY
Qty: 22 G | Refills: 1 | Status: SHIPPED | OUTPATIENT
Start: 2024-08-28 | End: 2024-09-07

## 2024-08-28 RX ORDER — ALBUTEROL SULFATE 90 UG/1
2 INHALANT RESPIRATORY (INHALATION) EVERY 6 HOURS PRN
Qty: 18 G | Refills: 1 | Status: SHIPPED | OUTPATIENT
Start: 2024-08-28 | End: 2025-08-28

## 2024-08-28 ASSESSMENT — ENCOUNTER SYMPTOMS: WOUND: 1

## 2024-08-28 ASSESSMENT — PATIENT HEALTH QUESTIONNAIRE - PHQ9
1. LITTLE INTEREST OR PLEASURE IN DOING THINGS: NOT AT ALL
SUM OF ALL RESPONSES TO PHQ9 QUESTIONS 1 AND 2: 0
2. FEELING DOWN, DEPRESSED OR HOPELESS: NOT AT ALL

## 2024-08-28 NOTE — PATIENT INSTRUCTIONS
Stay quit from smoking  Continue trelegy inhaler daily  Use albuterol inh as needed-rx sent in    Check bp daily  Goal <140/<90  I will message you in 1wk to obtain bps    Refills sent in    Lab in building today- lipid and hepatitis labs    Antibiotic ointment twice a day  Then barrier cream in between  Antibiotic    Restart simvastatin    Stop cyclobenzaprine    Start otc zyrtec 1 a day    Refer to heme   Refer to liver     Keep nov appt      I will communicate with you via [employer] regarding messages and results. If you need help with this, you can call the support line at [phone].    IT WAS A PLEASURE TO SEE YOU TODAY. THANK YOU FOR CHOOSING US FOR YOUR HEALTHCARE NEEDS.

## 2024-08-30 ENCOUNTER — TELEPHONE (OUTPATIENT)
Dept: PRIMARY CARE | Facility: CLINIC | Age: 69
End: 2024-08-30
Payer: MEDICARE

## 2024-09-03 ENCOUNTER — TELEPHONE (OUTPATIENT)
Dept: PRIMARY CARE | Facility: CLINIC | Age: 69
End: 2024-09-03

## 2024-09-03 ENCOUNTER — APPOINTMENT (OUTPATIENT)
Dept: BEHAVIORAL HEALTH | Facility: CLINIC | Age: 69
End: 2024-09-03
Payer: MEDICARE

## 2024-09-03 NOTE — TELEPHONE ENCOUNTER
Pt has not viewed the test results on [employer].  Pls call pt.      No hepatitis noted  Trigs and hdl normal.  Hdl just slightly low at 47. Goal >50 for women. Incr exercise.

## 2024-09-17 ENCOUNTER — TELEPHONE (OUTPATIENT)
Dept: PRIMARY CARE | Facility: CLINIC | Age: 69
End: 2024-09-17
Payer: MEDICARE

## 2024-09-17 NOTE — TELEPHONE ENCOUNTER
[facility] called (9/17/24 @2:20p) to inform us that Sweta did not attend her PT apt last week. He said that they tried to knock multiple times, but she was sleeping and made no attempt to see them or reschedule.          Also, [facility] OT called (9/17/24 @2:29p) to inform us that pt fell and bruised her knee on Javan 9/15/24.

## 2024-09-19 NOTE — TELEPHONE ENCOUNTER
Patient states they are coming today  Asked about how she fell and she was very quick to get off the phone and say she will call us back.

## 2024-09-19 NOTE — TELEPHONE ENCOUNTER
Pt stated she fell because she was wearing a leg/foot brace that didn't fit well and left her feeling off balance. Pt states she now has a brace that fits better and will wear it from now on.

## 2024-09-23 NOTE — PROGRESS NOTES
Date of Service: 82/2/24      HPI/Subjective  Sweta Lim is a 69 y.o. female  With past medical history of hypertension, hyperlipidemia, COPD, DEBRA, asthma, hypothyroidism, type 2 diabetes, major depressive disorder, JORGE, history of C5-C6 ACDF.  She is status post C4-C5 ACDF on 7/16/2024, status post right neck exploration and evacuation of hematoma on 7/19/2024.  Her stay was complicated by failed swallow evaluation requiring Corpak placement.  She is admitted to [facility] for skilled rehabilitation.  She is currently on 2.5 L nasal cannula oxygen from hospital but does not c/o SOB/ROBERTS.  NG tube remains in place with tube feeds running.  Currently pain is well-controlled.  Denies any nausea, vomiting, abdominal pain.  Plan of care discussed, concerns addressed.  Patient to follow-up with neurosurgery on discharge.  No other complaints or concerns at this time.      Other Medical, Surgical, Family, Social Hx, Allergies per chart in PCC.   Medication list reviewed. Please see PCC.     Objective:   Physical Exam     Vital signs reviewed. Please see chart in PCC.     General: NAD. NCAT. AOx3  HEENT: PERRLA. EOMI. MMM. Nares patent bl.  Cardiovascular: RRR. S1/S2 wnl.   Respiratory: Minimal bilateral scattered rhonchi. No acute respiratory distress.  Saturating well on nasal cannula oxygen  GI: Soft, NT abdomen. BS present x 4.  Corpak in place  MSK: Generalized weakness.  Status post cervical spine surgery.  Extremities: No major edema.   Skin: No visible rashes or bruises.   Neuro: Cranial Nerves grossly intact.  Right foot drop  Psych: Mood wnl.          REVIEW OF SYSTEMS   ROS reviewed within HPI and is otherwise negative       Assessment and Plan  Encounter Diagnoses   Name Primary?    H/O cervical spine surgery Yes    Cervical stenosis of spine     Generalized weakness     Diabetes mellitus with coincident hypertension (Multi)     DEBRA and COPD overlap syndrome (Multi)        - Continue pain medication regimen  -  Diabetic control.  Continue current medications for diabetes control.  Monitor blood glucose levels.  Low-carb diet recommended.  - Continue supplemental O2 and wean as tolerated.  - Monitor blood pressure.  Continue current blood pressure medication regimen.  -Pre-Admission hospital notes reviewed  -Pertinent radiology, if any, reviewed   -Current rehab plan reviewed; continue pending any major changes  -Current medication therapy reviewed. Continue and monitor for adverse effects.  -Monitor vitals  -Monitor labs  -Continue home medications for chronic medical conditions.   -PT/OT as tolerated  -Low carb, Low sodium, Low fat diet advised         Charting was completed using voice recognition technology and may include unintended errors.    Joao Pitts MD

## 2024-09-27 ENCOUNTER — TELEPHONE (OUTPATIENT)
Dept: PRIMARY CARE | Facility: CLINIC | Age: 69
End: 2024-09-27
Payer: MEDICARE

## 2024-09-27 NOTE — TELEPHONE ENCOUNTER
[facility] called to inform Leah that Sweta has been discharged from physical therapy.

## 2024-10-04 PROBLEM — M54.2 NECK PAIN: Status: RESOLVED | Noted: 2023-02-28 | Resolved: 2024-10-04

## 2024-10-04 PROBLEM — M48.02 SPINAL STENOSIS OF CERVICAL REGION: Status: RESOLVED | Noted: 2023-02-28 | Resolved: 2024-10-04

## 2024-10-04 PROBLEM — M47.12 SPONDYLOSIS, CERVICAL, WITH MYELOPATHY: Status: RESOLVED | Noted: 2024-05-29 | Resolved: 2024-10-04

## 2024-10-04 ASSESSMENT — ENCOUNTER SYMPTOMS
EYE PAIN: 0
UNEXPECTED WEIGHT CHANGE: 0
HEADACHES: 0
FREQUENCY: 0
WOUND: 0
POLYDIPSIA: 0
PALPITATIONS: 0
BLOOD IN STOOL: 0
VOMITING: 0
CONFUSION: 0
FEVER: 0
BRUISES/BLEEDS EASILY: 0
FATIGUE: 0
NECK PAIN: 0
COUGH: 0
ADENOPATHY: 0
DIZZINESS: 0
APPETITE CHANGE: 0
SHORTNESS OF BREATH: 0
SORE THROAT: 0
TROUBLE SWALLOWING: 0
EYE DISCHARGE: 0
EYE REDNESS: 0
POLYPHAGIA: 0
DYSURIA: 0
BACK PAIN: 0
HEMATURIA: 0
ABDOMINAL PAIN: 0
CHILLS: 0
HALLUCINATIONS: 0

## 2024-10-05 NOTE — PROGRESS NOTES
Subjective   Patient ID: Sweta Lim is a 69 y.o. female who presents for Medicare Annual Wellness Visit Subsequent.    This is a medicare wellness; pls ask medicare questions  ACP  Does pt see any providers other than care team below:   anurag Sullivan/jules, elliot, tamiko, onelia, giovanni, ziggy, indu, prince, benito, franklin, jose, kranthi, joseph, tej, ann  Name of eye dr- dr barajas   When was the last time she saw the podiatrist? 3 weeks ago     Did pt do 24hr urine?  No    Does pt want flu shot? yes  Last albuterol inh use?  2 days ago   Using trelegy consistently? Yes   Bps at home-  does not do   Last eye dr appt- 1 year ago   B12-n/a  On statin  Is pt still not smoking? No she is smoking. 6wks  Cyst lf arm-when did she notice this?  1 month ago, has changed since then   Any other questions or concerns today? No      Cancel nov appt  Need lala-podiatrist-last seen 3/28/24      Liver dr appt 10/24/24  Heme dr appt 12/18/24    Patient Care Team     Relationship Specialty Notifications Start End   Tiara Deal MD Referring Physician Endocrinology  8/27/24     Address:  [address] [place] [address]       HPI  Last labs-8/28/24 No hepatitis noted  Trigs and hdl normal.  Hdl just slightly low at 47. Goal >50 for women. Incr exercise.  8/21/2024 cbc -wbc high/h/h high, cmp-ca high/liver enz high/sugar high/kidney function nl  7/2024 A1c 6.6, tsh nl  5/2024 A1c 7.4, microalb abn  3/2024 tsh nl, t4 low  7/2023 A1c 5.9  6/2023 Sugar (aka glucose), kidney function, liver function and electrolytes in the CMP (comprehensive metabolic panel) were normal.  Hdl and trigs normal  Ldl high at 135. Goal <100. Decr fats (decrease portions of snacks and desserts) and incr fiber (increase veggies to at least 2 a day and at least 1 fruit a day).  CBC (complete blood count) was normal which looks at infection and anemia markers.  12/2023 A1c 6.5  7/2023 tsh nl, A1c 5.9  6/2023 Sugar (aka glucose),  "kidney function, liver function and electrolytes in the CMP (comprehensive metabolic panel) were normal.  Hdl and trigs normal  Ldl high at 135. Goal <100. Decr fats (decrease portions of snacks and desserts) and incr fiber (increase veggies to at least 2 a day and at least 1 fruit a day).  CBC (complete blood count) was normal which looks at infection and anemia markers.  Due for labs- none    No results found for: \"CHOL\"  No results found for: \"TRIG\"  No results found for: \"HDL\"  No results found for: \"LDL\"  No results found for: \"TSH\"  No results found for: \"A1C\"  No components found for: \"POCA1C\"  No results found for: \"ALBUR\"  No components found for: \"POCALBUR\"      Other concerns: lf armpit cyst x 1mon; gotten bigger      ER/urgicare visits in the last year- 5/2024, 5/2024 wrist sprain, 3/2024  Hospitalizations in the last year- 7/17/24 c4-5 fusion, 7/19/24 hematoma revision    FH ovarian, endometrial, cervical, uterine ca--none      last mammo- (40-75/90) 11/10/23 [facility]      FH br ca-none    last colonoscopy/cologuard/FIT (45-75) 11/30/23; due 11/2024  FH colon ca-none      last bone density-(age 65-85) or if fx after age 50) 7/5/23    lung ca screening (age 50-75 and 1 ppd x 20yrs and currently smoke or quit within 15yrs)-dr zarate        Exercise- dog walk  Diet-has gained wt  Body mass index is 27.53 kg/m².      last dental appt- not cleared    BMs-regular   Sleep-able to fall asleep and stay asleep-too much sleep; no snoring or apnea  no cp, swelling, sob, abd pain, n/v/d/c, blood in stool or black stools  STI testing including hiv (age 15-65) and hep c screening (18-79)-declines      Immunization History   Administered Date(s) Administered    Flu vaccine (IIV4), preservative free *Check age/dose* 08/10/2018    Flu vaccine, quadrivalent, recombinant, preservative free, adult (FLUBLOK) 02/14/2020    Flu vaccine, trivalent, preservative free, HIGH-DOSE, age 65y+ (Fluzone) 10/07/2024    Influenza, Unspecified " 10/04/2013    Influenza, seasonal, injectable 09/30/2009, 10/02/2020, 09/15/2023    Moderna SARS-CoV-2 Vaccination 04/07/2021, 05/05/2021, 01/08/2022    Pneumococcal polysaccharide vaccine, 23-valent, age 2 years and older (PNEUMOVAX 23) 10/02/2020, 10/02/2022    Td vaccine, age 7 years and older (TDVAX) 11/10/2020    Tdap vaccine, age 7 year and older (BOOSTRIX, ADACEL) 01/21/2013, 01/24/2013       RSV and shingles vaccines-can get at a pharmacy  Flu shot-today    fractures in lifetime-wrist, lf leg  FH osteoporosis-mom, mat gm    FH heart attack, heart surgery-none  FH stroke-none    The ASCVD Risk score (Nam BONILLA, et al., 2019) failed to calculate for the following reasons:    Cannot find a previous HDL lab    Cannot find a previous total cholesterol lab  Coronary Artery Calcium score:  This test is recommended for men 45 or older and women 55 or older without a history of heart disease and have 1 risk factor (high LDL cholesterol, low HDL cholesterol, high blood pressure, smoker (current or past), type 2 diabetes, IBD, lupus, RA, ankylosing spondylitis, psoriasis or family history of  heart disease <55yrs in dad, brother or child or <65yrs in mom, sister, or child.)       Review of Systems   Constitutional:  Negative for appetite change, chills, fatigue, fever and unexpected weight change.   HENT:  Negative for congestion, ear pain, sore throat and trouble swallowing.    Eyes:  Negative for pain, discharge and redness.   Respiratory:  Negative for cough and shortness of breath.    Cardiovascular:  Negative for chest pain and palpitations.   Gastrointestinal:  Negative for abdominal pain, blood in stool and vomiting.   Endocrine: Negative for polydipsia, polyphagia and polyuria.   Genitourinary:  Negative for dysuria, frequency, hematuria and urgency.   Musculoskeletal:  Negative for back pain and neck pain.   Skin:  Negative for rash and wound.        Cyst lf armpit   Allergic/Immunologic: Negative for  immunocompromised state.   Neurological:  Negative for dizziness, syncope and headaches.   Hematological:  Negative for adenopathy. Does not bruise/bleed easily.   Psychiatric/Behavioral:  Negative for confusion and hallucinations.        Objective   Visit Vitals  /85   Pulse 81   Temp 36.4 °C (97.5 °F)        BP Readings from Last 3 Encounters:   10/07/24 146/85   08/28/24 138/88   08/22/24 118/68     Wt Readings from Last 3 Encounters:   10/07/24 79.7 kg (175 lb 12.8 oz)   08/28/24 70.9 kg (156 lb 6.4 oz)   08/22/24 63.5 kg (140 lb)           Physical Exam  Constitutional:       General: She is not in acute distress.     Appearance: Normal appearance. She is not ill-appearing.   HENT:      Head: Normocephalic.      Right Ear: Tympanic membrane, ear canal and external ear normal.      Left Ear: Tympanic membrane, ear canal and external ear normal.      Nose: Nose normal.      Mouth/Throat:      Mouth: Mucous membranes are moist.      Pharynx: Oropharynx is clear.   Eyes:      Extraocular Movements: Extraocular movements intact.      Conjunctiva/sclera: Conjunctivae normal.      Pupils: Pupils are equal, round, and reactive to light.   Cardiovascular:      Rate and Rhythm: Normal rate and regular rhythm.      Heart sounds: Normal heart sounds. No murmur heard.  Pulmonary:      Effort: Pulmonary effort is normal. No respiratory distress.      Breath sounds: Normal breath sounds. No wheezing, rhonchi or rales.   Abdominal:      General: Bowel sounds are normal.      Palpations: Abdomen is soft. There is no mass.      Tenderness: There is no abdominal tenderness.   Musculoskeletal:         General: No swelling or tenderness. Normal range of motion.      Cervical back: Normal range of motion and neck supple.      Right lower leg: No edema.      Left lower leg: No edema.   Skin:     General: Skin is warm.      Findings: No rash.      Comments: 1cm lf armpit cyst. No redness or swelling. No pain to palpation; no  open area or drainage   Neurological:      General: No focal deficit present.      Mental Status: She is alert and oriented to person, place, and time.      Cranial Nerves: No cranial nerve deficit.      Motor: No weakness.   Psychiatric:         Mood and Affect: Mood normal.         Behavior: Behavior normal.       Assessment/Plan   Diagnoses and all orders for this visit:  Routine general medical examination at a health care facility  Microalbuminuria  Mixed hyperlipidemia  Encounter for screening mammogram for malignant neoplasm of breast  -     BI mammo bilateral screening tomosynthesis; Future  Other orders  -     Flu vaccine, trivalent, preservative free, HIGH-DOSE, age 65y+ (Fluzone)  -     Follow Up In Primary Care - Established; Future

## 2024-10-05 NOTE — PATIENT INSTRUCTIONS
Set up mammogram at [facility] [phone] or here [phone]    Set up colonoscopy with dr morrissey  [facility] [address] (11 mi[address]  [address]  [place] [address]  [phone]    See dr morrison for dizziness  [address]  [address]  [place] [address]  [phone]    See dr reese osborn for heart  [facility] (7 mi[address]  [address] [place] [address]  [phone]    See eye dr-dr barajas  [address], [place] [address]  Phone: [phone]    Keep liver dr appt 10/24/24  Keep blood dr appt 12/18/24    Do 24hr urine      Stop smoking    See derm  if pain to the lump or bigger  [facility] Le  [address] [place] [address]  Phone: [phone]    Buy some zyrtec 1 a day    Meds refilled. The number of refills on the meds match when you need to return to the office for an appt. I recommend making your next appt today so you don't run out of your medication as it may take me up to 3 days to refill it.    Handouts given to pt:  physical handout    stop smoking    Need records from:    I recommend signing up for MyChart.    Labs- No appt needed:    You can use the lab in our building when fasting. The hrs are: Mon-Fri 7a-330p.  No Saturday hrs. Bring the paper order.   OR   [facility] Mon-Fri 7a-12p. No Saturday hrs. Bring the paper order.  OR   [facility] Mon-Fri 630a-530p or Sat 6:30a-12p. Bring the paper order  OR   [facility] Mon-FrI 7a-5p, Sat 8a-12p  [facility] Mon-Fri 730a-4p, Sat  8a-12p  [facility] [address] Mon-Thurs 630a-6p , Fri 630a-4p, Sat 8a-12p  [facility] [address] [facility] [address]. Mon-Fri 7a-630p and Sat. 7a-3p    Fasting is no food, drink, gum or mints other than water for 12 hrs.   Results will be back in 2-3 business days for most labs. It is always recommended for any orders (labs, xrays, ultrasounds,MRI, ct scan, procedures  etc) to check with your insurance provider for expected costs or expenses to you.     Screenings:  Pap results back in 7-14 days.  To set up mammogram, call [phone]    You will get your results via phone from my medical assistant if you do not have MyChart.  OR  You will get your results via MyChart    If a result is urgent, I will call to speak to you.    Vaccines:  ---- flu vaccine    ---- Tdap    ---- Shingrix    ---- RSV vaccine    ---- [id number]    General recommendations:  Exercise-cardio 4-5d/wk 30min each day  Diet-Breakfast-toast (my favorite Jane Vasquez Delighful Multigrain or [employer] Killer Bread Good Seed thin-sliced)/bagel/English muffin-whole wheat flour as a 1st ingredient or cereal/oatmeal/granola bar-fiber 4g or more or protein like eggs or peanut butter; optional veggies  Lunch-protein, 1/2c carb or 2 slices bread, veg 1c  Dinner-protein, fist sized carb, veg 1c  Fruit 2 a day  Dairy 2 a day-milk, soy milk, almond milk, cheese, yogurt, cottage cheese  Snacks-Protein-hard boiled egg, nuts (walnuts/almonds/pecans/pistachios 1/4c), hummus, beef/deer jerky or meat sticks; vegetable, fruit, dairy-milk(1%, skim, almond, soy)/cheese (not a lot of cheddar)/yogurt (Greek is best-my favorite Dannon Fruit on the Bottom Greek)/cottage cheese 2%; triscuits/ popcorn/wheat thins have a lot of fiber; follow serving size on bag/box/container  increase water  Limit alcohol to 1 drink per day for women and 2 drinks per day for men (1 drink=12oz beer or 5oz wine or 1 1/2oz liquor)  Calcium: 500mg 1 twice a day if age 50 and younger and 600mg 1 twice a day if over age 50 (calcium citrate can be taken without food)  Vitamin D: 800-5000 IU/day  Limit salt to <2300mg a day if age 50 and under and <1500mg a day if over age 50/have high bp or diabetes or kidney disease  Recommend folate for childbearing age women 0.4mg per day (can be found in a multivitamin)  Recommend 18mg/dL of iron a day if age 50 and under and 8mg/dL  a day if over age 50; take on an empty stomach at bedtime  Use sunscreen   Wear seatbelt  Recommend safe sex practices: using condoms everytime you have sex, discuss with a new partner about their past partners/history of STDs/drug use, avoid drinking alcohol or using drugs as this increases the chance that you will participate in high-risk sex, for oral sex help protect your mouth by having your partner use a condom (male or female), women should not douche after sex, be aware of your partner's body and your body-look for signs of a sore, blister, rash, or discharge, and have regular exams and periodic tests for STDs.  No distracted driving  No driving when under influence of substances  Wear a seatbelt  Eye dr every 1-2yrs  Dentist every 6-12 mon  Tetanus shot every 10yrs  Recommend flu vaccine in the fall  Appt in 6mon for follow up on  and 1 year for physical      I will communicate with you via [employer] regarding messages and results. If you need help with this, you can call the support line at [phone].    IT WAS A PLEASURE TO SEE YOU TODAY. THANK YOU FOR CHOOSING US FOR YOUR HEALTHCARE NEEDS.

## 2024-10-07 ENCOUNTER — APPOINTMENT (OUTPATIENT)
Dept: PRIMARY CARE | Facility: CLINIC | Age: 69
End: 2024-10-07
Payer: MEDICARE

## 2024-10-07 VITALS
DIASTOLIC BLOOD PRESSURE: 85 MMHG | OXYGEN SATURATION: 92 % | TEMPERATURE: 97.5 F | HEART RATE: 81 BPM | SYSTOLIC BLOOD PRESSURE: 146 MMHG | BODY MASS INDEX: 27.59 KG/M2 | HEIGHT: 67 IN | WEIGHT: 175.8 LBS

## 2024-10-07 DIAGNOSIS — E78.2 MIXED HYPERLIPIDEMIA: ICD-10-CM

## 2024-10-07 DIAGNOSIS — R80.9 MICROALBUMINURIA: ICD-10-CM

## 2024-10-07 DIAGNOSIS — Z12.31 ENCOUNTER FOR SCREENING MAMMOGRAM FOR MALIGNANT NEOPLASM OF BREAST: ICD-10-CM

## 2024-10-07 DIAGNOSIS — Z00.00 ROUTINE GENERAL MEDICAL EXAMINATION AT A HEALTH CARE FACILITY: Primary | ICD-10-CM

## 2024-10-07 PROCEDURE — 3078F DIAST BP <80 MM HG: CPT | Performed by: NURSE PRACTITIONER

## 2024-10-07 PROCEDURE — 3044F HG A1C LEVEL LT 7.0%: CPT | Performed by: NURSE PRACTITIONER

## 2024-10-07 PROCEDURE — 90662 IIV NO PRSV INCREASED AG IM: CPT | Performed by: NURSE PRACTITIONER

## 2024-10-07 PROCEDURE — 3008F BODY MASS INDEX DOCD: CPT | Performed by: NURSE PRACTITIONER

## 2024-10-07 PROCEDURE — 1170F FXNL STATUS ASSESSED: CPT | Performed by: NURSE PRACTITIONER

## 2024-10-07 PROCEDURE — 1124F ACP DISCUSS-NO DSCNMKR DOCD: CPT | Performed by: NURSE PRACTITIONER

## 2024-10-07 PROCEDURE — 3077F SYST BP >= 140 MM HG: CPT | Performed by: NURSE PRACTITIONER

## 2024-10-07 PROCEDURE — G0008 ADMIN INFLUENZA VIRUS VAC: HCPCS | Performed by: NURSE PRACTITIONER

## 2024-10-07 PROCEDURE — 1160F RVW MEDS BY RX/DR IN RCRD: CPT | Performed by: NURSE PRACTITIONER

## 2024-10-07 PROCEDURE — G0439 PPPS, SUBSEQ VISIT: HCPCS | Performed by: NURSE PRACTITIONER

## 2024-10-07 PROCEDURE — 4004F PT TOBACCO SCREEN RCVD TLK: CPT | Performed by: NURSE PRACTITIONER

## 2024-10-07 PROCEDURE — 1159F MED LIST DOCD IN RCRD: CPT | Performed by: NURSE PRACTITIONER

## 2024-10-07 ASSESSMENT — ACTIVITIES OF DAILY LIVING (ADL)
DRESSING: INDEPENDENT
MANAGING_FINANCES: INDEPENDENT
GROCERY_SHOPPING: INDEPENDENT
TAKING_MEDICATION: INDEPENDENT
DOING_HOUSEWORK: INDEPENDENT
BATHING: INDEPENDENT

## 2024-10-07 ASSESSMENT — PATIENT HEALTH QUESTIONNAIRE - PHQ9
SUM OF ALL RESPONSES TO PHQ9 QUESTIONS 1 AND 2: 1
1. LITTLE INTEREST OR PLEASURE IN DOING THINGS: NOT AT ALL
2. FEELING DOWN, DEPRESSED OR HOPELESS: SEVERAL DAYS

## 2024-10-08 ENCOUNTER — APPOINTMENT (OUTPATIENT)
Dept: NEUROSURGERY | Facility: CLINIC | Age: 69
End: 2024-10-08
Payer: MEDICARE

## 2024-10-09 ENCOUNTER — TELEPHONE (OUTPATIENT)
Dept: OTHER | Age: 69
End: 2024-10-09
Payer: MEDICARE

## 2024-10-09 NOTE — TELEPHONE ENCOUNTER
Caller :pt, scheduled next visit, 10.15.24    Medication:  Clonazepam 1 mg    Pharmacy:  [phone]

## 2024-10-10 ENCOUNTER — TELEPHONE (OUTPATIENT)
Dept: PRIMARY CARE | Facility: CLINIC | Age: 69
End: 2024-10-10
Payer: MEDICARE

## 2024-10-10 DIAGNOSIS — F41.1 GAD (GENERALIZED ANXIETY DISORDER): ICD-10-CM

## 2024-10-10 RX ORDER — CLONAZEPAM 1 MG/1
TABLET ORAL
Qty: 45 TABLET | Refills: 0 | Status: SHIPPED | OUTPATIENT
Start: 2024-10-10

## 2024-10-10 NOTE — PROGRESS NOTES
Nonbillable note: coordinated care with patients pcp about patients , and recent appointment .ordered clonazepam to pharmacy after running oarrs , unable to reach patient via phone. Left a voicemail . She is on my schedule for next week . [facility] cns

## 2024-10-15 ENCOUNTER — APPOINTMENT (OUTPATIENT)
Dept: BEHAVIORAL HEALTH | Facility: CLINIC | Age: 69
End: 2024-10-15
Payer: MEDICARE

## 2024-10-16 ENCOUNTER — APPOINTMENT (OUTPATIENT)
Dept: VASCULAR SURGERY | Facility: CLINIC | Age: 69
End: 2024-10-16
Payer: MEDICARE

## 2024-10-24 ENCOUNTER — APPOINTMENT (OUTPATIENT)
Dept: GASTROENTEROLOGY | Facility: CLINIC | Age: 69
End: 2024-10-24
Payer: MEDICARE

## 2024-10-30 ENCOUNTER — TELEPHONE (OUTPATIENT)
Dept: NEUROSURGERY | Facility: CLINIC | Age: 69
End: 2024-10-30
Payer: MEDICARE

## 2024-11-04 ENCOUNTER — APPOINTMENT (OUTPATIENT)
Dept: PRIMARY CARE | Facility: CLINIC | Age: 69
End: 2024-11-04
Payer: MEDICARE

## 2024-11-05 ENCOUNTER — APPOINTMENT (OUTPATIENT)
Dept: BEHAVIORAL HEALTH | Facility: CLINIC | Age: 69
End: 2024-11-05
Payer: MEDICARE

## 2024-11-05 VITALS
DIASTOLIC BLOOD PRESSURE: 66 MMHG | TEMPERATURE: 98.5 F | RESPIRATION RATE: 18 BRPM | HEIGHT: 67 IN | BODY MASS INDEX: 26.3 KG/M2 | SYSTOLIC BLOOD PRESSURE: 141 MMHG | WEIGHT: 167.6 LBS | HEART RATE: 91 BPM

## 2024-11-05 DIAGNOSIS — F41.1 GAD (GENERALIZED ANXIETY DISORDER): ICD-10-CM

## 2024-11-05 DIAGNOSIS — F51.01 PRIMARY INSOMNIA: ICD-10-CM

## 2024-11-05 PROCEDURE — 3044F HG A1C LEVEL LT 7.0%: CPT | Performed by: CLINICAL NURSE SPECIALIST

## 2024-11-05 PROCEDURE — 1126F AMNT PAIN NOTED NONE PRSNT: CPT | Performed by: CLINICAL NURSE SPECIALIST

## 2024-11-05 PROCEDURE — 3008F BODY MASS INDEX DOCD: CPT | Performed by: CLINICAL NURSE SPECIALIST

## 2024-11-05 PROCEDURE — 1160F RVW MEDS BY RX/DR IN RCRD: CPT | Performed by: CLINICAL NURSE SPECIALIST

## 2024-11-05 PROCEDURE — 3078F DIAST BP <80 MM HG: CPT | Performed by: CLINICAL NURSE SPECIALIST

## 2024-11-05 PROCEDURE — 1159F MED LIST DOCD IN RCRD: CPT | Performed by: CLINICAL NURSE SPECIALIST

## 2024-11-05 PROCEDURE — 3077F SYST BP >= 140 MM HG: CPT | Performed by: CLINICAL NURSE SPECIALIST

## 2024-11-05 PROCEDURE — 99214 OFFICE O/P EST MOD 30 MIN: CPT | Performed by: CLINICAL NURSE SPECIALIST

## 2024-11-05 RX ORDER — ESCITALOPRAM OXALATE 20 MG/1
20 TABLET ORAL DAILY
Qty: 90 TABLET | Refills: 0 | Status: SHIPPED | OUTPATIENT
Start: 2024-11-05 | End: 2025-02-03

## 2024-11-05 RX ORDER — BUSPIRONE HYDROCHLORIDE 10 MG/1
TABLET ORAL
Qty: 450 TABLET | Refills: 1 | Status: SHIPPED | OUTPATIENT
Start: 2024-11-05

## 2024-11-05 RX ORDER — CLONAZEPAM 1 MG/1
TABLET ORAL
Qty: 45 TABLET | Refills: 0 | Status: SHIPPED | OUTPATIENT
Start: 2024-11-05

## 2024-11-05 RX ORDER — CLONAZEPAM 1 MG/1
TABLET ORAL
Qty: 45 TABLET | Refills: 0 | Status: SHIPPED | OUTPATIENT
Start: 2024-11-05 | End: 2024-11-05

## 2024-11-05 RX ORDER — TRAZODONE HYDROCHLORIDE 50 MG/1
TABLET ORAL
Qty: 180 TABLET | Refills: 0 | Status: SHIPPED | OUTPATIENT
Start: 2024-11-05

## 2024-11-05 ASSESSMENT — PAIN SCALES - GENERAL: PAINLEVEL_OUTOF10: 0-NO PAIN
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
## 2024-11-06 ENCOUNTER — TELEPHONE (OUTPATIENT)
Dept: PRIMARY CARE | Facility: CLINIC | Age: 69
End: 2024-11-06
Payer: MEDICARE

## 2024-11-06 DIAGNOSIS — R74.8 ELEVATED LIVER ENZYMES: Primary | ICD-10-CM

## 2024-11-06 DIAGNOSIS — E11.9 TYPE 2 DIABETES MELLITUS WITHOUT COMPLICATION, WITHOUT LONG-TERM CURRENT USE OF INSULIN (MULTI): ICD-10-CM

## 2024-11-06 NOTE — TELEPHONE ENCOUNTER
Jewell,  Before you call, I need 10/10/24 office note for dr vanegas-gi and pls print labs from [facility] 9/2024 til now    Pls clarify:  Is pt taking zetia and simvastatin?    They probably took her off the simvastatin due to high liver enzymes. Zetia doesn't incr liver enzymes.    (Liver dr, labs)

## 2024-11-06 NOTE — TELEPHONE ENCOUNTER
Patient has been on simvastatin for her cholesterol and when she was in rehab facility they gave her ezetimibe 10mg.  She is confused as to why she would be given 2 cholesterol medications.  She has stopped the zetia until she gets an answer from you.

## 2024-11-07 RX ORDER — EZETIMIBE 10 MG/1
10 TABLET ORAL DAILY
Qty: 90 TABLET | Refills: 4 | Status: SHIPPED | OUTPATIENT
Start: 2024-11-07

## 2024-11-07 NOTE — TELEPHONE ENCOUNTER
Pt needs zetia refill    Does not want to go to [facility]. Would rather  or [facility].     Informed on med change and instructions on labs

## 2024-11-07 NOTE — TELEPHONE ENCOUNTER
Suly meehan.  Referral already in for . Pt can call [phone] to schedule.  SW does not have liver tish

## 2024-11-07 NOTE — TELEPHONE ENCOUNTER
Pt should stop simvastatin until see liver   Stay on zetia.  Does she need a refill on the zetia?    Lab order in for liver enzymes; no fasting or appt needed.    See liver  at [facility] hepatology since  books out 4+mon  call [phone]

## 2024-11-11 ENCOUNTER — HOSPITAL ENCOUNTER (OUTPATIENT)
Dept: RADIOLOGY | Facility: EXTERNAL LOCATION | Age: 69
Discharge: HOME | End: 2024-11-11
Payer: MEDICARE

## 2024-11-11 DIAGNOSIS — Z12.31 ENCOUNTER FOR SCREENING MAMMOGRAM FOR MALIGNANT NEOPLASM OF BREAST: ICD-10-CM

## 2024-12-17 ENCOUNTER — TELEPHONE (OUTPATIENT)
Dept: OTHER | Age: 69
End: 2024-12-17
Payer: MEDICARE

## 2024-12-17 ENCOUNTER — DOCUMENTATION (OUTPATIENT)
Dept: BEHAVIORAL HEALTH | Facility: CLINIC | Age: 69
End: 2024-12-17
Payer: MEDICARE

## 2024-12-17 ENCOUNTER — TELEPHONE (OUTPATIENT)
Dept: BEHAVIORAL HEALTH | Facility: CLINIC | Age: 69
End: 2024-12-17
Payer: MEDICARE

## 2024-12-17 DIAGNOSIS — F41.1 GENERALIZED ANXIETY DISORDER: ICD-10-CM

## 2024-12-17 DIAGNOSIS — F41.1 GAD (GENERALIZED ANXIETY DISORDER): ICD-10-CM

## 2024-12-17 DIAGNOSIS — Z79.899 LONG-TERM CURRENT USE OF BENZODIAZEPINE: ICD-10-CM

## 2024-12-17 RX ORDER — CLONAZEPAM 1 MG/1
TABLET ORAL
Qty: 45 TABLET | Refills: 0 | Status: SHIPPED | OUTPATIENT
Start: 2024-12-17

## 2024-12-17 RX ORDER — CLONAZEPAM 1 MG/1
TABLET ORAL
Qty: 45 TABLET | Refills: 0 | Status: CANCELLED | OUTPATIENT
Start: 2024-12-17

## 2024-12-17 NOTE — PROGRESS NOTES
Nonbillable note : ordered uds after review of med order history and oarrs , no concerns on oarrs , patient requested script for benzodiazepine , refill request is appropriate. Communicated with nursing about the need for uds within one month kpacer cns

## 2024-12-17 NOTE — PROGRESS NOTES
Nonbillable note : ran oarrs , no concerns with oarrs , sent one month script to pharmacy for clonazepam , ordered uds kpacer cns

## 2024-12-18 ENCOUNTER — LAB (OUTPATIENT)
Dept: LAB | Facility: CLINIC | Age: 69
End: 2024-12-18
Payer: MEDICARE

## 2024-12-18 ENCOUNTER — OFFICE VISIT (OUTPATIENT)
Dept: HEMATOLOGY/ONCOLOGY | Facility: CLINIC | Age: 69
End: 2024-12-18
Payer: MEDICARE

## 2024-12-18 VITALS
RESPIRATION RATE: 18 BRPM | OXYGEN SATURATION: 96 % | HEART RATE: 72 BPM | BODY MASS INDEX: 27.13 KG/M2 | SYSTOLIC BLOOD PRESSURE: 166 MMHG | DIASTOLIC BLOOD PRESSURE: 72 MMHG | TEMPERATURE: 97.3 F | WEIGHT: 172.84 LBS | HEIGHT: 67 IN

## 2024-12-18 DIAGNOSIS — R79.89 ABNORMAL CBC: ICD-10-CM

## 2024-12-18 LAB
25(OH)D3 SERPL-MCNC: 29 NG/ML (ref 30–100)
ALBUMIN SERPL BCP-MCNC: 3.6 G/DL (ref 3.4–5)
ALP SERPL-CCNC: 121 U/L (ref 33–136)
ALT SERPL W P-5'-P-CCNC: 34 U/L (ref 7–45)
ANION GAP SERPL CALC-SCNC: 13 MMOL/L (ref 10–20)
AST SERPL W P-5'-P-CCNC: 24 U/L (ref 9–39)
BASOPHILS # BLD AUTO: 0.06 X10*3/UL (ref 0–0.1)
BASOPHILS NFR BLD AUTO: 0.7 %
BILIRUB SERPL-MCNC: 0.3 MG/DL (ref 0–1.2)
BUN SERPL-MCNC: 24 MG/DL (ref 6–23)
CALCIUM SERPL-MCNC: 9.2 MG/DL (ref 8.6–10.3)
CHLORIDE SERPL-SCNC: 105 MMOL/L (ref 98–107)
CO2 SERPL-SCNC: 26 MMOL/L (ref 21–32)
CREAT SERPL-MCNC: 0.58 MG/DL (ref 0.5–1.05)
EGFRCR SERPLBLD CKD-EPI 2021: >90 ML/MIN/1.73M*2
EOSINOPHIL # BLD AUTO: 0.09 X10*3/UL (ref 0–0.7)
EOSINOPHIL NFR BLD AUTO: 1.1 %
ERYTHROCYTE [DISTWIDTH] IN BLOOD BY AUTOMATED COUNT: 15.5 % (ref 11.5–14.5)
FERRITIN SERPL-MCNC: 20 NG/ML (ref 8–150)
FOLATE SERPL-MCNC: >24 NG/ML
GLUCOSE SERPL-MCNC: 148 MG/DL (ref 74–99)
HAPTOGLOB SERPL NEPH-MCNC: 209 MG/DL (ref 30–200)
HCT VFR BLD AUTO: 43.1 % (ref 36–46)
HGB BLD-MCNC: 13.6 G/DL (ref 12–16)
IMM GRANULOCYTES # BLD AUTO: 0.02 X10*3/UL (ref 0–0.7)
IMM GRANULOCYTES NFR BLD AUTO: 0.2 % (ref 0–0.9)
IRON SATN MFR SERPL: 5 % (ref 25–45)
IRON SERPL-MCNC: 25 UG/DL (ref 35–150)
LDH SERPL L TO P-CCNC: 205 U/L (ref 84–246)
LYMPHOCYTES # BLD AUTO: 1.27 X10*3/UL (ref 1.2–4.8)
LYMPHOCYTES NFR BLD AUTO: 15.7 %
MCH RBC QN AUTO: 31.7 PG (ref 26–34)
MCHC RBC AUTO-ENTMCNC: 31.6 G/DL (ref 32–36)
MCV RBC AUTO: 101 FL (ref 80–100)
MONOCYTES # BLD AUTO: 0.65 X10*3/UL (ref 0.1–1)
MONOCYTES NFR BLD AUTO: 8 %
NEUTROPHILS # BLD AUTO: 5.99 X10*3/UL (ref 1.2–7.7)
NEUTROPHILS NFR BLD AUTO: 74.3 %
NRBC BLD-RTO: 0 /100 WBCS (ref 0–0)
PLATELET # BLD AUTO: 350 X10*3/UL (ref 150–450)
POTASSIUM SERPL-SCNC: 4.5 MMOL/L (ref 3.5–5.3)
PROT SERPL-MCNC: 6.8 G/DL (ref 6.4–8.2)
PROT SERPL-MCNC: 7.1 G/DL (ref 6.4–8.2)
RBC # BLD AUTO: 4.29 X10*6/UL (ref 4–5.2)
SODIUM SERPL-SCNC: 139 MMOL/L (ref 136–145)
TIBC SERPL-MCNC: 459 UG/DL (ref 240–445)
TSH SERPL-ACNC: 3.32 MIU/L (ref 0.44–3.98)
UIBC SERPL-MCNC: 434 UG/DL (ref 110–370)
VIT B12 SERPL-MCNC: 658 PG/ML (ref 211–911)
WBC # BLD AUTO: 8.1 X10*3/UL (ref 4.4–11.3)

## 2024-12-18 PROCEDURE — 82746 ASSAY OF FOLIC ACID SERUM: CPT | Mod: PARLAB

## 2024-12-18 PROCEDURE — 86334 IMMUNOFIX E-PHORESIS SERUM: CPT | Mod: PARLAB

## 2024-12-18 PROCEDURE — 3044F HG A1C LEVEL LT 7.0%: CPT | Performed by: INTERNAL MEDICINE

## 2024-12-18 PROCEDURE — 3077F SYST BP >= 140 MM HG: CPT | Performed by: INTERNAL MEDICINE

## 2024-12-18 PROCEDURE — 3008F BODY MASS INDEX DOCD: CPT | Performed by: INTERNAL MEDICINE

## 2024-12-18 PROCEDURE — 83521 IG LIGHT CHAINS FREE EACH: CPT | Mod: PARLAB

## 2024-12-18 PROCEDURE — 82728 ASSAY OF FERRITIN: CPT | Mod: PARLAB

## 2024-12-18 PROCEDURE — 82607 VITAMIN B-12: CPT | Mod: PARLAB

## 2024-12-18 PROCEDURE — 1125F AMNT PAIN NOTED PAIN PRSNT: CPT | Performed by: INTERNAL MEDICINE

## 2024-12-18 PROCEDURE — 84443 ASSAY THYROID STIM HORMONE: CPT

## 2024-12-18 PROCEDURE — 36415 COLL VENOUS BLD VENIPUNCTURE: CPT

## 2024-12-18 PROCEDURE — 99214 OFFICE O/P EST MOD 30 MIN: CPT | Mod: 25 | Performed by: INTERNAL MEDICINE

## 2024-12-18 PROCEDURE — 81450 HL NEO GSAP 5-50DNA/DNA&RNA: CPT

## 2024-12-18 PROCEDURE — 99204 OFFICE O/P NEW MOD 45 MIN: CPT | Performed by: INTERNAL MEDICINE

## 2024-12-18 PROCEDURE — 3078F DIAST BP <80 MM HG: CPT | Performed by: INTERNAL MEDICINE

## 2024-12-18 PROCEDURE — 4004F PT TOBACCO SCREEN RCVD TLK: CPT | Performed by: INTERNAL MEDICINE

## 2024-12-18 PROCEDURE — 85025 COMPLETE CBC W/AUTO DIFF WBC: CPT

## 2024-12-18 PROCEDURE — 84155 ASSAY OF PROTEIN SERUM: CPT | Mod: 59,PARLAB

## 2024-12-18 PROCEDURE — 83615 LACTATE (LD) (LDH) ENZYME: CPT

## 2024-12-18 PROCEDURE — 83010 ASSAY OF HAPTOGLOBIN QUANT: CPT | Mod: PARLAB

## 2024-12-18 PROCEDURE — 80053 COMPREHEN METABOLIC PANEL: CPT

## 2024-12-18 PROCEDURE — 81256 HFE GENE: CPT

## 2024-12-18 PROCEDURE — 82306 VITAMIN D 25 HYDROXY: CPT | Mod: PARLAB

## 2024-12-18 PROCEDURE — 83540 ASSAY OF IRON: CPT

## 2024-12-18 ASSESSMENT — PATIENT HEALTH QUESTIONNAIRE - PHQ9
SUM OF ALL RESPONSES TO PHQ9 QUESTIONS 1 AND 2: 0
1. LITTLE INTEREST OR PLEASURE IN DOING THINGS: NOT AT ALL
2. FEELING DOWN, DEPRESSED OR HOPELESS: NOT AT ALL

## 2024-12-18 ASSESSMENT — PAIN SCALES - GENERAL: PAINLEVEL_OUTOF10: 6

## 2024-12-18 NOTE — PROGRESS NOTES
"Patient ID: Sweta Lim is a 69 y.o. female.  Referring Physician: LUIGI Chan  [address] [facility] [address]  [place] [address]  Primary Care Provider: LUIGI Chan  Visit Type: Initial Visit  The patient was referred to me for further evaluation and management of leukocytosis, elevated hemoglobin, hematocrit and elevated MCV.  Subjective    HPI  The patient is a 69-year-old woman with multiple medical problems as described such as anxiety, asthma, cervical spondylosis with myelopathy, COPD, depression, dysphagia GERD, rheumatoid arthritis hyperlipidemia hypothyroidism,  head tremors, sleep apnea, type 2 diabetes, vertigo and vision loss.  Other routine CBC revealed leukocytosis as well as slightly elevated hemoglobin and hematocrit.  The patient was referred for further evaluation and management.    At interview on December 18, 2024 the patient narrated entire history.  Denied history of fevers, night sweats, chest pain, shortness of breath at rest, nausea, vomiting, hematemesis, melena, hematochezia and hematuria.  Review of Systems   All other systems reviewed and are negative.       Objective   BSA: 1.93 meters squared  /72   Pulse 72   Temp 36.3 °C (97.3 °F)   Resp 18   Ht (S) 1.702 m (5' 7\")   Wt 78.4 kg (172 lb 13.5 oz)   SpO2 96%   BMI 27.07 kg/m²      has a past medical history of Acute frontal sinusitis, unspecified (01/21/2013), Anxiety, Asthma, Cervical disc disease, Chronic pain disorder, COPD (chronic obstructive pulmonary disease) (Multi), Depression, Dysphagia, Encounter for immunization (11/10/2020), GERD (gastroesophageal reflux disease), HL (hearing loss), Hyperlipidemia, Hypothyroidism, Personal history of other diseases of the musculoskeletal system and connective tissue, Personal history of other diseases of the nervous system and sense organs, Personal history of other diseases of the respiratory system, Personal history of other endocrine, " nutritional and metabolic disease, Restless legs syndrome, Sialoadenitis, unspecified (2021), Sialoadenitis, unspecified (2021), Sleep apnea, Type 2 diabetes mellitus, Vertigo, and Vision loss.   has a past surgical history that includes Neck surgery (10/19/2012); Lumbar fusion (2016);  section, classic (2016); and Other surgical history (2016).  Family History   Problem Relation Name Age of Onset    Hip fracture Mother      Dementia Mother      Hypertension Mother      Cancer Father      Cancer Maternal Grandmother      Diabetes Paternal Grandmother      Hypertension Other Grandparent     Diabetes Other       Oncology History    No history exists.       Sweta Lim  reports that she has been smoking cigarettes. She started smoking about 53 years ago. She has a 53 pack-year smoking history. She has never used smokeless tobacco.  She  reports that she does not currently use alcohol.  She  reports no history of drug use.  The patient used to drink heavily previously but claims to have quit about 15 years ago.  Physical Exam  Constitutional:       Appearance: Normal appearance. She is ill-appearing.   HENT:      Head: Normocephalic and atraumatic.      Nose: Nose normal.      Mouth/Throat:      Mouth: Mucous membranes are moist.      Pharynx: Oropharynx is clear.   Eyes:      Extraocular Movements: Extraocular movements intact.      Conjunctiva/sclera: Conjunctivae normal.      Pupils: Pupils are equal, round, and reactive to light.   Cardiovascular:      Rate and Rhythm: Normal rate and regular rhythm.   Pulmonary:      Effort: Pulmonary effort is normal.      Breath sounds: Normal breath sounds.   Abdominal:      General: Abdomen is flat. Bowel sounds are normal.      Palpations: Abdomen is soft.   Musculoskeletal:         General: Normal range of motion.      Cervical back: Normal range of motion and neck supple.   Neurological:      General: No focal deficit present.       Mental Status: She is alert and oriented to person, place, and time. Mental status is at baseline.   Psychiatric:         Mood and Affect: Mood normal.         Behavior: Behavior normal.         Thought Content: Thought content normal.         Judgment: Judgment normal.     Had tremors, smells of cigarette smoke    WBC   Date/Time Value Ref Range Status   07/25/2024 07:59 AM 7.1 4.4 - 11.3 x10*3/uL Final   07/24/2024 09:08 AM 6.1 4.4 - 11.3 x10*3/uL Final   07/23/2024 09:56 AM 6.9 4.4 - 11.3 x10*3/uL Final     nRBC   Date Value Ref Range Status   07/25/2024 0.0 0.0 - 0.0 /100 WBCs Final   07/24/2024 0.0 0.0 - 0.0 /100 WBCs Final   07/23/2024 0.0 0.0 - 0.0 /100 WBCs Final     RBC   Date Value Ref Range Status   07/25/2024 4.00 4.00 - 5.20 x10*6/uL Final   07/24/2024 4.10 4.00 - 5.20 x10*6/uL Final   07/23/2024 3.92 (L) 4.00 - 5.20 x10*6/uL Final     Hemoglobin   Date Value Ref Range Status   07/25/2024 13.5 12.0 - 16.0 g/dL Final   07/24/2024 13.9 12.0 - 16.0 g/dL Final   07/23/2024 13.2 12.0 - 16.0 g/dL Final     Hematocrit   Date Value Ref Range Status   07/25/2024 41.9 36.0 - 46.0 % Final   07/24/2024 42.4 36.0 - 46.0 % Final   07/23/2024 40.3 36.0 - 46.0 % Final     MCV   Date/Time Value Ref Range Status   07/25/2024 07:59  (H) 80 - 100 fL Final   07/24/2024 09:08  (H) 80 - 100 fL Final   07/23/2024 09:56  (H) 80 - 100 fL Final     MCH   Date/Time Value Ref Range Status   07/25/2024 07:59 AM 33.8 26.0 - 34.0 pg Final   07/24/2024 09:08 AM 33.9 26.0 - 34.0 pg Final   07/23/2024 09:56 AM 33.7 26.0 - 34.0 pg Final     MCHC   Date/Time Value Ref Range Status   07/25/2024 07:59 AM 32.2 32.0 - 36.0 g/dL Final   07/24/2024 09:08 AM 32.8 32.0 - 36.0 g/dL Final   07/23/2024 09:56 AM 32.8 32.0 - 36.0 g/dL Final     RDW   Date/Time Value Ref Range Status   07/25/2024 07:59 AM 15.1 (H) 11.5 - 14.5 % Final   07/24/2024 09:08 AM 14.6 (H) 11.5 - 14.5 % Final   07/23/2024 09:56 AM 14.6 (H) 11.5 - 14.5 % Final  "    Platelets   Date/Time Value Ref Range Status   07/25/2024 07:59  150 - 450 x10*3/uL Final   07/24/2024 09:08  150 - 450 x10*3/uL Final   07/23/2024 09:56  150 - 450 x10*3/uL Final     No results found for: \"MPV\"  Neutrophils %   Date/Time Value Ref Range Status   07/25/2024 07:59 AM 61.5 40.0 - 80.0 % Final   07/24/2024 09:08 AM 69.5 40.0 - 80.0 % Final   07/23/2024 09:56 AM 76.5 40.0 - 80.0 % Final     Immature Granulocytes %, Automated   Date/Time Value Ref Range Status   07/25/2024 07:59 AM 0.7 0.0 - 0.9 % Final     Comment:     Immature Granulocyte Count (IG) includes promyelocytes, myelocytes and metamyelocytes but does not include bands. Percent differential counts (%) should be interpreted in the context of the absolute cell counts (cells/UL).   07/24/2024 09:08 AM 0.7 0.0 - 0.9 % Final     Comment:     Immature Granulocyte Count (IG) includes promyelocytes, myelocytes and metamyelocytes but does not include bands. Percent differential counts (%) should be interpreted in the context of the absolute cell counts (cells/UL).   07/23/2024 09:56 AM 0.4 0.0 - 0.9 % Final     Comment:     Immature Granulocyte Count (IG) includes promyelocytes, myelocytes and metamyelocytes but does not include bands. Percent differential counts (%) should be interpreted in the context of the absolute cell counts (cells/UL).     Lymphocytes %   Date/Time Value Ref Range Status   07/25/2024 07:59 AM 24.2 13.0 - 44.0 % Final   07/24/2024 09:08 AM 19.6 13.0 - 44.0 % Final   07/23/2024 09:56 AM 16.4 13.0 - 44.0 % Final     Monocytes %   Date/Time Value Ref Range Status   07/25/2024 07:59 AM 11.5 2.0 - 10.0 % Final   07/24/2024 09:08 AM 9.5 2.0 - 10.0 % Final   07/23/2024 09:56 AM 6.5 2.0 - 10.0 % Final     Eosinophils %   Date/Time Value Ref Range Status   07/25/2024 07:59 AM 1.3 0.0 - 6.0 % Final   07/24/2024 09:08 AM 0.2 0.0 - 6.0 % Final   07/23/2024 09:56 AM 0.1 0.0 - 6.0 % Final     Basophils %   Date/Time " "Value Ref Range Status   07/25/2024 07:59 AM 0.8 0.0 - 2.0 % Final   07/24/2024 09:08 AM 0.5 0.0 - 2.0 % Final   07/23/2024 09:56 AM 0.1 0.0 - 2.0 % Final     Neutrophils Absolute   Date/Time Value Ref Range Status   07/25/2024 07:59 AM 4.36 1.20 - 7.70 x10*3/uL Final     Comment:     Percent differential counts (%) should be interpreted in the context of the absolute cell counts (cells/uL).   07/24/2024 09:08 AM 4.26 1.20 - 7.70 x10*3/uL Final     Comment:     Percent differential counts (%) should be interpreted in the context of the absolute cell counts (cells/uL).   07/23/2024 09:56 AM 5.26 1.20 - 7.70 x10*3/uL Final     Comment:     Percent differential counts (%) should be interpreted in the context of the absolute cell counts (cells/uL).     Immature Granulocytes Absolute, Automated   Date/Time Value Ref Range Status   07/25/2024 07:59 AM 0.05 0.00 - 0.70 x10*3/uL Final   07/24/2024 09:08 AM 0.04 0.00 - 0.70 x10*3/uL Final   07/23/2024 09:56 AM 0.03 0.00 - 0.70 x10*3/uL Final     Lymphocytes Absolute   Date/Time Value Ref Range Status   07/25/2024 07:59 AM 1.72 1.20 - 4.80 x10*3/uL Final   07/24/2024 09:08 AM 1.20 1.20 - 4.80 x10*3/uL Final   07/23/2024 09:56 AM 1.13 (L) 1.20 - 4.80 x10*3/uL Final     Monocytes Absolute   Date/Time Value Ref Range Status   07/25/2024 07:59 AM 0.82 0.10 - 1.00 x10*3/uL Final   07/24/2024 09:08 AM 0.58 0.10 - 1.00 x10*3/uL Final   07/23/2024 09:56 AM 0.45 0.10 - 1.00 x10*3/uL Final     Eosinophils Absolute   Date/Time Value Ref Range Status   07/25/2024 07:59 AM 0.09 0.00 - 0.70 x10*3/uL Final   07/24/2024 09:08 AM 0.01 0.00 - 0.70 x10*3/uL Final   07/23/2024 09:56 AM 0.01 0.00 - 0.70 x10*3/uL Final     Basophils Absolute   Date/Time Value Ref Range Status   07/25/2024 07:59 AM 0.06 0.00 - 0.10 x10*3/uL Final   07/24/2024 09:08 AM 0.03 0.00 - 0.10 x10*3/uL Final   07/23/2024 09:56 AM 0.01 0.00 - 0.10 x10*3/uL Final       No components found for: \"PT\"  aPTT   Date/Time Value Ref " Range Status   07/19/2024 06:55 AM 31 27 - 38 seconds Final   07/01/2024 02:50 PM 29 27 - 38 seconds Final       Assessment/Plan    The patient is a 69-year-old woman with multiple medical problems as described above referred for further evaluation and management of abnormal blood count such as leukocytosis, elevated hemoglobin and hematocrit.  Physical examination revealed head tremors and smell of cigarette smoke.    Differential diagnosis of elevated WBC hemoglobin and hematocrit include primary causes such as myeloproliferative disease secondary causes such as infection, inflammation, drug-induced, cigarette smoking, chronic hypoxia versus others.  I have recommended hereditary hemochromatosis mutation studies, MPN studies, iron indicis, vitamin B12, folate, TSH levels and ultrasound of abdomen to evaluate further.  The patient understood appreciated all the details provided and was grateful.    Thank you for allowing me to participate in care of your patient if you have any questions please feel free to call me.     Problem List Items Addressed This Visit       Abnormal CBC    Relevant Orders    CBC and Auto Differential    Ferritin    Comprehensive Metabolic Panel    Folate    Haptoglobin    Iron and TIBC    Lactate Dehydrogenase    TSH with reflex to Free T4 if abnormal    Vitamin B12    Vitamin D 25-Hydroxy,Total (for eval of Vitamin D levels)    Hemochromatosis Mutation Analysis    Myeloid Malignancies Panel    Esmond/Lambda Free Light Chain, Serum    Serum Protein Electrophoresis + Immunofixation    US abdomen complete    Clinic Appointment Request Follow Up; HAKEEM PHELAN MD

## 2024-12-19 LAB
KAPPA LC SERPL-MCNC: 2.78 MG/DL (ref 0.33–1.94)
KAPPA LC/LAMBDA SER: 1.53 {RATIO} (ref 0.26–1.65)
LAMBDA LC SERPL-MCNC: 1.82 MG/DL (ref 0.57–2.63)

## 2024-12-20 ENCOUNTER — APPOINTMENT (OUTPATIENT)
Dept: RADIOLOGY | Facility: HOSPITAL | Age: 69
End: 2024-12-20
Payer: MEDICARE

## 2024-12-20 ENCOUNTER — HOSPITAL ENCOUNTER (OUTPATIENT)
Dept: RADIOLOGY | Facility: HOSPITAL | Age: 69
Discharge: HOME | End: 2024-12-20
Payer: MEDICARE

## 2024-12-20 DIAGNOSIS — R79.89 ABNORMAL CBC: ICD-10-CM

## 2024-12-20 PROCEDURE — 76700 US EXAM ABDOM COMPLETE: CPT

## 2024-12-23 ENCOUNTER — APPOINTMENT (OUTPATIENT)
Dept: GASTROENTEROLOGY | Facility: CLINIC | Age: 69
End: 2024-12-23
Payer: MEDICARE

## 2024-12-23 LAB
ALBUMIN: 3.6 G/DL (ref 3.4–5)
ALPHA 1 GLOBULIN: 0.3 G/DL (ref 0.2–0.6)
ALPHA 2 GLOBULIN: 0.8 G/DL (ref 0.4–1.1)
BETA GLOBULIN: 1.2 G/DL (ref 0.5–1.2)
ELECTRONICALLY SIGNED BY: NORMAL
GAMMA GLOBULIN: 1.2 G/DL (ref 0.5–1.4)
HFE GENE MUT TESTED BLD/T: NORMAL
HFE P.C282Y BLD/T QL: NORMAL
HFE P.H63D BLD/T QL: NORMAL
IMMUNOFIXATION COMMENT: ABNORMAL
M-PROTEIN 1: 0.2 G/DL
PATH REVIEW - SERUM IMMUNOFIXATION: ABNORMAL
PATH REVIEW-SERUM PROTEIN ELECTROPHORESIS: ABNORMAL
PROTEIN ELECTROPHORESIS COMMENT: ABNORMAL

## 2024-12-24 LAB
ELECTRONICALLY SIGNED BY: NORMAL
MYELOID NGS RESULTS: NORMAL

## 2025-01-01 DIAGNOSIS — J45.20 MILD INTERMITTENT ASTHMA WITHOUT COMPLICATION (HHS-HCC): ICD-10-CM

## 2025-01-02 RX ORDER — ALBUTEROL SULFATE 90 UG/1
2 INHALANT RESPIRATORY (INHALATION) EVERY 6 HOURS PRN
Qty: 18 G | Refills: 1 | Status: SHIPPED | OUTPATIENT
Start: 2025-01-02 | End: 2026-01-02

## 2025-01-10 ENCOUNTER — TELEPHONE (OUTPATIENT)
Dept: OTHER | Age: 70
End: 2025-01-10
Payer: MEDICARE

## 2025-01-10 ENCOUNTER — TELEPHONE (OUTPATIENT)
Dept: BEHAVIORAL HEALTH | Facility: CLINIC | Age: 70
End: 2025-01-10
Payer: MEDICARE

## 2025-01-10 NOTE — TELEPHONE ENCOUNTER
Sharon called to confirm if and where the provider would like her to get a drug test done at. Patient would like a call back at [phone] because her mychart isn't working.

## 2025-01-15 ENCOUNTER — LAB (OUTPATIENT)
Dept: LAB | Facility: LAB | Age: 70
End: 2025-01-15
Payer: MEDICARE

## 2025-01-15 DIAGNOSIS — Z79.899 LONG-TERM CURRENT USE OF BENZODIAZEPINE: ICD-10-CM

## 2025-01-15 PROCEDURE — 80345 DRUG SCREENING BARBITURATES: CPT

## 2025-01-15 PROCEDURE — 80349 CANNABINOIDS NATURAL: CPT

## 2025-01-15 PROCEDURE — 80307 DRUG TEST PRSMV CHEM ANLYZR: CPT

## 2025-01-16 ENCOUNTER — DOCUMENTATION (OUTPATIENT)
Dept: BEHAVIORAL HEALTH | Facility: CLINIC | Age: 70
End: 2025-01-16
Payer: MEDICARE

## 2025-01-16 ENCOUNTER — TELEPHONE (OUTPATIENT)
Dept: BEHAVIORAL HEALTH | Facility: CLINIC | Age: 70
End: 2025-01-16
Payer: MEDICARE

## 2025-01-16 DIAGNOSIS — F41.1 GAD (GENERALIZED ANXIETY DISORDER): ICD-10-CM

## 2025-01-16 LAB
AMPHETAMINES UR QL SCN: ABNORMAL
BARBITURATES UR QL SCN: ABNORMAL
BENZODIAZ UR QL SCN: ABNORMAL
BZE UR QL SCN: ABNORMAL
CANNABINOIDS UR QL SCN: ABNORMAL
FENTANYL+NORFENTANYL UR QL SCN: ABNORMAL
METHADONE UR QL SCN: ABNORMAL
OPIATES UR QL SCN: ABNORMAL
OXYCODONE+OXYMORPHONE UR QL SCN: ABNORMAL
PCP UR QL SCN: ABNORMAL

## 2025-01-16 RX ORDER — CLONAZEPAM 1 MG/1
TABLET ORAL
Qty: 45 TABLET | Refills: 0 | Status: SHIPPED | OUTPATIENT
Start: 2025-01-16

## 2025-01-16 NOTE — PROGRESS NOTES
Nonbillable note : sent script for clonazepam after running oarrs today ( no concerns )  , and message voicemail from patient indicating there is a need for a script . She is scheduled for follow up .kpacer cns

## 2025-01-20 ENCOUNTER — APPOINTMENT (OUTPATIENT)
Dept: HEMATOLOGY/ONCOLOGY | Facility: CLINIC | Age: 70
End: 2025-01-20
Payer: MEDICARE

## 2025-01-20 LAB — CARBOXYTHC UR-MCNC: >500 NG/ML

## 2025-01-21 ENCOUNTER — APPOINTMENT (OUTPATIENT)
Facility: CLINIC | Age: 70
End: 2025-01-21
Payer: MEDICARE

## 2025-01-22 LAB
BUTALBITAL, URN, QUANT: <50 NG/ML
PENTOBARBITAL, URN, QUANT: <50 NG/ML
PHENOBARBITAL, URN, QUANT: 3606 NG/ML

## 2025-01-28 ENCOUNTER — DOCUMENTATION (OUTPATIENT)
Dept: BEHAVIORAL HEALTH | Facility: CLINIC | Age: 70
End: 2025-01-28

## 2025-01-28 ENCOUNTER — APPOINTMENT (OUTPATIENT)
Dept: BEHAVIORAL HEALTH | Facility: CLINIC | Age: 70
End: 2025-01-28
Payer: MEDICARE

## 2025-01-28 NOTE — PROGRESS NOTES
Nonbillable note : patient left writer a voicemail that she needs to cancel todays appointment due to having fallen yesterday and having difficulty walking . Attempted to reach patient via phone , it went to voicemail , left a message that I hope she heals quickly and that she should contact her primary care dr. Also left my voicemail number for the patient to return my call .  Kp[facility] cns

## 2025-02-04 ENCOUNTER — APPOINTMENT (OUTPATIENT)
Dept: BEHAVIORAL HEALTH | Facility: CLINIC | Age: 70
End: 2025-02-04
Payer: MEDICARE

## 2025-02-11 ENCOUNTER — HOSPITAL ENCOUNTER (OUTPATIENT)
Dept: RADIOLOGY | Facility: HOSPITAL | Age: 70
Discharge: HOME | End: 2025-02-11
Payer: MEDICARE

## 2025-02-11 DIAGNOSIS — Z98.1 S/P CERVICAL SPINAL FUSION: ICD-10-CM

## 2025-02-11 PROCEDURE — 72040 X-RAY EXAM NECK SPINE 2-3 VW: CPT | Performed by: RADIOLOGY

## 2025-02-11 PROCEDURE — 72040 X-RAY EXAM NECK SPINE 2-3 VW: CPT

## 2025-02-12 ENCOUNTER — APPOINTMENT (OUTPATIENT)
Dept: NEUROSURGERY | Facility: CLINIC | Age: 70
End: 2025-02-12
Payer: MEDICARE

## 2025-02-12 VITALS — BODY MASS INDEX: 26.87 KG/M2 | HEIGHT: 67 IN | WEIGHT: 171.2 LBS | TEMPERATURE: 97.6 F

## 2025-02-12 DIAGNOSIS — Z98.1 S/P CERVICAL SPINAL FUSION: ICD-10-CM

## 2025-02-12 DIAGNOSIS — G95.9 CERVICAL MYELOPATHY: Primary | ICD-10-CM

## 2025-02-12 PROCEDURE — 3008F BODY MASS INDEX DOCD: CPT | Performed by: PHYSICIAN ASSISTANT

## 2025-02-12 PROCEDURE — 1125F AMNT PAIN NOTED PAIN PRSNT: CPT | Performed by: PHYSICIAN ASSISTANT

## 2025-02-12 PROCEDURE — 99213 OFFICE O/P EST LOW 20 MIN: CPT | Performed by: PHYSICIAN ASSISTANT

## 2025-02-12 ASSESSMENT — PATIENT HEALTH QUESTIONNAIRE - PHQ9
1. LITTLE INTEREST OR PLEASURE IN DOING THINGS: SEVERAL DAYS
2. FEELING DOWN, DEPRESSED OR HOPELESS: SEVERAL DAYS
10. IF YOU CHECKED OFF ANY PROBLEMS, HOW DIFFICULT HAVE THESE PROBLEMS MADE IT FOR YOU TO DO YOUR WORK, TAKE CARE OF THINGS AT HOME, OR GET ALONG WITH OTHER PEOPLE: NOT DIFFICULT AT ALL
SUM OF ALL RESPONSES TO PHQ9 QUESTIONS 1 & 2: 2

## 2025-02-12 ASSESSMENT — PAIN SCALES - GENERAL: PAINLEVEL_OUTOF10: 6
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
## 2025-02-17 ENCOUNTER — DOCUMENTATION (OUTPATIENT)
Dept: BEHAVIORAL HEALTH | Facility: CLINIC | Age: 70
End: 2025-02-17
Payer: MEDICARE

## 2025-02-17 DIAGNOSIS — F41.1 GAD (GENERALIZED ANXIETY DISORDER): ICD-10-CM

## 2025-02-17 RX ORDER — CLONAZEPAM 1 MG/1
TABLET ORAL
Qty: 45 TABLET | Refills: 0 | Status: SHIPPED | OUTPATIENT
Start: 2025-02-17

## 2025-02-17 NOTE — PROGRESS NOTES
Nonbillable note : sent script to patients pharmacy for clonazepam per her request, after review of med order history and oarrs ran today . No concerns on oarrs . She is scheduled for follow up in mid march . Reviewed note in [facility] emr per recent neurosurgery appointment kpacer cns   
(4) completely dependent

## 2025-02-18 ENCOUNTER — APPOINTMENT (OUTPATIENT)
Dept: HEMATOLOGY/ONCOLOGY | Facility: CLINIC | Age: 70
End: 2025-02-18
Payer: MEDICARE

## 2025-02-18 DIAGNOSIS — J45.20 MILD INTERMITTENT ASTHMA WITHOUT COMPLICATION (HHS-HCC): ICD-10-CM

## 2025-02-18 RX ORDER — ALBUTEROL SULFATE 90 UG/1
2 INHALANT RESPIRATORY (INHALATION) EVERY 6 HOURS PRN
Qty: 18 G | Refills: 1 | Status: SHIPPED | OUTPATIENT
Start: 2025-02-18 | End: 2026-02-18

## 2025-03-11 ENCOUNTER — APPOINTMENT (OUTPATIENT)
Dept: BEHAVIORAL HEALTH | Facility: CLINIC | Age: 70
End: 2025-03-11
Payer: MEDICARE

## 2025-03-17 ENCOUNTER — DOCUMENTATION (OUTPATIENT)
Dept: BEHAVIORAL HEALTH | Facility: CLINIC | Age: 70
End: 2025-03-17
Payer: MEDICARE

## 2025-03-17 ENCOUNTER — OFFICE VISIT (OUTPATIENT)
Dept: HEMATOLOGY/ONCOLOGY | Facility: CLINIC | Age: 70
End: 2025-03-17
Payer: MEDICARE

## 2025-03-17 VITALS
OXYGEN SATURATION: 96 % | HEART RATE: 87 BPM | SYSTOLIC BLOOD PRESSURE: 150 MMHG | WEIGHT: 172.18 LBS | DIASTOLIC BLOOD PRESSURE: 77 MMHG | RESPIRATION RATE: 18 BRPM | TEMPERATURE: 96.8 F | BODY MASS INDEX: 26.97 KG/M2

## 2025-03-17 DIAGNOSIS — E61.1 IRON DEFICIENCY: ICD-10-CM

## 2025-03-17 DIAGNOSIS — R79.89 ABNORMAL CBC: ICD-10-CM

## 2025-03-17 DIAGNOSIS — R93.5 ABNORMAL US (ULTRASOUND) OF ABDOMEN: ICD-10-CM

## 2025-03-17 DIAGNOSIS — F41.1 GAD (GENERALIZED ANXIETY DISORDER): ICD-10-CM

## 2025-03-17 PROCEDURE — G2211 COMPLEX E/M VISIT ADD ON: HCPCS | Performed by: INTERNAL MEDICINE

## 2025-03-17 PROCEDURE — 99215 OFFICE O/P EST HI 40 MIN: CPT | Performed by: INTERNAL MEDICINE

## 2025-03-17 PROCEDURE — 1159F MED LIST DOCD IN RCRD: CPT | Performed by: INTERNAL MEDICINE

## 2025-03-17 PROCEDURE — 3077F SYST BP >= 140 MM HG: CPT | Performed by: INTERNAL MEDICINE

## 2025-03-17 PROCEDURE — 1126F AMNT PAIN NOTED NONE PRSNT: CPT | Performed by: INTERNAL MEDICINE

## 2025-03-17 PROCEDURE — 3078F DIAST BP <80 MM HG: CPT | Performed by: INTERNAL MEDICINE

## 2025-03-17 RX ORDER — CLONAZEPAM 1 MG/1
TABLET ORAL
Qty: 45 TABLET | Refills: 0 | Status: SHIPPED | OUTPATIENT
Start: 2025-03-17

## 2025-03-17 ASSESSMENT — PAIN SCALES - GENERAL: PAINLEVEL_OUTOF10: 0-NO PAIN

## 2025-03-17 NOTE — PROGRESS NOTES
Nonbillable note : reviewed med order history in [facility] emr when patient left voicemail of refill need for clonazepam . Sent script to pharmacy . Oarrs reviewed , no concern on oarrs. . Patient is scheduled for follow up appointment early April [facility] cns

## 2025-03-17 NOTE — PROGRESS NOTES
Patient ID: Sweta Lim is a 69 y.o. female.  Referring Physician: Kun Miranda MD  [address] [facility], [place] [address]  Primary Care Provider: LUIGI Chan  Visit Type: Initial Visit  Hemochromatosis studies and MPN studies were negative.  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 DNMT3A mutations may indicate clonal hematopoiesis when myelodysplastic syndrome (MDS) is suspected, but they should not be used as presumptive evidence of MDS when other diagnostic criteria for MDS have not been met (NCCN 1.2021). DNMT3A mutations may be associated with a decreased overall survival in MDS (NCCN 1.2021).   The role of DNMT3A mutations in risk stratification of patients with AML has not yet been clearly defined, but has been reported to be associated with shorter survival in some studies (NCCN1.2021).     DISEASE RELEVANT ALTERATIONS NOT DETECTED:   Negative for JAK2 V617F.  Negative for JAK2 exon 12 mutation.  Negative for CALR mutation.  Negative for MPL mutation.    The patient was referred to me for further evaluation and management of leukocytosis, elevated hemoglobin, hematocrit and elevated MCV.  Subjective    HPI  The patient is a 69-year-old woman with multiple medical problems as described such as anxiety, asthma, cervical spondylosis with myelopathy, COPD, depression, dysphagia GERD, rheumatoid arthritis hyperlipidemia hypothyroidism,  head tremors, sleep apnea, type 2 diabetes, vertigo and vision loss.  Other routine CBC revealed leukocytosis as well as slightly elevated hemoglobin and hematocrit.  The patient was referred for further evaluation and management.    At interview on December 18, 2024 the patient narrated entire history.  Denied history of fevers, night sweats, chest pain, shortness of  breath at rest, nausea, vomiting, hematemesis, melena, hematochezia and hematuria.    The patient had come for follow-up on 2025 regarding history of elevated hemoglobin hematocrit.  Review of Systems   All other systems reviewed and are negative.       Objective   BSA: 1.92 meters squared  /77   Pulse 87   Temp 36 °C (96.8 °F)   Resp 18   Wt 78.1 kg (172 lb 2.9 oz)   SpO2 96%   BMI 26.97 kg/m²      has a past medical history of Acute frontal sinusitis, unspecified (2013), Anxiety, Asthma, Cervical disc disease, Chronic pain disorder, COPD (chronic obstructive pulmonary disease) (Multi), Depression, Dysphagia, Encounter for immunization (11/10/2020), GERD (gastroesophageal reflux disease), HL (hearing loss), Hyperlipidemia, Hypothyroidism, Personal history of other diseases of the musculoskeletal system and connective tissue, Personal history of other diseases of the nervous system and sense organs, Personal history of other diseases of the respiratory system, Personal history of other endocrine, nutritional and metabolic disease, Restless legs syndrome, Sialoadenitis, unspecified (2021), Sialoadenitis, unspecified (2021), Sleep apnea, Type 2 diabetes mellitus, Vertigo, and Vision loss.   has a past surgical history that includes Neck surgery (10/19/2012); Lumbar fusion (2016);  section, classic (2016); and Other surgical history (2016).  Family History   Problem Relation Name Age of Onset    Hip fracture Mother      Dementia Mother      Hypertension Mother      Cancer Father      Cancer Maternal Grandmother      Diabetes Paternal Grandmother      Hypertension Other Grandparent     Diabetes Other       Oncology History    No history exists.       Sweta GRECIA Lim  reports that she has been smoking cigarettes. She started smoking about 53 years ago. She has a 53.2 pack-year smoking history. She has never used smokeless tobacco.  She  reports that she  does not currently use alcohol.  She  reports no history of drug use.  The patient used to drink heavily previously but claims to have quit about 15 years ago.  Physical Exam  Constitutional:       Appearance: Normal appearance. She is ill-appearing.   HENT:      Head: Normocephalic and atraumatic.      Nose: Nose normal.      Mouth/Throat:      Mouth: Mucous membranes are moist.      Pharynx: Oropharynx is clear.   Eyes:      Extraocular Movements: Extraocular movements intact.      Conjunctiva/sclera: Conjunctivae normal.      Pupils: Pupils are equal, round, and reactive to light.   Cardiovascular:      Rate and Rhythm: Normal rate and regular rhythm.   Pulmonary:      Effort: Pulmonary effort is normal.      Breath sounds: Normal breath sounds.   Abdominal:      General: Abdomen is flat. Bowel sounds are normal.      Palpations: Abdomen is soft.   Musculoskeletal:         General: Normal range of motion.      Cervical back: Normal range of motion and neck supple.   Neurological:      General: No focal deficit present.      Mental Status: She is alert and oriented to person, place, and time. Mental status is at baseline.   Psychiatric:         Mood and Affect: Mood normal.         Behavior: Behavior normal.         Thought Content: Thought content normal.         Judgment: Judgment normal.     Had tremors, smells of cigarette smoke    WBC   Date/Time Value Ref Range Status   12/18/2024 11:52 AM 8.1 4.4 - 11.3 x10*3/uL Final   07/25/2024 07:59 AM 7.1 4.4 - 11.3 x10*3/uL Final   07/24/2024 09:08 AM 6.1 4.4 - 11.3 x10*3/uL Final     nRBC   Date Value Ref Range Status   12/18/2024 0.0 0.0 - 0.0 /100 WBCs Final   07/25/2024 0.0 0.0 - 0.0 /100 WBCs Final   07/24/2024 0.0 0.0 - 0.0 /100 WBCs Final     RBC   Date Value Ref Range Status   12/18/2024 4.29 4.00 - 5.20 x10*6/uL Final   07/25/2024 4.00 4.00 - 5.20 x10*6/uL Final   07/24/2024 4.10 4.00 - 5.20 x10*6/uL Final     Hemoglobin   Date Value Ref Range Status  "  12/18/2024 13.6 12.0 - 16.0 g/dL Final   07/25/2024 13.5 12.0 - 16.0 g/dL Final   07/24/2024 13.9 12.0 - 16.0 g/dL Final     Hematocrit   Date Value Ref Range Status   12/18/2024 43.1 36.0 - 46.0 % Final   07/25/2024 41.9 36.0 - 46.0 % Final   07/24/2024 42.4 36.0 - 46.0 % Final     MCV   Date/Time Value Ref Range Status   12/18/2024 11:52  (H) 80 - 100 fL Final   07/25/2024 07:59  (H) 80 - 100 fL Final   07/24/2024 09:08  (H) 80 - 100 fL Final     MCH   Date/Time Value Ref Range Status   12/18/2024 11:52 AM 31.7 26.0 - 34.0 pg Final   07/25/2024 07:59 AM 33.8 26.0 - 34.0 pg Final   07/24/2024 09:08 AM 33.9 26.0 - 34.0 pg Final     MCHC   Date/Time Value Ref Range Status   12/18/2024 11:52 AM 31.6 (L) 32.0 - 36.0 g/dL Final   07/25/2024 07:59 AM 32.2 32.0 - 36.0 g/dL Final   07/24/2024 09:08 AM 32.8 32.0 - 36.0 g/dL Final     RDW   Date/Time Value Ref Range Status   12/18/2024 11:52 AM 15.5 (H) 11.5 - 14.5 % Final   07/25/2024 07:59 AM 15.1 (H) 11.5 - 14.5 % Final   07/24/2024 09:08 AM 14.6 (H) 11.5 - 14.5 % Final     Platelets   Date/Time Value Ref Range Status   12/18/2024 11:52  150 - 450 x10*3/uL Final   07/25/2024 07:59  150 - 450 x10*3/uL Final   07/24/2024 09:08  150 - 450 x10*3/uL Final     No results found for: \"MPV\"  Neutrophils %   Date/Time Value Ref Range Status   12/18/2024 11:52 AM 74.3 40.0 - 80.0 % Final   07/25/2024 07:59 AM 61.5 40.0 - 80.0 % Final   07/24/2024 09:08 AM 69.5 40.0 - 80.0 % Final     Immature Granulocytes %, Automated   Date/Time Value Ref Range Status   12/18/2024 11:52 AM 0.2 0.0 - 0.9 % Final     Comment:     Immature Granulocyte Count (IG) includes promyelocytes, myelocytes and metamyelocytes but does not include bands. Percent differential counts (%) should be interpreted in the context of the absolute cell counts (cells/UL).   07/25/2024 07:59 AM 0.7 0.0 - 0.9 % Final     Comment:     Immature Granulocyte Count (IG) includes promyelocytes, " myelocytes and metamyelocytes but does not include bands. Percent differential counts (%) should be interpreted in the context of the absolute cell counts (cells/UL).   07/24/2024 09:08 AM 0.7 0.0 - 0.9 % Final     Comment:     Immature Granulocyte Count (IG) includes promyelocytes, myelocytes and metamyelocytes but does not include bands. Percent differential counts (%) should be interpreted in the context of the absolute cell counts (cells/UL).     Lymphocytes %   Date/Time Value Ref Range Status   12/18/2024 11:52 AM 15.7 13.0 - 44.0 % Final   07/25/2024 07:59 AM 24.2 13.0 - 44.0 % Final   07/24/2024 09:08 AM 19.6 13.0 - 44.0 % Final     Monocytes %   Date/Time Value Ref Range Status   12/18/2024 11:52 AM 8.0 2.0 - 10.0 % Final   07/25/2024 07:59 AM 11.5 2.0 - 10.0 % Final   07/24/2024 09:08 AM 9.5 2.0 - 10.0 % Final     Eosinophils %   Date/Time Value Ref Range Status   12/18/2024 11:52 AM 1.1 0.0 - 6.0 % Final   07/25/2024 07:59 AM 1.3 0.0 - 6.0 % Final   07/24/2024 09:08 AM 0.2 0.0 - 6.0 % Final     Basophils %   Date/Time Value Ref Range Status   12/18/2024 11:52 AM 0.7 0.0 - 2.0 % Final   07/25/2024 07:59 AM 0.8 0.0 - 2.0 % Final   07/24/2024 09:08 AM 0.5 0.0 - 2.0 % Final     Neutrophils Absolute   Date/Time Value Ref Range Status   12/18/2024 11:52 AM 5.99 1.20 - 7.70 x10*3/uL Final     Comment:     Percent differential counts (%) should be interpreted in the context of the absolute cell counts (cells/uL).   07/25/2024 07:59 AM 4.36 1.20 - 7.70 x10*3/uL Final     Comment:     Percent differential counts (%) should be interpreted in the context of the absolute cell counts (cells/uL).   07/24/2024 09:08 AM 4.26 1.20 - 7.70 x10*3/uL Final     Comment:     Percent differential counts (%) should be interpreted in the context of the absolute cell counts (cells/uL).     Immature Granulocytes Absolute, Automated   Date/Time Value Ref Range Status   12/18/2024 11:52 AM 0.02 0.00 - 0.70 x10*3/uL Final   07/25/2024  "07:59 AM 0.05 0.00 - 0.70 x10*3/uL Final   07/24/2024 09:08 AM 0.04 0.00 - 0.70 x10*3/uL Final     Lymphocytes Absolute   Date/Time Value Ref Range Status   12/18/2024 11:52 AM 1.27 1.20 - 4.80 x10*3/uL Final   07/25/2024 07:59 AM 1.72 1.20 - 4.80 x10*3/uL Final   07/24/2024 09:08 AM 1.20 1.20 - 4.80 x10*3/uL Final     Monocytes Absolute   Date/Time Value Ref Range Status   12/18/2024 11:52 AM 0.65 0.10 - 1.00 x10*3/uL Final   07/25/2024 07:59 AM 0.82 0.10 - 1.00 x10*3/uL Final   07/24/2024 09:08 AM 0.58 0.10 - 1.00 x10*3/uL Final     Eosinophils Absolute   Date/Time Value Ref Range Status   12/18/2024 11:52 AM 0.09 0.00 - 0.70 x10*3/uL Final   07/25/2024 07:59 AM 0.09 0.00 - 0.70 x10*3/uL Final   07/24/2024 09:08 AM 0.01 0.00 - 0.70 x10*3/uL Final     Basophils Absolute   Date/Time Value Ref Range Status   12/18/2024 11:52 AM 0.06 0.00 - 0.10 x10*3/uL Final   07/25/2024 07:59 AM 0.06 0.00 - 0.10 x10*3/uL Final   07/24/2024 09:08 AM 0.03 0.00 - 0.10 x10*3/uL Final       No components found for: \"PT\"  aPTT   Date/Time Value Ref Range Status   07/19/2024 06:55 AM 31 27 - 38 seconds Final   07/01/2024 02:50 PM 29 27 - 38 seconds Final       Assessment/Plan    The patient is a 69-year-old woman with multiple medical problems as described above referred for further evaluation and management of abnormal blood count such as leukocytosis, elevated hemoglobin and hematocrit.  Physical examination revealed head tremors and smell of cigarette smoke.    Differential diagnosis of elevated WBC hemoglobin and hematocrit include primary causes such as myeloproliferative disease secondary causes such as infection, inflammation, drug-induced, cigarette smoking, chronic hypoxia versus others.  I have recommended hereditary hemochromatosis mutation studies, MPN studies, iron indicis, vitamin B12, folate, TSH levels and ultrasound of abdomen to evaluate further.  The patient understood appreciated all the details provided and was " grateful.    The patient had come for follow-up on March 17, 2025 regarding history of elevated hemoglobin hematocrit.  MPN studies and hemochromatosis studies were negative.  Ultrasound of abdomen revealed abnormal liver contour I have recommended GI evaluation.  The patient reeks of cigarette smoke I recommended strongly smoking cessation and GI referral for iron deficiency and abnormal ultrasound of abdomen.  Return in 6 months    Thank you for allowing me to participate in care of your patient if you have any questions please feel free to call me.     Problem List Items Addressed This Visit       Abnormal CBC            Kun Miranda MD

## 2025-03-27 ENCOUNTER — TELEPHONE (OUTPATIENT)
Dept: PRIMARY CARE | Facility: CLINIC | Age: 70
End: 2025-03-27
Payer: MEDICARE

## 2025-03-27 NOTE — TELEPHONE ENCOUNTER
Pt called and said her pharmacy sent her a refill of alendronate (Fosamax) 70 mg tablet     She was confused because she said she has not been taking this medication and would like to know if it is something she needs to be taking. Please advise.

## 2025-03-27 NOTE — TELEPHONE ENCOUNTER
Pt was on fosamax due to below:  Pt has osteoporosis of femur.  She has a 44% chance of a major fracture in the next 10yrs and a 20% chance of a hip fracture.    I do recommend she restart this. It is 1 a wk.

## 2025-03-28 ENCOUNTER — TELEPHONE (OUTPATIENT)
Dept: NEUROSURGERY | Facility: CLINIC | Age: 70
End: 2025-03-28
Payer: MEDICARE

## 2025-04-01 LAB
NON-UH HIE FREE T4: 1.1 NG/DL (ref 0.89–1.76)
NON-UH HIE HGB A1C: 6.2 %
NON-UH HIE TSH: 4.22 UIU/ML (ref 0.55–4.78)

## 2025-04-02 DIAGNOSIS — J45.20 MILD INTERMITTENT ASTHMA WITHOUT COMPLICATION (HHS-HCC): ICD-10-CM

## 2025-04-02 RX ORDER — ALBUTEROL SULFATE 90 UG/1
2 INHALANT RESPIRATORY (INHALATION) EVERY 6 HOURS PRN
Qty: 18 G | Refills: 1 | Status: SHIPPED | OUTPATIENT
Start: 2025-04-02 | End: 2026-04-02

## 2025-04-08 ENCOUNTER — APPOINTMENT (OUTPATIENT)
Dept: BEHAVIORAL HEALTH | Facility: CLINIC | Age: 70
End: 2025-04-08
Payer: MEDICARE

## 2025-04-08 VITALS
TEMPERATURE: 98.1 F | SYSTOLIC BLOOD PRESSURE: 169 MMHG | DIASTOLIC BLOOD PRESSURE: 72 MMHG | HEART RATE: 79 BPM | RESPIRATION RATE: 20 BRPM

## 2025-04-08 DIAGNOSIS — F51.01 PRIMARY INSOMNIA: ICD-10-CM

## 2025-04-08 DIAGNOSIS — F41.1 GAD (GENERALIZED ANXIETY DISORDER): ICD-10-CM

## 2025-04-08 PROCEDURE — 1159F MED LIST DOCD IN RCRD: CPT | Performed by: CLINICAL NURSE SPECIALIST

## 2025-04-08 PROCEDURE — 3077F SYST BP >= 140 MM HG: CPT | Performed by: CLINICAL NURSE SPECIALIST

## 2025-04-08 PROCEDURE — 1160F RVW MEDS BY RX/DR IN RCRD: CPT | Performed by: CLINICAL NURSE SPECIALIST

## 2025-04-08 PROCEDURE — 99213 OFFICE O/P EST LOW 20 MIN: CPT | Performed by: CLINICAL NURSE SPECIALIST

## 2025-04-08 PROCEDURE — 3078F DIAST BP <80 MM HG: CPT | Performed by: CLINICAL NURSE SPECIALIST

## 2025-04-08 RX ORDER — TRAZODONE HYDROCHLORIDE 50 MG/1
TABLET ORAL
Qty: 180 TABLET | Refills: 0 | Status: SHIPPED | OUTPATIENT
Start: 2025-04-08

## 2025-04-08 RX ORDER — ESCITALOPRAM OXALATE 20 MG/1
20 TABLET ORAL DAILY
Qty: 90 TABLET | Refills: 0 | Status: SHIPPED | OUTPATIENT
Start: 2025-04-08

## 2025-04-08 RX ORDER — BUSPIRONE HYDROCHLORIDE 10 MG/1
TABLET ORAL
Qty: 450 TABLET | Refills: 1 | Status: SHIPPED | OUTPATIENT
Start: 2025-04-08

## 2025-04-08 RX ORDER — CLONAZEPAM 1 MG/1
TABLET ORAL
Qty: 30 TABLET | Refills: 0 | Status: SHIPPED | OUTPATIENT
Start: 2025-04-08

## 2025-04-08 ASSESSMENT — ENCOUNTER SYMPTOMS
CONSTITUTIONAL NEGATIVE: 1
WHEEZING: 0
SHORTNESS OF BREATH: 0

## 2025-04-08 NOTE — PROGRESS NOTES
Subjective   Patient ID: Sweta Lim is a 69 y.o. female who presents for Follow-up.  Last physical:10/7/24    Is pt fasting? No   Is she taking folic acid daily? Yes   Is she taking vitamin d otc or rx? Neither   Is she taking iron? No   Did pt do 24hr urine?  No    If no does she still have the container? Yes   Does pt have a cpap? Yes   If yes using nightly? Sometimes- makes it difficult with her tremors   Last albuterol inh use? Yesterday       Using trelegy consistently?  Yes   Bps at home-  160/80  Sugars at home -fasting- does not do   Sugars at home- 2hrs pp- does not do   Last eye dr appt-  10/2/23; due  Last dentist appt- 1.5 months ago   B12-n/a  Cannot carmelita statin due to high liver enz; so she is not taking the simvastatin correct? Correct   Last podiatrist 3/2024  Does pt want to discuss quitting smoking? Is working on it herself, would like some tips   Any other questions or concerns today?   Pain in left armpit sore spot  Has questions about heme appt      monofilament out-shoes OFF-done    Appt with dr morrissey 5/2025  Saw dr griggs 4/2/25  [facility]    HPI  Last labs-4/2025 A1c 6.2, tsh nl  12/2024 vit d 29, b12 nl, iron low, folate nl  8/28/24 No hepatitis noted  Trigs and hdl normal.  Hdl just slightly low at 47. Goal >50 for women. Incr exercise.  8/21/2024 cbc -wbc high/h/h high, cmp-ca high/liver enz high/sugar high/kidney function nl  7/2024 A1c 6.6, tsh nl  5/2024 A1c 7.4, microalb abn  3/2024 tsh nl, t4 low  7/2023 A1c 5.9  6/2023 Sugar (aka glucose), kidney function, liver function and electrolytes in the CMP (comprehensive metabolic panel) were normal.  Hdl and trigs normal  Ldl high at 135. Goal <100. Decr fats (decrease portions of snacks and desserts) and incr fiber (increase veggies to at least 2 a day and at least 1 fruit a day).  CBC (complete blood count) was normal which looks at infection and anemia markers.  12/2023 A1c 6.5  7/2023 tsh nl, A1c 5.9  6/2023 Sugar (aka glucose), kidney  "function, liver function and electrolytes in the CMP (comprehensive metabolic panel) were normal.  Hdl and trigs normal  Ldl high at 135. Goal <100. Decr fats (decrease portions of snacks and desserts) and incr fiber (increase veggies to at least 2 a day and at least 1 fruit a day).  CBC (complete blood count) was normal which looks at infection and anemia markers.  Due for labs-     No results found for: \"CHOL\"  No results found for: \"TRIG\"  No results found for: \"HDL\"  No results found for: \"LDL\"  Thyroid Stimulating Hormone   Date Value Ref Range Status   12/18/2024 3.32 0.44 - 3.98 mIU/L Final     No results found for: \"A1C\"  No components found for: \"POCA1C\"  No results found for: \"ALBUR\"  No components found for: \"POCALBUR\"    Pain in left armpit sore spot-bigger last wk-red  No drainage    Has questions about heme appt     15 cigs a day  Wants tips to quit smoking    Brace on lf foot    Depression due to 8 deaths; no one to call because all dead  Has psych NP but no therapist    Exercise-want to walk; has a dog but he pulls  Diet-protein drink, not eat much  Sometimes chipotle  Not feel hunger  Not feel like preparing food    Immunization History   Administered Date(s) Administered    Flu vaccine (IIV4), preservative free *Check age/dose* 08/10/2018    Flu vaccine, quadrivalent, recombinant, preservative free, adult (FLUBLOK) 02/14/2020    Flu vaccine, trivalent, preservative free, HIGH-DOSE, age 65y+ (Fluzone) 10/07/2024    Influenza, Unspecified 10/04/2013    Influenza, seasonal, injectable 09/30/2009, 10/02/2020, 09/15/2023    Moderna SARS-CoV-2 Vaccination 04/07/2021, 05/05/2021, 01/08/2022    Pneumococcal polysaccharide vaccine, 23-valent, age 2 years and older (PNEUMOVAX 23) 10/02/2020, 10/02/2022    Td vaccine, age 7 years and older (TDVAX) 11/10/2020    Tdap vaccine, age 7 year and older (BOOSTRIX, ADACEL) 01/21/2013, 01/24/2013        Patient Care Team     Relationship Specialty Notifications Start " "End   Tiara Deal MD Referring Physician Endocrinology  8/27/24     Address:  [address] [place] [id number]        Review of Systems   Constitutional: Negative.    Respiratory:  Negative for shortness of breath and wheezing.    Cardiovascular:  Negative for chest pain.   Skin:         Boil lf armpit       Objective   Visit Vitals  /80   Pulse 77   Temp 36.2 °C (97.1 °F)      BP Readings from Last 3 Encounters:   04/09/25 147/80   04/08/25 169/72   03/17/25 150/77     Wt Readings from Last 3 Encounters:   04/09/25 78.8 kg (173 lb 12.8 oz)   03/17/25 78.1 kg (172 lb 2.9 oz)   02/12/25 77.7 kg (171 lb 3.2 oz)       The ASCVD Risk score (Nam BONILLA, et al., 2019) failed to calculate for the following reasons:    Cannot find a previous HDL lab    Cannot find a previous total cholesterol lab     Physical Exam  Constitutional:       General: She is not in acute distress.     Appearance: Normal appearance.   Musculoskeletal:        Feet:    Feet:      Right foot:      Protective Sensation: 5 sites tested.   1 site sensed.     Skin integrity: Skin integrity normal.      Left foot:      Protective Sensation: 5 sites tested.   1 site sensed.     Skin integrity: Skin integrity normal.   Skin:     Comments: Boil 1.5\" lf armpit. Red. Tender to touch. No open area or drainage. No rash.   Neurological:      Mental Status: She is alert.       Assessment/Plan   Diagnoses and all orders for this visit:  Benign essential hypertension  -     Comprehensive Metabolic Panel; Future  Microalbuminuria  Mixed hyperlipidemia  -     Comprehensive Metabolic Panel; Future  -     Lipid Panel; Future  Type 2 diabetes mellitus without complication, with long-term current use of insulin  -     Comprehensive Metabolic Panel; Future  Vitamin D deficiency  -     Vitamin D 25-Hydroxy,Total (for eval of Vitamin D levels); Future  Anemia, unspecified type  -     Iron and TIBC; Future  Boil  -     Referral to Dermatology  -   "   doxycycline (Vibramycin) 100 mg capsule; Take 1 capsule (100 mg) by mouth 2 times a day for 10 days. Take with at least 8 ounces (large glass) of water, do not lie down for 30 minutes after  Moderate episode of recurrent major depressive disorder  -     Referral to Psychology; Future  Encounter to establish care  -     Referral to Obstetrics / Gynecology; Future  Other orders  -     Follow Up In Primary Care - Established  -     Follow Up In Primary Care - Medicare Annual; Future      See patient instructions for full plan
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
## 2025-04-08 NOTE — PATIENT INSTRUCTIONS
Meals on wheels  The program serving the [facility] is for homebound seniors or adults with disabilities along with those recovering from illness who are alone and cannot manage food shopping and meal preparation, but can otherwise maintain themselves in their own home. Meals are delivered Monday - Friday between the hours of 10:30am and noon. Two meals are provided each day for $6.50 and include one hot and one cold meal plus beverage OR one hot meal for $5.50.  Call Danica Bolanos at [phone] ext. [phone] or [email or web address]    Keep dr yuni anderson dr appt in may  Set up podiatrist appt  See new ob gyn  See therapist    See jeramie kitchen  antibiotic    Labs when fasting    Do 24hr urine    Decr cigarettes by 1 each week; the next 7d smoke 14 a day    Return in oct for wellness appt      I will communicate with you via [employer] regarding messages and results. If you need help with this, you can call the support line at [phone].    IT WAS A PLEASURE TO SEE YOU TODAY. THANK YOU FOR CHOOSING US FOR YOUR HEALTHCARE NEEDS.

## 2025-04-09 ENCOUNTER — APPOINTMENT (OUTPATIENT)
Dept: PRIMARY CARE | Facility: CLINIC | Age: 70
End: 2025-04-09
Payer: MEDICARE

## 2025-04-09 VITALS
SYSTOLIC BLOOD PRESSURE: 144 MMHG | TEMPERATURE: 97.1 F | DIASTOLIC BLOOD PRESSURE: 78 MMHG | OXYGEN SATURATION: 91 % | BODY MASS INDEX: 27.28 KG/M2 | WEIGHT: 173.8 LBS | HEART RATE: 77 BPM | HEIGHT: 67 IN

## 2025-04-09 DIAGNOSIS — L02.92 BOIL: ICD-10-CM

## 2025-04-09 DIAGNOSIS — E78.2 MIXED HYPERLIPIDEMIA: ICD-10-CM

## 2025-04-09 DIAGNOSIS — M06.9 RHEUMATOID ARTHRITIS, INVOLVING UNSPECIFIED SITE, UNSPECIFIED WHETHER RHEUMATOID FACTOR PRESENT (MULTI): ICD-10-CM

## 2025-04-09 DIAGNOSIS — E11.42 DIABETIC POLYNEUROPATHY ASSOCIATED WITH TYPE 2 DIABETES MELLITUS: ICD-10-CM

## 2025-04-09 DIAGNOSIS — D64.9 ANEMIA, UNSPECIFIED TYPE: ICD-10-CM

## 2025-04-09 DIAGNOSIS — F33.1 MODERATE EPISODE OF RECURRENT MAJOR DEPRESSIVE DISORDER: ICD-10-CM

## 2025-04-09 DIAGNOSIS — I10 BENIGN ESSENTIAL HYPERTENSION: Primary | ICD-10-CM

## 2025-04-09 DIAGNOSIS — Z76.89 ENCOUNTER TO ESTABLISH CARE: ICD-10-CM

## 2025-04-09 DIAGNOSIS — J44.89 OTHER SPECIFIED CHRONIC OBSTRUCTIVE PULMONARY DISEASE: ICD-10-CM

## 2025-04-09 DIAGNOSIS — R80.9 MICROALBUMINURIA: ICD-10-CM

## 2025-04-09 DIAGNOSIS — E55.9 VITAMIN D DEFICIENCY: ICD-10-CM

## 2025-04-09 DIAGNOSIS — E11.9 TYPE 2 DIABETES MELLITUS WITHOUT COMPLICATION, WITH LONG-TERM CURRENT USE OF INSULIN: ICD-10-CM

## 2025-04-09 DIAGNOSIS — Z79.4 TYPE 2 DIABETES MELLITUS WITHOUT COMPLICATION, WITH LONG-TERM CURRENT USE OF INSULIN: ICD-10-CM

## 2025-04-09 PROCEDURE — 3077F SYST BP >= 140 MM HG: CPT | Performed by: NURSE PRACTITIONER

## 2025-04-09 PROCEDURE — 3008F BODY MASS INDEX DOCD: CPT | Performed by: NURSE PRACTITIONER

## 2025-04-09 PROCEDURE — 1159F MED LIST DOCD IN RCRD: CPT | Performed by: NURSE PRACTITIONER

## 2025-04-09 PROCEDURE — 3078F DIAST BP <80 MM HG: CPT | Performed by: NURSE PRACTITIONER

## 2025-04-09 PROCEDURE — 99214 OFFICE O/P EST MOD 30 MIN: CPT | Performed by: NURSE PRACTITIONER

## 2025-04-09 RX ORDER — DOXYCYCLINE 100 MG/1
100 CAPSULE ORAL 2 TIMES DAILY
Qty: 20 CAPSULE | Refills: 0 | Status: SHIPPED | OUTPATIENT
Start: 2025-04-09 | End: 2025-04-19

## 2025-04-09 ASSESSMENT — PATIENT HEALTH QUESTIONNAIRE - PHQ9
2. FEELING DOWN, DEPRESSED OR HOPELESS: MORE THAN HALF THE DAYS
SUM OF ALL RESPONSES TO PHQ9 QUESTIONS 1 AND 2: 4
1. LITTLE INTEREST OR PLEASURE IN DOING THINGS: MORE THAN HALF THE DAYS
10. IF YOU CHECKED OFF ANY PROBLEMS, HOW DIFFICULT HAVE THESE PROBLEMS MADE IT FOR YOU TO DO YOUR WORK, TAKE CARE OF THINGS AT HOME, OR GET ALONG WITH OTHER PEOPLE: NOT DIFFICULT AT ALL

## 2025-04-11 ENCOUNTER — TELEPHONE (OUTPATIENT)
Dept: PRIMARY CARE | Facility: CLINIC | Age: 70
End: 2025-04-11
Payer: MEDICARE

## 2025-04-11 NOTE — TELEPHONE ENCOUNTER
I received office notes from dr barajas and her last eye dr dickey was 10/2023; pls call pt to let her know that she should make a follow up appt

## 2025-04-14 ENCOUNTER — TELEPHONE (OUTPATIENT)
Dept: PRIMARY CARE | Facility: CLINIC | Age: 70
End: 2025-04-14
Payer: MEDICARE

## 2025-04-14 DIAGNOSIS — L02.92 BOIL: Primary | ICD-10-CM

## 2025-04-14 NOTE — TELEPHONE ENCOUNTER
Sweta said the doxy set off her internal tremor and she can not stop shaking and sweating.  Please advise

## 2025-04-15 RX ORDER — CLINDAMYCIN HYDROCHLORIDE 300 MG/1
300 CAPSULE ORAL 3 TIMES DAILY
Qty: 15 CAPSULE | Refills: 0 | Status: SHIPPED | OUTPATIENT
Start: 2025-04-15 | End: 2025-04-20

## 2025-04-20 ENCOUNTER — TELEPHONE (OUTPATIENT)
Dept: PRIMARY CARE | Facility: CLINIC | Age: 70
End: 2025-04-20
Payer: MEDICARE

## 2025-04-20 DIAGNOSIS — R80.9 MICROALBUMINURIA: Primary | ICD-10-CM

## 2025-04-20 NOTE — TELEPHONE ENCOUNTER
Please call pt.  Pt has not viewed the [employer] message below:     Good morning!  This is a reminder to do the 24 hour urine sample.  To do the test:  Urinate in the toilet the first urine of the day. The second urine of the day to the first urine of the next day goes into the large container. Keep it refrigerated while you are doing the test and until you bring it to the lab.

## 2025-04-23 ENCOUNTER — TELEPHONE (OUTPATIENT)
Dept: NEUROSURGERY | Facility: CLINIC | Age: 70
End: 2025-04-23
Payer: MEDICARE

## 2025-04-23 DIAGNOSIS — M54.2 NECK PAIN, CHRONIC: Primary | ICD-10-CM

## 2025-04-23 DIAGNOSIS — G89.29 NECK PAIN, CHRONIC: Primary | ICD-10-CM

## 2025-04-23 RX ORDER — METHOCARBAMOL 500 MG/1
500 TABLET, FILM COATED ORAL 3 TIMES DAILY PRN
Qty: 60 TABLET | Refills: 0 | Status: SHIPPED | OUTPATIENT
Start: 2025-04-23 | End: 2025-05-23

## 2025-04-23 NOTE — PROGRESS NOTES
Prescription for Robaxin provided as well as a referral to Physical Therapy.       Mary Francois APRMARGARET-[facility]  [address]  [address]  [place] [address]    [address] [place] [address]  [address] [place] [id number]     Phone: [phone]  Fax: [phone]

## 2025-05-09 ENCOUNTER — APPOINTMENT (OUTPATIENT)
Dept: GASTROENTEROLOGY | Facility: CLINIC | Age: 70
End: 2025-05-09
Payer: MEDICARE

## 2025-05-09 ENCOUNTER — TELEPHONE (OUTPATIENT)
Dept: BEHAVIORAL HEALTH | Facility: CLINIC | Age: 70
End: 2025-05-09

## 2025-05-09 VITALS
BODY MASS INDEX: 27.94 KG/M2 | DIASTOLIC BLOOD PRESSURE: 67 MMHG | SYSTOLIC BLOOD PRESSURE: 116 MMHG | HEIGHT: 67 IN | WEIGHT: 178 LBS | HEART RATE: 58 BPM

## 2025-05-09 DIAGNOSIS — R79.89 ABNORMAL CBC: ICD-10-CM

## 2025-05-09 DIAGNOSIS — K64.9 HEMORRHOIDS, UNSPECIFIED HEMORRHOID TYPE: ICD-10-CM

## 2025-05-09 DIAGNOSIS — K57.90 DIVERTICULOSIS: ICD-10-CM

## 2025-05-09 DIAGNOSIS — E61.1 IRON DEFICIENCY: ICD-10-CM

## 2025-05-09 DIAGNOSIS — R14.0 ABDOMINAL BLOATING: ICD-10-CM

## 2025-05-09 DIAGNOSIS — R12 HEARTBURN: ICD-10-CM

## 2025-05-09 DIAGNOSIS — R93.5 ABNORMAL US (ULTRASOUND) OF ABDOMEN: ICD-10-CM

## 2025-05-09 DIAGNOSIS — F10.99 ALCOHOL-RELATED DISORDER: ICD-10-CM

## 2025-05-09 DIAGNOSIS — F17.210 NICOTINE DEPENDENCE, CIGARETTES, UNCOMPLICATED: Primary | ICD-10-CM

## 2025-05-09 DIAGNOSIS — D12.6 COLON ADENOMA: ICD-10-CM

## 2025-05-09 DIAGNOSIS — K58.1 IRRITABLE BOWEL SYNDROME WITH CONSTIPATION: ICD-10-CM

## 2025-05-09 DIAGNOSIS — Z11.59 ENCOUNTER FOR SCREENING FOR OTHER VIRAL DISEASES: ICD-10-CM

## 2025-05-09 DIAGNOSIS — R13.10 DYSPHAGIA, UNSPECIFIED TYPE: ICD-10-CM

## 2025-05-09 DIAGNOSIS — K76.0 FATTY LIVER: ICD-10-CM

## 2025-05-09 DIAGNOSIS — R94.5 ABNORMAL RESULTS OF LIVER FUNCTION STUDIES: ICD-10-CM

## 2025-05-09 PROBLEM — F10.10 ALCOHOL ABUSE: Status: ACTIVE | Noted: 2025-05-09

## 2025-05-09 PROCEDURE — 3074F SYST BP LT 130 MM HG: CPT | Performed by: STUDENT IN AN ORGANIZED HEALTH CARE EDUCATION/TRAINING PROGRAM

## 2025-05-09 PROCEDURE — 3078F DIAST BP <80 MM HG: CPT | Performed by: STUDENT IN AN ORGANIZED HEALTH CARE EDUCATION/TRAINING PROGRAM

## 2025-05-09 PROCEDURE — 99406 BEHAV CHNG SMOKING 3-10 MIN: CPT | Performed by: STUDENT IN AN ORGANIZED HEALTH CARE EDUCATION/TRAINING PROGRAM

## 2025-05-09 PROCEDURE — 1160F RVW MEDS BY RX/DR IN RCRD: CPT | Performed by: STUDENT IN AN ORGANIZED HEALTH CARE EDUCATION/TRAINING PROGRAM

## 2025-05-09 PROCEDURE — 99215 OFFICE O/P EST HI 40 MIN: CPT | Performed by: STUDENT IN AN ORGANIZED HEALTH CARE EDUCATION/TRAINING PROGRAM

## 2025-05-09 PROCEDURE — 3008F BODY MASS INDEX DOCD: CPT | Performed by: STUDENT IN AN ORGANIZED HEALTH CARE EDUCATION/TRAINING PROGRAM

## 2025-05-09 PROCEDURE — 1159F MED LIST DOCD IN RCRD: CPT | Performed by: STUDENT IN AN ORGANIZED HEALTH CARE EDUCATION/TRAINING PROGRAM

## 2025-05-09 RX ORDER — SENNOSIDES 8.6 MG/1
8.6 CAPSULE, GELATIN COATED ORAL NIGHTLY
Qty: 30 CAPSULE | Refills: 11 | Status: SHIPPED | OUTPATIENT
Start: 2025-05-09 | End: 2026-05-04

## 2025-05-09 NOTE — PROGRESS NOTES
11/2021  66-year-old lady with history of exheavy alcohol use, hemorrhoidectomy asthma anxiety diabetes hypertension dyslipidemia sleep apnea submandibular sialoadenitis, thoracic spondylosis status post rhizotomy presenting for multiple GI complaints. Patient mentions a 30-year history of worsening solid and liquid dysphagia, started initially with solid food dysphagia, previous heartburn, with feeling of food stuck in her upper esophagus with intermittent choking and feeling of diet going to the wrong pipe. She also complains of chronic steatorrhea and loose stools with having 1-2 bowel movements per day. She denies any fever chills nausea vomiting melena hematochezia hematemesis weight loss.    8/4/2023  Patient is here for follow-up, did not have her prior EGD and colonoscopy because she was busy with her mom was sick and passed away in December 2022, she complains of worsening solid food dysphagia in the middle of her chest, daily nausea, admits to smoking and using marijuana last 66 pounds since 2021 mentions could be because she was under a lot of stress taking care of her mother    10/16/2023  Patient is here for follow-up, feeling well, felt better after colonoscopy but is currently feeling bloated again, heartburn is better, complains of intermittent dysphagia in the upper neck area, still smoking    5/9/2025  Patient is here for follow-up, had a cervical fusion in 7/2024 complicated by neck hematoma, mentions solid and liquid dysphagia in the neck area, so speech therapy and 7/2024 MBSS showed severe oropharyngeal dysphagia recommended follow-up as outpatient, having 1 bowel movement per day, still smoking    EGD and dilation 15 years ago as per patient  EGD 8/2023 small hiatal hernia, grade a esophagitis, gastritis, biopsy of the esophagus stomach and duodenum unremarkable  Colonoscopy many years ago unknown result as per her  Colonoscopy 8/2023 [facility] 8, left diverticulosis, hemorrhoids, sigmoid  transverse and hepatic flexure TA, TA with 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, recommend repeat in 6 months after 1 week of twice daily MiraLAX, CF scope  Colonoscopy 12/2023 hepatic flexure TA, repeat in 1 year  Family history reviewed, not pertinent to chief complaint  Normally has 1-2 bowel movements per day, soft, back-to-back, currently as above  Denies NSAIDs, ex heavy alcohol use for 40y, denies IV drug use , Positive marijuana use smoking               The note was created using voice recognition transcription software. Despite proofreading, unintentional typographical errors may be present. Please contact the GI office with any questions or concerns.     Current Medications: reviewed    A 10 point review of system is negative except for what is mentioned in the HPI      Follow up with primary provider was advised for non GI symptoms and findings    Follow up with GI was advised     Vital Signs: Reviewed    Physical Exam:  General: no apparent distress, pleasant and cooperative  Skin:  Warm and dry, no jaundice  HEENT: No scleral icterus, no conjunctival pallor, normocephalic, atraumatic, mucous membranes moist  Neck:  atraumatic, trachea midline, no JVD  Chest:  decreased air entry to auscultation bilaterally. No wheezes, rales, or rhonchi  CV:  Regular rate and rhythm.  Positive S1/S2  Abdomen: no distension, +BS, soft, non-tender to palpation, no rebound tenderness, no guarding, no rigidity, no discernible ascites   Extremities: lower extremity edema, Chronic pigmentary changes, no cyanosis  Neurological:  A&Ox3 , no asterixis  Psychiatric: cooperative     Investigations:  Labs, radiological imaging and cardiac work up were reviewed      1-will screen for hepatitis C    2-BMI 27 up from 25, stable over the last 6 months, mentions she lost a lot of weight because she was taking care of her mom and had a lot of anxiety, currently stable and eating better    3- Healthcare maintenance,  will repeat after swallow evaluation to make sure that the patient is able to safely drink her prep, if so we will schedule colonoscopy after 1 week of twice daily MiraLAX in addition to prep, adult  scope    4- chronic solid dysphagia worsened into solid and liquid dysphagia in the neck area after cervical fusion 9/7/2024 complicated by neck hematoma, MBSS 7/2024 showing severe oropharyngeal dysphagia, speech therapy recommended outpatient follow-up, will refer for follow-up, prior history of GERD, EGD as above, continue with omeprazole daily, lifestyle changes advised    5-marijuana use, smoking, advised to avoid substance overuse, 4 minutes    6-prior alcohol abuse, LFTs unremarkable, check ultrasound abdomen     7-bloating and abdominal distention and incomplete bowel emptying, felt better after colonoscopy prep, start senna 1 tablet once a day, Gas-X as needed    8-anemia 11/2024, iron saturation 5%, TIBC 549, ferritin 20, unremarkable B9 B12 TSH, EGD and colonoscopy as above, note hematoma following cervical fusion 7/2024, hemoglobin currently normal, will follow    9- Marijuana use, smoking, cessation advised, 4 minutes    10-fatty liver on ultrasound 12/2024 rule out cirrhosis, prior alcohol abuse for many years, FIB-4 Calculation: 0.79 at 12/18/2024, lifestyle changes advised, avoid alcohol use

## 2025-05-09 NOTE — PATIENT INSTRUCTIONS
1-we will send you to the speech and swallow therapy because of your trouble swallowing  2-for the bloating and gas start taking senna 1 tablet at night to help you empty better when you go to the bathroom, you can take Gas-X over-the-counter 3 or 4 times a day as needed to help with the gas  3-once to make sure that your swallowing is better we can schedule for a colonoscopy

## 2025-05-12 ENCOUNTER — TELEPHONE (OUTPATIENT)
Dept: BEHAVIORAL HEALTH | Facility: CLINIC | Age: 70
End: 2025-05-12
Payer: MEDICARE

## 2025-05-12 ENCOUNTER — DOCUMENTATION (OUTPATIENT)
Dept: BEHAVIORAL HEALTH | Facility: CLINIC | Age: 70
End: 2025-05-12
Payer: MEDICARE

## 2025-05-12 DIAGNOSIS — F41.1 GAD (GENERALIZED ANXIETY DISORDER): ICD-10-CM

## 2025-05-12 RX ORDER — CLONAZEPAM 1 MG/1
TABLET ORAL
Qty: 30 TABLET | Refills: 0 | Status: SHIPPED | OUTPATIENT
Start: 2025-05-12

## 2025-05-12 NOTE — PROGRESS NOTES
Request for clonazepam. Patient notified that this was sent in today during her appointment to [facility] #[id number].

## 2025-05-12 NOTE — PROGRESS NOTES
Nonbillable note: reviewed refill request in [facility] emr , reviewed med order history in [facility] emr and also reviewed oarrs, no concerns on oarrs , sent script to pharmacy for clonazepam , same dose , she is scheduled for an appointment in June . [facility] cns

## 2025-05-18 DIAGNOSIS — J45.20 MILD INTERMITTENT ASTHMA WITHOUT COMPLICATION (HHS-HCC): ICD-10-CM

## 2025-05-19 RX ORDER — ALBUTEROL SULFATE 90 UG/1
2 INHALANT RESPIRATORY (INHALATION) EVERY 6 HOURS PRN
Qty: 18 G | Refills: 1 | Status: SHIPPED | OUTPATIENT
Start: 2025-05-19 | End: 2026-05-19

## 2025-05-22 ENCOUNTER — TELEPHONE (OUTPATIENT)
Dept: GASTROENTEROLOGY | Facility: CLINIC | Age: 70
End: 2025-05-22
Payer: MEDICARE

## 2025-05-22 DIAGNOSIS — R13.10 DYSPHAGIA: ICD-10-CM

## 2025-05-22 RX ORDER — OMEPRAZOLE 40 MG/1
40 CAPSULE, DELAYED RELEASE ORAL 2 TIMES DAILY
Qty: 180 CAPSULE | Refills: 2 | Status: SHIPPED | OUTPATIENT
Start: 2025-05-22

## 2025-05-22 NOTE — TELEPHONE ENCOUNTER
Pt was just in on 5/9/25 and forgot to get refill of omeprazole she has one pill left, pls send fill if appropriate, she also is wondering what Dr. Sullivan will do for the Abnormal US of Abdomen/ Iron Deficency she was referred to us for by Dr. Miranda

## 2025-05-28 ENCOUNTER — APPOINTMENT (OUTPATIENT)
Dept: PHYSICAL THERAPY | Facility: CLINIC | Age: 70
End: 2025-05-28
Payer: MEDICARE

## 2025-06-04 ENCOUNTER — APPOINTMENT (OUTPATIENT)
Dept: NEUROLOGY | Facility: CLINIC | Age: 70
End: 2025-06-04
Payer: MEDICARE

## 2025-06-05 ENCOUNTER — APPOINTMENT (OUTPATIENT)
Dept: PHYSICAL THERAPY | Facility: CLINIC | Age: 70
End: 2025-06-05
Payer: MEDICARE

## 2025-06-05 NOTE — PROGRESS NOTES
"Physical Therapy Evaluation    Patient Name: Sweta Lim  MRN: [id number]  Today's Date: 6/5/2025        Problem List Items Addressed This Visit    None      Insurance:  Visit number #1  Insurance Type: Payor: [employer]ARE / Plan: [employer] MEDICARE / Product Type: *No Product type* /   Authorization or Plan of Care date Range:     General:  Reason for visit: neck pain   Referred by: Mary Francois APRN-*   Next MD appt: nothing scheduled    Surgery: Exploration and revision of neck hematoma - Bilateral  Date of Last Surgery: 7/19/2024   Post-Op Days: 321    Precautions:  none  Fall Risk: Low     Assessment:   Sweta Lim  is a 69 y.o. old patient who participated in a physical therapy evaluation today due to neck pain. Sweta presents with signs and symptoms consistent with ***. Sweta's impairments include: {talimpair:12011}.  Due to these impairments, she has the following functional limitations and participation restrictions: {talfl:36934}. Sweta's clinical presentation is {talClinicalPresentation:34123::\"Stable and/or uncomplicated characteristics\"} with the level of complexity being {talcomplexity:68495::\"low\"}. Sweta's potential for rehab is {talprognosis:24730::\"good\"}. Skilled physical therapy services are appropriate and beneficial in order to achieve measurable and meaningful change in the objective tests and measures. Utilization of skilled physical therapy services will aid in advancing her functional status and attaining her therapy-related goals. Sweta verbalized understanding and is in agreement with all goals and plan of care.  Sweta left session with all questions answered and no increase in symptoms.      Plan:  1x per week for 4-6 weeks    Recommended Treatment:  Therapeutic exercise, Manual therapy, Home program instruction and progression, Neuromuscular re-education, Therapeutic activities, Self care and home management, Instruction in activity modification, " "Electrical stimulation, and Cryotherapy    Goals:  Patient to be Indep with Home Exercise Program for symptom management.    NDI: 0-19% impairment to indicate a significant improvement in overall function.      Improve DNF endurance test to 10 seconds to allow for correct cervical mechanics.    Patient will demonstrate correct posture with min to no cueing to allow for correct loading strategy.    Patient will demo mild to no limitation AROM of the cervical spine to allow for correct mechanics with functional mobility.      Patient will report driving without pain to allow for return to work and ADLs without limitation.     Patient will report sitting/reading >30 min without pain to allow for return to work and ADLs without limitation.     Subjective:  CC: Patient arrives with complaint of ***  DOI: ***  DOS: ***  KATI: ***  IMAGING: ***  PAIN: CURRENT: ({0-10:98068}/10); BEST: ({0-10:44555}/10); WORST: ({0-10:20428}/10); LOCATION: (***); Description: ***  ALLEVIATES PAIN: ***  EXACERBATES PAIN: ***  CURRENT MEDICAL MANAGEMENT: ***  PLOF: ***  FUNCTIONAL LIMITATIONS: {PT functional limitations:43186}  LIVING ENVIRONMENT: ***  WORK: ***  EXERCISE: ***  PATIENT'S GOAL: ***    Medical History Form: Reviewed (scanned into chart)    Objective:   POSTURE:   {Posture assessment positive for:83940}    DERMATOMES UE:   {Dermatomes UE:45419::\"Patient denies altered sensation in the *** extremities.\"}    PALPATION: ***    NECK AROM MOVEMENT LOSS:  Protrusion: {Major Moderate Minimal Nil:65042::\"Nil\"}  Flexion: {Major Moderate Minimal Nil:91014::\"Nil\"}  Retraction: {Major Moderate Minimal Nil:72825::\"Nil\"}  Extension: {Major Moderate Minimal Nil:01279::\"Nil\"}  Sidebending(R): {Major Moderate Minimal Nil:82393::\"Nil\"}  Sidebending (L): {Major Moderate Minimal Nil:79583::\"Nil\"}  Rotation (R): {Major Moderate Minimal Nil:00830::\"Nil\"}  Rotation (L): {Major Moderate Minimal Nil:83728::\"Nil\"}    NECK PROM MOVEMENT LOSS: " "  Sidebending(R): {Major Moderate Minimal Nil:94361::\"Nil\"}  Sidebending (L): {Major Moderate Minimal Nil:37704::\"Nil\"}  Rotation (R): {Major Moderate Minimal Nil:20296::\"Nil\"}  Rotation (L): {Major Moderate Minimal Nil:61952::\"Nil\"}  Flexion: {Major Moderate Minimal Nil:32620::\"Nil\"}    UPPER EXTREMITY MUSCLE STRENGTH:  {Myotomes UE:21010::\"Upper Extremity Myotomes are WNL bilaterally\"}  {MMT:12480}    NECK REPEATED MOTIONS:    PRO: {Laura:99216}  Rep PRO: {Laura:82504}  RET: {Laura:79469}  Rep RET: {Laura:53900}  RET EXT: {Laura:19313}  Rep RET EXT: {Laura:93500}    Flexibility:   {FLEXIBILITY NECK and UE:79072}    Additional Special Testing:  {Orthopedic Special Tests:87649}       Outcome Measures:        Treatment Performed: (\"NP\" = Not Performed)     Therapeutic Exercise:    minutes      Manual Therapy:    minutes      Neuromuscular Re-education:  minutes      Gait Training:    minutes      Therapeutic Activity:  minutes      Modalities:  Vasopneumatic Device  minutes  Electrical Stimulation  minutes  Ultrasound  minutes  Iontophoresis  minutes  Cold Pack  minutes    Evaluation Complexity: Low:   minutes; Moderate   minutes; Complex   minutes    Re-Evaluation:   minutes    "

## 2025-06-09 ENCOUNTER — APPOINTMENT (OUTPATIENT)
Dept: BEHAVIORAL HEALTH | Facility: CLINIC | Age: 70
End: 2025-06-09
Payer: MEDICARE

## 2025-06-10 ENCOUNTER — APPOINTMENT (OUTPATIENT)
Dept: BEHAVIORAL HEALTH | Facility: CLINIC | Age: 70
End: 2025-06-10
Payer: MEDICARE

## 2025-06-16 ENCOUNTER — DOCUMENTATION (OUTPATIENT)
Dept: BEHAVIORAL HEALTH | Facility: CLINIC | Age: 70
End: 2025-06-16
Payer: MEDICARE

## 2025-06-16 DIAGNOSIS — F41.1 GENERALIZED ANXIETY DISORDER: ICD-10-CM

## 2025-06-16 DIAGNOSIS — F41.1 GAD (GENERALIZED ANXIETY DISORDER): ICD-10-CM

## 2025-06-16 RX ORDER — CLONAZEPAM 1 MG/1
TABLET ORAL
Qty: 30 TABLET | Refills: 0 | Status: SHIPPED | OUTPATIENT
Start: 2025-06-16

## 2025-06-16 NOTE — PROGRESS NOTES
Non billable note :Will order uds at the next appointment , oarrs ran , no concerns on oarrs. Patient requested script for clonazepam , sent script to pharmacy for 30 days after reviewed med order history in [facility] em[facility]

## 2025-06-17 ENCOUNTER — DOCUMENTATION (OUTPATIENT)
Dept: BEHAVIORAL HEALTH | Facility: CLINIC | Age: 70
End: 2025-06-17
Payer: MEDICARE

## 2025-06-17 DIAGNOSIS — F41.1 GAD (GENERALIZED ANXIETY DISORDER): ICD-10-CM

## 2025-06-18 ENCOUNTER — APPOINTMENT (OUTPATIENT)
Dept: SPEECH THERAPY | Facility: CLINIC | Age: 70
End: 2025-06-18
Payer: MEDICARE

## 2025-06-18 NOTE — PROGRESS NOTES
Nonbillable note : left patient a voicemail that clonazepam was sent over yesterday to her pharmacy , ran oarrs today , she has not yet picked the script up per oarrs [facility]

## 2025-06-25 ENCOUNTER — DOCUMENTATION (OUTPATIENT)
Dept: SPEECH THERAPY | Facility: CLINIC | Age: 70
End: 2025-06-25
Payer: MEDICARE

## 2025-06-25 NOTE — PROGRESS NOTES
Speech-Language Pathology                 Therapy Communication Note    Patient Name: Sweta Lim  MRN: [id number]  Department:   Outpatient Speech Therapy  Today's Date: 6/25/2025     Discipline: Speech Language Pathology    Missed Visit:   Pt was a no show for initial speech therapy evaluation. Staff to reach out to reschedule as able.

## 2025-06-30 LAB — NON-UH HIE HGB A1C: 6.2 %

## 2025-07-03 ENCOUNTER — DOCUMENTATION (OUTPATIENT)
Dept: SPEECH THERAPY | Facility: CLINIC | Age: 70
End: 2025-07-03
Payer: MEDICARE

## 2025-07-03 NOTE — PROGRESS NOTES
Speech-Language Pathology                 Therapy Communication Note    Patient Name: Sweta Lim  MRN: [id number]  Department:   Outpatient Speech Therapy  Today's Date: 7/3/2025     Discipline: Speech Language Pathology    Missed Visit:   Pt did not show for initial speech therapy evaluation.  Pt to call into office to reschedule as able at [phone].

## 2025-07-07 ENCOUNTER — TELEPHONE (OUTPATIENT)
Dept: PRIMARY CARE | Facility: CLINIC | Age: 70
End: 2025-07-07
Payer: MEDICARE

## 2025-07-07 DIAGNOSIS — E11.42 DIABETIC POLYNEUROPATHY ASSOCIATED WITH TYPE 2 DIABETES MELLITUS: ICD-10-CM

## 2025-07-07 DIAGNOSIS — M54.50 CHRONIC LOW BACK PAIN, UNSPECIFIED BACK PAIN LATERALITY, UNSPECIFIED WHETHER SCIATICA PRESENT: Primary | ICD-10-CM

## 2025-07-07 DIAGNOSIS — G89.29 CHRONIC LOW BACK PAIN, UNSPECIFIED BACK PAIN LATERALITY, UNSPECIFIED WHETHER SCIATICA PRESENT: Primary | ICD-10-CM

## 2025-07-07 NOTE — TELEPHONE ENCOUNTER
Copied from CRM #[id number]. Topic: Information Request - Trying to reach PCP  >> 2025  1:30 PM Zeenat ZAPIEN wrote:  Good afternoon. This pt of CNP Leah Amatosadi called requesting a prescription for a placard for Wheelchair access for her vehicle. She said her placard is going to  on her birthday on [birth year] so she is requesting the prescription before then. She reached out to the office of KALYANI Galarza and Dr. Nick Crump and was told to reach out to her primary care provider. Please assist. Thank you!

## 2025-07-07 NOTE — TELEPHONE ENCOUNTER
Pt got a Disability Placard from when she had surgery done, but was not sure if they can renew it for her. She said it is going to  soon and would like to know if [facility] can put in a new one for her.

## 2025-07-14 ENCOUNTER — DOCUMENTATION (OUTPATIENT)
Dept: SPEECH THERAPY | Facility: CLINIC | Age: 70
End: 2025-07-14
Payer: MEDICARE

## 2025-07-14 ENCOUNTER — APPOINTMENT (OUTPATIENT)
Dept: SPEECH THERAPY | Facility: CLINIC | Age: 70
End: 2025-07-14
Payer: MEDICARE

## 2025-07-14 NOTE — PROGRESS NOTES
Speech-Language Pathology                 Therapy Communication Note    Patient Name: Sweta Lim  MRN: [id number]  Department:  Outpatient Speech Therapy  Today's Date: 7/14/2025     Discipline: Speech Language Pathology    Missed Visit:   Pt canceled the assessment.  Pt to reach out if she would like to complete initial assessment with SLP.   Office phone [phone].

## 2025-07-15 ENCOUNTER — OFFICE VISIT (OUTPATIENT)
Facility: CLINIC | Age: 70
End: 2025-07-15
Payer: MEDICARE

## 2025-07-15 VITALS
WEIGHT: 171.9 LBS | SYSTOLIC BLOOD PRESSURE: 144 MMHG | HEART RATE: 70 BPM | DIASTOLIC BLOOD PRESSURE: 70 MMHG | TEMPERATURE: 98.1 F | BODY MASS INDEX: 26.98 KG/M2 | HEIGHT: 67 IN

## 2025-07-15 DIAGNOSIS — M81.8 OSTEOPOROSIS, DISUSE: ICD-10-CM

## 2025-07-15 DIAGNOSIS — M40.202 KYPHOSIS OF CERVICAL REGION, UNSPECIFIED KYPHOSIS TYPE: Primary | ICD-10-CM

## 2025-07-15 DIAGNOSIS — M54.2 CERVICALGIA: ICD-10-CM

## 2025-07-15 PROCEDURE — 99214 OFFICE O/P EST MOD 30 MIN: CPT | Mod: GC | Performed by: NEUROLOGICAL SURGERY

## 2025-07-15 PROCEDURE — 3077F SYST BP >= 140 MM HG: CPT | Performed by: NEUROLOGICAL SURGERY

## 2025-07-15 PROCEDURE — 1159F MED LIST DOCD IN RCRD: CPT | Performed by: NEUROLOGICAL SURGERY

## 2025-07-15 PROCEDURE — 1125F AMNT PAIN NOTED PAIN PRSNT: CPT | Performed by: NEUROLOGICAL SURGERY

## 2025-07-15 PROCEDURE — 3078F DIAST BP <80 MM HG: CPT | Performed by: NEUROLOGICAL SURGERY

## 2025-07-15 PROCEDURE — 3008F BODY MASS INDEX DOCD: CPT | Performed by: NEUROLOGICAL SURGERY

## 2025-07-15 PROCEDURE — 99214 OFFICE O/P EST MOD 30 MIN: CPT | Performed by: NEUROLOGICAL SURGERY

## 2025-07-15 RX ORDER — PEN NEEDLE, DIABETIC 32GX 5/32"
NEEDLE, DISPOSABLE MISCELLANEOUS
COMMUNITY
Start: 2025-05-07

## 2025-07-15 RX ORDER — SENNOSIDES 8.6 MG
1 TABLET ORAL DAILY
COMMUNITY
Start: 2025-07-08

## 2025-07-15 RX ORDER — GABAPENTIN 100 MG/1
1 CAPSULE ORAL
COMMUNITY
Start: 2025-07-10

## 2025-07-15 RX ORDER — HYDROCORTISONE 25 MG/G
CREAM TOPICAL
COMMUNITY
Start: 2025-06-16

## 2025-07-15 RX ORDER — CICLOPIROX 80 MG/ML
SOLUTION TOPICAL
COMMUNITY
Start: 2025-07-09

## 2025-07-15 RX ORDER — SIMETHICONE 125 MG
CAPSULE ORAL
COMMUNITY
Start: 2025-05-09

## 2025-07-15 ASSESSMENT — PAIN SCALES - GENERAL: PAINLEVEL_OUTOF10: 5
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
## 2025-07-17 ENCOUNTER — TELEPHONE (OUTPATIENT)
Dept: BEHAVIORAL HEALTH | Facility: CLINIC | Age: 70
End: 2025-07-17
Payer: MEDICARE

## 2025-07-17 ENCOUNTER — DOCUMENTATION (OUTPATIENT)
Dept: BEHAVIORAL HEALTH | Facility: CLINIC | Age: 70
End: 2025-07-17
Payer: MEDICARE

## 2025-07-17 DIAGNOSIS — F41.1 GAD (GENERALIZED ANXIETY DISORDER): ICD-10-CM

## 2025-07-17 RX ORDER — CLONAZEPAM 1 MG/1
TABLET ORAL
Qty: 30 TABLET | Refills: 0 | Status: SHIPPED | OUTPATIENT
Start: 2025-07-17

## 2025-07-17 NOTE — PROGRESS NOTES
Nonbillable note : patient left a message about need for clonazepam script . Reviewed oarrs, no concerns on oarrs , sent script to pharmacy for medication for one month , she is scheduled for an appointment this month . This medication is being tapered off with plan to discontinue it . [facility]

## 2025-07-22 ENCOUNTER — APPOINTMENT (OUTPATIENT)
Dept: BEHAVIORAL HEALTH | Facility: CLINIC | Age: 70
End: 2025-07-22
Payer: MEDICARE

## 2025-07-22 VITALS — SYSTOLIC BLOOD PRESSURE: 158 MMHG | HEART RATE: 74 BPM | DIASTOLIC BLOOD PRESSURE: 64 MMHG

## 2025-07-22 DIAGNOSIS — F51.01 PRIMARY INSOMNIA: ICD-10-CM

## 2025-07-22 DIAGNOSIS — F41.1 GAD (GENERALIZED ANXIETY DISORDER): ICD-10-CM

## 2025-07-22 PROCEDURE — 3078F DIAST BP <80 MM HG: CPT | Performed by: CLINICAL NURSE SPECIALIST

## 2025-07-22 PROCEDURE — 3077F SYST BP >= 140 MM HG: CPT | Performed by: CLINICAL NURSE SPECIALIST

## 2025-07-22 PROCEDURE — 99214 OFFICE O/P EST MOD 30 MIN: CPT | Performed by: CLINICAL NURSE SPECIALIST

## 2025-07-22 RX ORDER — TRAZODONE HYDROCHLORIDE 50 MG/1
TABLET ORAL
Qty: 180 TABLET | Refills: 0 | Status: SHIPPED | OUTPATIENT
Start: 2025-07-22

## 2025-07-22 RX ORDER — BUSPIRONE HYDROCHLORIDE 10 MG/1
TABLET ORAL
Qty: 450 TABLET | Refills: 1 | Status: SHIPPED | OUTPATIENT
Start: 2025-07-22

## 2025-07-22 RX ORDER — CLONAZEPAM 1 MG/1
TABLET ORAL
Qty: 30 TABLET | Refills: 2 | Status: SHIPPED | OUTPATIENT
Start: 2025-07-22

## 2025-07-22 RX ORDER — ESCITALOPRAM OXALATE 20 MG/1
20 TABLET ORAL DAILY
Qty: 90 TABLET | Refills: 0 | Status: SHIPPED | OUTPATIENT
Start: 2025-07-22
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
## 2025-07-24 ENCOUNTER — APPOINTMENT (OUTPATIENT)
Facility: CLINIC | Age: 70
End: 2025-07-24
Payer: MEDICARE

## 2025-07-25 DIAGNOSIS — J45.20 MILD INTERMITTENT ASTHMA WITHOUT COMPLICATION (HHS-HCC): ICD-10-CM

## 2025-07-25 RX ORDER — ALBUTEROL SULFATE 90 UG/1
2 INHALANT RESPIRATORY (INHALATION) EVERY 6 HOURS PRN
Qty: 18 G | Refills: 1 | Status: SHIPPED | OUTPATIENT
Start: 2025-07-25 | End: 2026-07-25

## 2025-08-01 ENCOUNTER — TELEPHONE (OUTPATIENT)
Dept: SPEECH THERAPY | Facility: CLINIC | Age: 70
End: 2025-08-01
Payer: MEDICARE

## 2025-08-08 ENCOUNTER — APPOINTMENT (OUTPATIENT)
Dept: GASTROENTEROLOGY | Facility: CLINIC | Age: 70
End: 2025-08-08
Payer: MEDICARE

## 2025-08-11 DIAGNOSIS — R13.10 DYSPHAGIA, UNSPECIFIED TYPE: Primary | ICD-10-CM

## 2025-08-14 ENCOUNTER — APPOINTMENT (OUTPATIENT)
Dept: PRIMARY CARE | Facility: CLINIC | Age: 70
End: 2025-08-14
Payer: MEDICARE

## 2025-08-21 ENCOUNTER — TELEPHONE (OUTPATIENT)
Dept: PRIMARY CARE | Facility: CLINIC | Age: 70
End: 2025-08-21
Payer: MEDICARE

## 2025-08-22 DIAGNOSIS — K58.1 IRRITABLE BOWEL SYNDROME WITH CONSTIPATION: Primary | ICD-10-CM

## 2025-08-22 DIAGNOSIS — G25.0 BENIGN HEAD TREMOR: ICD-10-CM

## 2025-08-22 RX ORDER — SENNOSIDES 8.6 MG
1 TABLET ORAL DAILY
Qty: 90 TABLET | Refills: 3 | Status: SHIPPED | OUTPATIENT
Start: 2025-08-22

## 2025-08-22 RX ORDER — PRIMIDONE 50 MG/1
TABLET ORAL
Qty: 392 TABLET | Refills: 4 | Status: SHIPPED | OUTPATIENT
Start: 2025-08-22

## 2025-08-26 ENCOUNTER — APPOINTMENT (OUTPATIENT)
Dept: RADIOLOGY | Facility: HOSPITAL | Age: 70
End: 2025-08-26
Payer: MEDICARE

## 2025-08-27 LAB
NON-UH HIE A/G RATIO: 0.8
NON-UH HIE ALB: 3.4 G/DL (ref 3.4–5)
NON-UH HIE ALK PHOS: 103 UNIT/L (ref 45–117)
NON-UH HIE BILIRUBIN, TOTAL: 0.2 MG/DL (ref 0.3–1.2)
NON-UH HIE BUN/CREAT RATIO: 24.3
NON-UH HIE BUN: 17 MG/DL (ref 9–23)
NON-UH HIE CALCIUM: 9.8 MG/DL (ref 8.7–10.4)
NON-UH HIE CALCULATED LDL CHOLESTEROL: 102 MG/DL (ref 60–130)
NON-UH HIE CALCULATED OSMOLALITY: 285 MOSM/KG (ref 275–295)
NON-UH HIE CHLORIDE: 106 MMOL/L (ref 98–107)
NON-UH HIE CHOLESTEROL: 189 MG/DL (ref 100–200)
NON-UH HIE CO2, VENOUS: 32 MMOL/L (ref 20–31)
NON-UH HIE CREATININE: 0.7 MG/DL (ref 0.5–0.8)
NON-UH HIE GFR AA: >60
NON-UH HIE GLOBULIN: 4.2 G/DL
NON-UH HIE GLOMERULAR FILTRATION RATE: >60 ML/MIN/1.73M?
NON-UH HIE GLUCOSE: 70 MG/DL (ref 74–106)
NON-UH HIE GOT: 20 UNIT/L (ref 15–37)
NON-UH HIE GPT: 18 UNIT/L (ref 10–49)
NON-UH HIE HDL CHOLESTEROL: 62 MG/DL (ref 40–60)
NON-UH HIE IRON: 31 UG/DL (ref 50–170)
NON-UH HIE K: 3.9 MMOL/L (ref 3.5–5.1)
NON-UH HIE NA: 143 MMOL/L (ref 135–145)
NON-UH HIE SATURATION: 7 % (ref 20–50)
NON-UH HIE TIBC: 440 UG/ML (ref 250–425)
NON-UH HIE TOTAL CHOL/HDL CHOL RATIO: 3
NON-UH HIE TOTAL PROTEIN: 7.6 G/DL (ref 5.7–8.2)
NON-UH HIE TRIGLYCERIDES: 126 MG/DL (ref 30–150)
NON-UH HIE VIT D 25: 23 NG/ML

## 2025-09-07 PROBLEM — R74.8 ELEVATED LIVER ENZYMES: Status: RESOLVED | Noted: 2024-08-28 | Resolved: 2025-09-07

## 2025-09-07 PROBLEM — E55.9 MILD VITAMIN D DEFICIENCY: Status: RESOLVED | Noted: 2023-03-06 | Resolved: 2025-09-07

## 2025-09-07 ASSESSMENT — ENCOUNTER SYMPTOMS
SHORTNESS OF BREATH: 0
EYE REDNESS: 0
PALPITATIONS: 0
RHINORRHEA: 0
WHEEZING: 0
CHEST TIGHTNESS: 0
COUGH: 0
SINUS PRESSURE: 0
VOMITING: 0
EYE DISCHARGE: 0
ARTHRALGIAS: 0
HEADACHES: 0
FEVER: 0
ABDOMINAL PAIN: 0
DIARRHEA: 0
FATIGUE: 0
MYALGIAS: 0
CHILLS: 0
SORE THROAT: 0
FACIAL SWELLING: 0

## 2025-09-08 ENCOUNTER — APPOINTMENT (OUTPATIENT)
Dept: PRIMARY CARE | Facility: CLINIC | Age: 70
End: 2025-09-08
Payer: MEDICARE

## 2025-09-08 DIAGNOSIS — I10 BENIGN ESSENTIAL HYPERTENSION: ICD-10-CM

## 2025-09-08 DIAGNOSIS — E11.9 TYPE 2 DIABETES MELLITUS WITHOUT COMPLICATION, WITH LONG-TERM CURRENT USE OF INSULIN: ICD-10-CM

## 2025-09-08 DIAGNOSIS — F17.210 CIGARETTE SMOKER: ICD-10-CM

## 2025-09-08 DIAGNOSIS — Z79.4 TYPE 2 DIABETES MELLITUS WITHOUT COMPLICATION, WITH LONG-TERM CURRENT USE OF INSULIN: ICD-10-CM

## 2025-09-08 DIAGNOSIS — E78.2 MIXED HYPERLIPIDEMIA: Primary | ICD-10-CM

## 2025-10-08 ENCOUNTER — APPOINTMENT (OUTPATIENT)
Dept: PRIMARY CARE | Facility: CLINIC | Age: 70
End: 2025-10-08
Payer: MEDICARE

## 2025-10-14 ENCOUNTER — APPOINTMENT (OUTPATIENT)
Dept: BEHAVIORAL HEALTH | Facility: CLINIC | Age: 70
End: 2025-10-14
Payer: MEDICARE

## 2025-10-22 ENCOUNTER — APPOINTMENT (OUTPATIENT)
Dept: INTEGRATIVE MEDICINE | Facility: CLINIC | Age: 70
End: 2025-10-22
Payer: MEDICARE

## 2025-11-10 ENCOUNTER — APPOINTMENT (OUTPATIENT)
Dept: GASTROENTEROLOGY | Facility: CLINIC | Age: 70
End: 2025-11-10
Payer: MEDICARE

## 2025-12-04 ENCOUNTER — APPOINTMENT (OUTPATIENT)
Dept: PRIMARY CARE | Facility: CLINIC | Age: 70
End: 2025-12-04
Payer: MEDICARE

## (undated) DEVICE — SPONGE, HEMOSTATIC, GELATIN, SURGIFOAM, 8 X 12.5 CM X 10 MM

## (undated) DEVICE — APPLICATOR, CHLORAPREP, W/ORANGE TINT, 26ML

## (undated) DEVICE — Device

## (undated) DEVICE — SPONGE, HEMOSTAT, SURGICEL FIBRILLAR, ABS, 4 X 4, LF

## (undated) DEVICE — SEALANT, HEMOSTATIC, FLOSEAL, 10 ML

## (undated) DEVICE — DRESSING, MEPILEX, BORDER, HEEL, 8.7 X 9.1 IN

## (undated) DEVICE — TUBING, SMOKE EVAC, 3/8 X 10 FT

## (undated) DEVICE — HEMOSTAT, SURGICEL POWDER 3.0GRAMS

## (undated) DEVICE — PIN, SKULL, MAYFIELD, ADULT

## (undated) DEVICE — SPONGE, DISSECTOR, PEANUT, 3/8, STERILE 5 FOAM HOLDER"

## (undated) DEVICE — TUBING, SMOKE EVACUATOR, LF, 7/8 X 10

## (undated) DEVICE — SPONGE, HEMOSTATIC, CELLULOSE, SURGICEL, 2 X 14 IN

## (undated) DEVICE — SUTURE, VICRYL, 3-0, 18 IN, UNDYED

## (undated) DEVICE — SUTURE, POLYSORB, 2-0, 27 IN, GS21, UNDYED

## (undated) DEVICE — DRAPE PACK, MAJOR, OPTIMA, PEDIATRIC, 77 X 108 IN, DISPOSABLE, LF, STERILE

## (undated) DEVICE — APPLICATOR, PREP, CHLORAPREP, W/ORANGE TINT, 10.5ML

## (undated) DEVICE — DRAPE, SHEET, LAPAROTOMY, W/ISO-BAC, W/ARMBOARD COVERS, 98 X 122 IN, DISPOSABLE, LF, STERILE

## (undated) DEVICE — DRAPE, INSTRUMENT, W/POUCH, STERI DRAPE, 7 X 11 IN, DISPOSABLE, STERILE

## (undated) DEVICE — PAD, GROUNDING, ELECTROSURGICAL, W/9 FT CABLE, POLYHESIVE II, ADULT, LF

## (undated) DEVICE — SUTURE, VICRYL, 3-0,18 IN, SH, UNDYED

## (undated) DEVICE — TOOL, MR8 14CM MATCH 3MM

## (undated) DEVICE — DRAPE, C-ARM IMAGE

## (undated) DEVICE — GOWN, SURGICAL, SMARTGOWN, XLARGE, STERILE

## (undated) DEVICE — DRESSING, MEPILEX, BORDER, SACRUM, 8.7 X 9.8 IN

## (undated) DEVICE — EVACUATOR, WOUND, SUCTION, CLOSED, JACKSON-PRATT, 100 CC, SILICONE

## (undated) DEVICE — DRESSING, MEPILEX, BORDER LIGHT, 3 X 3 IN

## (undated) DEVICE — TUBING, SUCTION, CONNECTING, STERILE 0.25 X 120 IN., LF

## (undated) DEVICE — DRAIN, WOUND, FLAT, JACKSON-PRATT, FULL PERFORATION, 7 MM, SILICONE

## (undated) DEVICE — COVER, TABLE, UHC

## (undated) DEVICE — COVER, CART, 45 X 27 X 48 IN, CLEAR

## (undated) DEVICE — SUTURE, VICRYL, 4-0, 18 IN, UNDYED BR PS-2

## (undated) DEVICE — DRESSING, MEPILEX, POST OP, 8 X 4

## (undated) DEVICE — ADHESIVE, SKIN, LIQUIBAND EXCEED

## (undated) DEVICE — REST, HEAD, BAGEL, 9 IN

## (undated) DEVICE — WOUND SYSTEM, DEBRIDEMENT & CLEANING, O.R DUOPAK

## (undated) DEVICE — MARKER, SKIN, RULER AND LABEL PACK, CUSTOM

## (undated) DEVICE — DRAPE, MICROSCOPE, FOR ZEISS 65MM, VARI-LENS II, 52 X 150

## (undated) DEVICE — BUR, 3MM X 3.8MM, PRECISION, NEURO DRILL

## (undated) DEVICE — REST, HEAD, BAGEL, 7 IN

## (undated) DEVICE — SUTURE, MONOCRYL, 4-0, 18 IN, PS2, UNDYED

## (undated) DEVICE — ELECTRODE, ELECTROSURGICAL, BLADE EXT 4 INCH, INSULATED

## (undated) DEVICE — DRAPE, PEDIATRIC

## (undated) DEVICE — CATHETER TRAY, SURESTEP, 16FR, URINE METER W/STATLOCK

## (undated) DEVICE — GOWN, SURGICAL, SMARTGOWN, XX-LARGE, STERILE

## (undated) DEVICE — APPLICATOR, PREP, ONE-STEP, ANTIMICROBIAL, CHLORAPREP, TINTED, 10.5ML

## (undated) DEVICE — MANIFOLD, 4 PORT NEPTUNE STANDARD